# Patient Record
Sex: FEMALE | Race: WHITE | NOT HISPANIC OR LATINO | Employment: OTHER | URBAN - METROPOLITAN AREA
[De-identification: names, ages, dates, MRNs, and addresses within clinical notes are randomized per-mention and may not be internally consistent; named-entity substitution may affect disease eponyms.]

---

## 2017-05-21 PROBLEM — F98.8 ADD (ATTENTION DEFICIT DISORDER): Status: ACTIVE | Noted: 2017-05-21

## 2017-05-21 PROBLEM — F32.A ANXIETY AND DEPRESSION: Status: ACTIVE | Noted: 2017-05-21

## 2017-05-21 PROBLEM — F41.9 ANXIETY AND DEPRESSION: Status: ACTIVE | Noted: 2017-05-21

## 2017-05-21 PROBLEM — J45.30 MILD PERSISTENT ASTHMA WITHOUT COMPLICATION: Status: ACTIVE | Noted: 2017-05-21

## 2017-05-22 PROBLEM — N20.0 NEPHROLITHIASIS: Status: ACTIVE | Noted: 2017-05-22

## 2017-05-22 PROBLEM — G89.21 CHRONIC PAIN DUE TO TRAUMA: Status: ACTIVE | Noted: 2017-05-22

## 2017-05-22 PROBLEM — Z87.442 HISTORY OF NEPHROLITHIASIS: Status: ACTIVE | Noted: 2017-05-22

## 2017-05-22 PROBLEM — N31.9 NEUROGENIC BLADDER: Status: ACTIVE | Noted: 2017-05-22

## 2017-06-19 ENCOUNTER — PATIENT OUTREACH (OUTPATIENT)
Dept: ADMINISTRATIVE | Facility: HOSPITAL | Age: 57
End: 2017-06-19

## 2017-06-19 NOTE — LETTER
June 19, 2017    Vesta ROSALINDA Garrett  [address]  [place] [address]             [facility]  [address]  [facility] [address]  Phone: [phone] Dear Mrs. Tucker:    [facility] is committed to your overall health.  To help you get the most out of each of your visits, we will review your information to make sure you are up to date on all of your recommended tests and/or procedures.      As a new patient to Dr. Shaina Rasmussen, we may not have your complete medical records.  She has found that you may be due for your fasting cholesterol labs, a pap smear, hepatitis C screening, and possibly tetanus and pneumonia immunizations.     Medicare does not cover all immunizations to be given in the clinic.  Check your benefits to ensure that you do not need to receive your immunizations at the pharmacy.    If you have had any of the above done at another facility, please bring the records or information with you so that your record at [facility] will be complete.  If you would like to schedule any of these, please contact me.    If you are currently taking medication, please bring it with you to your appointment for review.    If you have any questions or concerns, please don't hesitate to call.    Thank you for letting us care for you,  Taryn Hutchinson LPN Clinical Care Coordinator  [facility] [place]  [phone]

## 2017-07-03 ENCOUNTER — OFFICE VISIT (OUTPATIENT)
Dept: FAMILY MEDICINE | Facility: CLINIC | Age: 57
End: 2017-07-03
Payer: MEDICARE

## 2017-07-03 ENCOUNTER — TELEPHONE (OUTPATIENT)
Dept: ADMINISTRATIVE | Facility: HOSPITAL | Age: 57
End: 2017-07-03

## 2017-07-03 VITALS
TEMPERATURE: 98 F | BODY MASS INDEX: 23.77 KG/M2 | WEIGHT: 147.94 LBS | HEIGHT: 66 IN | DIASTOLIC BLOOD PRESSURE: 64 MMHG | HEART RATE: 76 BPM | SYSTOLIC BLOOD PRESSURE: 102 MMHG

## 2017-07-03 DIAGNOSIS — B35.1 ONYCHOMYCOSIS: ICD-10-CM

## 2017-07-03 DIAGNOSIS — R23.8 SKIN BREAKDOWN: ICD-10-CM

## 2017-07-03 DIAGNOSIS — M21.371 FOOT DROP, BILATERAL: ICD-10-CM

## 2017-07-03 DIAGNOSIS — K51.919 ULCERATIVE COLITIS WITH COMPLICATION, UNSPECIFIED LOCATION: ICD-10-CM

## 2017-07-03 DIAGNOSIS — M21.372 FOOT DROP, BILATERAL: ICD-10-CM

## 2017-07-03 DIAGNOSIS — Z76.89 ESTABLISHING CARE WITH NEW DOCTOR, ENCOUNTER FOR: ICD-10-CM

## 2017-07-03 DIAGNOSIS — F98.8 ADD (ATTENTION DEFICIT DISORDER): ICD-10-CM

## 2017-07-03 DIAGNOSIS — Z11.59 ENCOUNTER FOR HEPATITIS C SCREENING TEST FOR LOW RISK PATIENT: Primary | ICD-10-CM

## 2017-07-03 PROCEDURE — 99999 PR PBB SHADOW E&M-EST. PATIENT-LVL V: CPT | Mod: PBBFAC,,, | Performed by: FAMILY MEDICINE

## 2017-07-03 PROCEDURE — 99204 OFFICE O/P NEW MOD 45 MIN: CPT | Mod: S$GLB,,, | Performed by: FAMILY MEDICINE

## 2017-07-03 RX ORDER — MELATONIN 5 MG
1 CAPSULE ORAL NIGHTLY PRN
COMMUNITY

## 2017-07-03 RX ORDER — ACETAMINOPHEN 325 MG/1
325 TABLET ORAL EVERY 6 HOURS PRN
COMMUNITY
End: 2022-09-27

## 2017-07-03 RX ORDER — DEXTROAMPHETAMINE SACCHARATE, AMPHETAMINE ASPARTATE MONOHYDRATE, DEXTROAMPHETAMINE SULFATE AND AMPHETAMINE SULFATE 5; 5; 5; 5 MG/1; MG/1; MG/1; MG/1
20 CAPSULE, EXTENDED RELEASE ORAL EVERY MORNING
Qty: 30 CAPSULE | Refills: 0 | Status: SHIPPED | OUTPATIENT
Start: 2017-07-03 | End: 2018-02-08 | Stop reason: SDUPTHER

## 2017-07-03 RX ORDER — CICLOPIROX 80 MG/ML
SOLUTION TOPICAL NIGHTLY
Qty: 6.6 ML | Refills: 11 | Status: SHIPPED | OUTPATIENT
Start: 2017-07-03 | End: 2023-06-21

## 2017-07-03 RX ORDER — DEXTROAMPHETAMINE SACCHARATE, AMPHETAMINE ASPARTATE MONOHYDRATE, DEXTROAMPHETAMINE SULFATE AND AMPHETAMINE SULFATE 5; 5; 5; 5 MG/1; MG/1; MG/1; MG/1
20 CAPSULE, EXTENDED RELEASE ORAL EVERY MORNING
Qty: 30 CAPSULE | Refills: 0 | Status: SHIPPED | OUTPATIENT
Start: 2017-08-03 | End: 2017-12-20 | Stop reason: SDUPTHER

## 2017-07-03 RX ORDER — MUPIROCIN 20 MG/G
OINTMENT TOPICAL 2 TIMES DAILY
Qty: 30 G | Refills: 1 | Status: SHIPPED | OUTPATIENT
Start: 2017-07-03 | End: 2017-12-20 | Stop reason: ALTCHOICE

## 2017-07-03 RX ORDER — DEXTROAMPHETAMINE SACCHARATE, AMPHETAMINE ASPARTATE MONOHYDRATE, DEXTROAMPHETAMINE SULFATE AND AMPHETAMINE SULFATE 5; 5; 5; 5 MG/1; MG/1; MG/1; MG/1
20 CAPSULE, EXTENDED RELEASE ORAL EVERY MORNING
Qty: 30 CAPSULE | Refills: 0 | Status: SHIPPED | OUTPATIENT
Start: 2017-09-03 | End: 2018-02-08 | Stop reason: SDUPTHER

## 2017-07-03 NOTE — LETTER
July 6, 2017    Dr. Yasir Hester             [facility]  [address]  [facility] [address]  Phone: [phone] July 6, 2017     Patient: Vesta Tucker    YOB: 1960   Date of Visit: 7/3/2017       To Whom It May Concern:    We are seeing Vesta Tucker in the clinic at [facility].  No primary care provider on file. is their PCP.  She/He has an outstanding lab/procedure at the time we reviewed their chart.  To help with our Health Maintenance records will you please supply the following:     [x]  Outside Immunizations (Tdap and Pneumo 23 - provide dates or update in LINKS)                         Please Fax to [facility] at .    Thank you for your help.  If my Care Coordinator can be of any assistance, you can call THONY Dumas at [phone].     This is the second attempt to acquire these records.    If you have any questions or concerns, please don't hesitate to call.    Sincerely,        Shaina Rasmussen MD

## 2017-07-03 NOTE — LETTER
July 11, 2017    Vesta Tucker  [address] [address]             [facility]  [address]  [facility] [address]  Phone: [phone] Dear Mrs. Tucker:    Dr. Rasmussen had me request your immunization records from Dr. Hester, but I have yet to receive anything from them.  Sometimes these facilities require a signed release before they will share records.  Next time you are in the office, we will have you sign a release of information and try again.    If you have any questions or concerns, please don't hesitate to call.    Thank you for letting us care for you,  Taryn Hutchinson LPN Clinical Care Coordinator  [facility] [place]  [phone]

## 2017-07-03 NOTE — TELEPHONE ENCOUNTER
----- Message from Shaina Rasmussen MD sent at 7/3/2017  9:47 AM CDT -----  Tdap and [id number] with Dk (while he was with [facility] or prior).   attempted to acquire dates.  Nothing in LINKS    Yasir Hester MD PCP - General Internal Medicine 12/28/2011 05/16/2017 5/17/17   Phone: [phone]; Fax: [phone]      Address: [address] [place] [address] [facility]  [facility] [address]

## 2017-07-03 NOTE — LETTER
July 3, 2017    Dr. Yasir Hester             [facility]  [address]  [facility] [address]  Phone: [phone] July 3, 2017     Patient: Vesta Tucker    YOB: 1960   Date of Visit: 7/3/2017       To Whom It May Concern:    We are seeing Vesta Tucker in the clinic at [facility].  No primary care provider on file. is their PCP.  She/He has an outstanding lab/procedure at the time we reviewed their chart.  To help with our Health Maintenance records will you please supply the following:     [x]  Outside Immunizations (Tdap and Pneumo 23 - provide dates or update in LINKS)                         Please Fax to [facility] at .    Thank you for your help.  If my Care Coordinator can be of any assistance, you can call THONY Dumas at [phone].     If you have any questions or concerns, please don't hesitate to call.    Sincerely,        Shaina Rasmussen MD
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
## 2017-09-18 ENCOUNTER — PATIENT OUTREACH (OUTPATIENT)
Dept: ADMINISTRATIVE | Facility: HOSPITAL | Age: 57
End: 2017-09-18

## 2017-09-18 NOTE — LETTER
September 18, 2017    Vesta TELLEZ Garrett  [address]  [place] [address]             [facility]  [address]  [facility] [address]  Phone: [phone] Dear Mrs. Tucker:    [facility] committed to your overall health.  To help you get the most out of each of your visits, we will review your information to make sure you are up to date on all of your recommended tests and/or procedures.      [facility] has found that you may be due for your fasting cholesterol labs, hepatitis C screening, and possibly some immunizations (tetanus, pneumonia, and flu).     Medicare does not cover all immunizations to be given in the clinic.  Check your benefits to ensure that you do not need to receive your immunizations at the pharmacy.    If you have had any of the above done at another facility, please bring the records or information with you so that your record at [facility] will be complete.  If you would like to schedule any of these, please contact me.    If you are currently taking medication, please bring it with you to your appointment for review.    If you have any questions or concerns, please don't hesitate to call.    Thank you for letting us care for you,  Taryn Hutchinson LPN Clinical Care Coordinator  [facility] [place]  [phone]

## 2017-12-20 ENCOUNTER — OFFICE VISIT (OUTPATIENT)
Dept: FAMILY MEDICINE | Facility: CLINIC | Age: 57
End: 2017-12-20
Payer: MEDICARE

## 2017-12-20 VITALS
BODY MASS INDEX: 24.36 KG/M2 | HEIGHT: 66 IN | DIASTOLIC BLOOD PRESSURE: 60 MMHG | WEIGHT: 151.56 LBS | SYSTOLIC BLOOD PRESSURE: 100 MMHG | TEMPERATURE: 99 F | HEART RATE: 98 BPM | OXYGEN SATURATION: 95 %

## 2017-12-20 DIAGNOSIS — J20.8 ACUTE BRONCHITIS, VIRAL: ICD-10-CM

## 2017-12-20 DIAGNOSIS — J45.31 MILD PERSISTENT ASTHMA WITH ACUTE EXACERBATION: ICD-10-CM

## 2017-12-20 DIAGNOSIS — J32.3 SPHENOID SINUSITIS, UNSPECIFIED CHRONICITY: ICD-10-CM

## 2017-12-20 DIAGNOSIS — J11.1 INFLUENZAL ACUTE UPPER RESPIRATORY INFECTION: Primary | ICD-10-CM

## 2017-12-20 DIAGNOSIS — N31.9 NEUROGENIC BLADDER: ICD-10-CM

## 2017-12-20 DIAGNOSIS — R68.89 INFLUENZA-LIKE SYMPTOMS: ICD-10-CM

## 2017-12-20 LAB
CTP QC/QA: YES
FLUAV AG NPH QL: NEGATIVE
FLUBV AG NPH QL: NEGATIVE

## 2017-12-20 PROCEDURE — 99999 PR PBB SHADOW E&M-EST. PATIENT-LVL IV: CPT | Mod: PBBFAC,,, | Performed by: INTERNAL MEDICINE

## 2017-12-20 PROCEDURE — 99214 OFFICE O/P EST MOD 30 MIN: CPT | Mod: 25,S$GLB,, | Performed by: INTERNAL MEDICINE

## 2017-12-20 PROCEDURE — 87804 INFLUENZA ASSAY W/OPTIC: CPT | Mod: QW,S$GLB,, | Performed by: INTERNAL MEDICINE

## 2017-12-20 RX ORDER — BUDESONIDE AND FORMOTEROL FUMARATE DIHYDRATE 80; 4.5 UG/1; UG/1
2 AEROSOL RESPIRATORY (INHALATION) 2 TIMES DAILY
Qty: 1 INHALER | Refills: 1 | Status: SHIPPED | OUTPATIENT
Start: 2017-12-20 | End: 2019-01-28 | Stop reason: SDUPTHER

## 2017-12-20 RX ORDER — DOXYCYCLINE HYCLATE 100 MG
100 TABLET ORAL EVERY 12 HOURS
Qty: 20 TABLET | Refills: 0 | Status: SHIPPED | OUTPATIENT
Start: 2017-12-20 | End: 2017-12-30

## 2017-12-20 RX ORDER — OSELTAMIVIR PHOSPHATE 75 MG/1
75 CAPSULE ORAL 2 TIMES DAILY
Qty: 10 CAPSULE | Refills: 0 | Status: SHIPPED | OUTPATIENT
Start: 2017-12-20 | End: 2017-12-25

## 2017-12-20 RX ORDER — METHYLPREDNISOLONE 4 MG/1
TABLET ORAL
Qty: 1 PACKAGE | Refills: 0 | Status: SHIPPED | OUTPATIENT
Start: 2017-12-20 | End: 2018-01-10

## 2017-12-20 RX ORDER — BENZONATATE 100 MG/1
100 CAPSULE ORAL 3 TIMES DAILY PRN
Qty: 30 CAPSULE | Refills: 0 | Status: SHIPPED | OUTPATIENT
Start: 2017-12-20 | End: 2017-12-30

## 2017-12-20 NOTE — PATIENT INSTRUCTIONS
Influenzal acute upper respiratory infection; tamiflu 75 mg 2x a day for 5 days. Mucinex DM otc for cough and congestion. Tussionex for coughing spasms.  -     POCT Influenza A/B    Influenza-like symptoms; rest and fluids; aleve for body aches and pains.  -     POCT Influenza A/B    Mild persistent asthma with acute exacerbation; use her albuterol Nebs/MDI q 4 hrs prn wheeze/ SOB. 6 day medrol dose pack.    Acute bronchitis, viral; mucinex DM as needed.tessalon perles as needed.    Sphenoid sinusitis, unspecified chronicity; use zyrtec 10 mg a day for congestion; flonase 1 spray 1-2x a day as needed.    Neurogenic bladder; self catherization as needed.    Other orders  -     methylPREDNISolone (MEDROL DOSEPACK) 4 mg tablet; use as directed; 6 day pack.  Dispense: 1 Package; Refill: 0  -     oseltamivir (TAMIFLU) 75 MG capsule; Take 1 capsule (75 mg total) by mouth 2 (two) times daily. Take pc.  Dispense: 10 capsule; Refill: 0

## 2017-12-20 NOTE — PROGRESS NOTES
Subjective:       Patient ID: Vesta Tucker is a 57 y.o. female.    Chief Complaint: Cough (x 1 day with clear mucus ); Generalized Body Aches (pt says that all three kids have the flu); Fever; Headache; and Sore Throat    HPI  Pt seen today for Dr Elder; Dry cough for 1 day w clear mucus; myalgias diffuse w HA's acute onset since this morning; 4 family members on tamiflu; 5th on augmentin for sinusitis.   Pt w temp 99.5 today; took 500 mg tylenol prior. Also w some nausea. A lot of fatigue. Sore throat also. Sinus congestion w nasal drip also. Taking robitussin CF, sudafed and tylenol. Hx of asthma; no audible wheezing. Using albuterol as MDI/neb today 3x; due to coughing spasms. PEF was 340 cc. Nasal swab for influ A/B was neg. Plan of care discussed at length.  Review of Systems   Constitutional: Positive for fatigue. Negative for appetite change, chills, fever and unexpected weight change.   HENT: Negative for congestion, postnasal drip, rhinorrhea and sinus pressure.         See HEENT    Eyes: Negative for discharge and itching.   Respiratory: Positive for cough and shortness of breath. Negative for chest tightness and wheezing.         As VEELZ.   Cardiovascular: Negative for chest pain, palpitations and leg swelling.   Gastrointestinal: Negative for abdominal distention, abdominal pain, blood in stool, constipation, diarrhea, nausea and vomiting.   Endocrine: Negative for polydipsia, polyphagia and polyuria.   Genitourinary: Negative for dysuria and hematuria.        Self catherization due to spinal cord injury.    Musculoskeletal: Negative for arthralgias and myalgias.   Skin: Negative for rash.   Allergic/Immunologic: Negative for environmental allergies and food allergies.   Neurological: Negative for tremors, seizures and syncope.   Hematological: Negative for adenopathy. Does not bruise/bleed easily.   Psychiatric/Behavioral:        No anxiety or depression.       Objective:      Vitals:    12/20/17 1520  "  BP: 100/60   BP Location: Left arm   Patient Position: Sitting   BP Method: Medium (Manual)   Pulse: 98   Temp: 98.8 °F (37.1 °C)   TempSrc: Oral   SpO2: 95%   Weight: 68.8 kg (151 lb 9.1 oz)   Height: 5' 6" (1.676 m)     Body mass index is 24.46 kg/m².    Physical Exam   Constitutional: She is oriented to person, place, and time. She appears well-developed and well-nourished.   HENT:   Head: Normocephalic and atraumatic.   TM's pink and throat; post pharynx mildly inflamed. NM swollen; inflamed w clear to yel-gold mucus. Sphenoidal sinus tenderness.   Eyes: EOM are normal.   Neck: Normal range of motion. Neck supple. No thyromegaly present.   Cardiovascular: Normal rate, regular rhythm and normal heart sounds.  Exam reveals no gallop.    No murmur heard.  Pulmonary/Chest: Effort normal. No respiratory distress. She has no wheezes. She has no rales.   Decreased mae BS's w scattered wheeze. Unable to forcibly  without coughing.    Abdominal: Soft. Bowel sounds are normal. She exhibits no distension. There is no tenderness. There is no rebound and no guarding.   Musculoskeletal: Normal range of motion. She exhibits no edema.   Lymphadenopathy:     She has no cervical adenopathy.   Neurological: She is alert and oriented to person, place, and time.   Moves all 4 extremities fine.   Skin: No rash noted.   Psychiatric: She has a normal mood and affect. Her behavior is normal. Thought content normal.   Vitals reviewed.      Assessment:       1. Influenzal acute upper respiratory infection    2. Influenza-like symptoms    3. Mild persistent asthma with acute exacerbation    4. Acute bronchitis, viral    5. Sphenoid sinusitis, unspecified chronicity    6. Neurogenic bladder        Plan:       Influenzal acute upper respiratory infection; tamiflu 75 mg 2x a day for 5 days. Mucinex DM otc for cough and congestion. Tussionex for coughing spasms.  -     POCT Influenza A/B    Influenza-like symptoms; rest and fluids; " aleve for body aches and pains.  -     POCT Influenza A/B    Mild persistent asthma with acute exacerbation; use her albuterol Nebs/MDI q 4 hrs prn wheeze/ SOB. 6 day medrol dose pack. Follow PEF; given flow meter.     Acute bronchitis, viral; mucinex DM as needed.tessalon perles as needed.    Sphenoid sinusitis, unspecified chronicity; use zyrtec 10 mg a day for congestion; flonase 1 spray 1-2x a day as needed.    Neurogenic bladder; self catherization as needed.    Other orders  -     methylPREDNISolone (MEDROL DOSEPACK) 4 mg tablet; use as directed; 6 day pack.  Dispense: 1 Package; Refill: 0  -     oseltamivir (TAMIFLU) 75 MG capsule; Take 1 capsule (75 mg total) by mouth 2 (two) times daily. Take pc.  Dispense: 10 capsule; Refill: 0

## 2017-12-21 ENCOUNTER — HOSPITAL ENCOUNTER (OUTPATIENT)
Dept: RADIOLOGY | Facility: HOSPITAL | Age: 57
Discharge: HOME | End: 2017-12-21
Attending: INTERNAL MEDICINE
Payer: MEDICARE

## 2017-12-21 DIAGNOSIS — J20.8 ACUTE BRONCHITIS, VIRAL: ICD-10-CM

## 2017-12-21 DIAGNOSIS — J45.31 MILD PERSISTENT ASTHMA WITH ACUTE EXACERBATION: ICD-10-CM

## 2017-12-21 DIAGNOSIS — J11.1 INFLUENZAL ACUTE UPPER RESPIRATORY INFECTION: ICD-10-CM

## 2017-12-21 PROCEDURE — 71020 XR CHEST PA AND LATERAL: CPT | Mod: TC,PN

## 2017-12-21 PROCEDURE — 71020 XR CHEST PA AND LATERAL: CPT | Mod: 26,,, | Performed by: RADIOLOGY

## 2018-01-08 RX ORDER — DEXTROAMPHETAMINE SACCHARATE, AMPHETAMINE ASPARTATE MONOHYDRATE, DEXTROAMPHETAMINE SULFATE AND AMPHETAMINE SULFATE 5; 5; 5; 5 MG/1; MG/1; MG/1; MG/1
CAPSULE, EXTENDED RELEASE ORAL
Qty: 30 CAPSULE | Refills: 0 | Status: SHIPPED | OUTPATIENT
Start: 2018-01-08 | End: 2018-02-08 | Stop reason: SDUPTHER

## 2018-02-08 ENCOUNTER — OFFICE VISIT (OUTPATIENT)
Dept: FAMILY MEDICINE | Facility: CLINIC | Age: 58
End: 2018-02-08
Payer: MEDICARE

## 2018-02-08 VITALS
OXYGEN SATURATION: 98 % | BODY MASS INDEX: 24.36 KG/M2 | DIASTOLIC BLOOD PRESSURE: 62 MMHG | SYSTOLIC BLOOD PRESSURE: 108 MMHG | HEIGHT: 66 IN | WEIGHT: 151.56 LBS | HEART RATE: 84 BPM

## 2018-02-08 DIAGNOSIS — N20.0 NEPHROLITHIASIS: ICD-10-CM

## 2018-02-08 DIAGNOSIS — M21.372 FOOT DROP, BILATERAL: ICD-10-CM

## 2018-02-08 DIAGNOSIS — M21.371 FOOT DROP, BILATERAL: ICD-10-CM

## 2018-02-08 DIAGNOSIS — R07.9 CHEST PAIN, UNSPECIFIED TYPE: Primary | ICD-10-CM

## 2018-02-08 DIAGNOSIS — G89.21 CHRONIC PAIN DUE TO TRAUMA: ICD-10-CM

## 2018-02-08 PROCEDURE — 93005 ELECTROCARDIOGRAM TRACING: CPT | Mod: S$GLB,,, | Performed by: FAMILY MEDICINE

## 2018-02-08 PROCEDURE — 99213 OFFICE O/P EST LOW 20 MIN: CPT | Mod: PN | Performed by: FAMILY MEDICINE

## 2018-02-08 PROCEDURE — 93010 ELECTROCARDIOGRAM REPORT: CPT | Mod: S$GLB,,, | Performed by: INTERNAL MEDICINE

## 2018-02-08 PROCEDURE — 99214 OFFICE O/P EST MOD 30 MIN: CPT | Mod: S$GLB,,, | Performed by: FAMILY MEDICINE

## 2018-02-08 PROCEDURE — 99999 PR PBB SHADOW E&M-EST. PATIENT-LVL III: CPT | Mod: PBBFAC,,, | Performed by: FAMILY MEDICINE

## 2018-02-08 PROCEDURE — 3008F BODY MASS INDEX DOCD: CPT | Mod: S$GLB,,, | Performed by: FAMILY MEDICINE

## 2018-02-08 RX ORDER — DEXTROAMPHETAMINE SACCHARATE, AMPHETAMINE ASPARTATE MONOHYDRATE, DEXTROAMPHETAMINE SULFATE AND AMPHETAMINE SULFATE 5; 5; 5; 5 MG/1; MG/1; MG/1; MG/1
20 CAPSULE, EXTENDED RELEASE ORAL EVERY MORNING
Qty: 30 CAPSULE | Refills: 0 | Status: SHIPPED | OUTPATIENT
Start: 2018-02-08 | End: 2018-05-17 | Stop reason: SDUPTHER

## 2018-02-08 RX ORDER — CYCLOBENZAPRINE HCL 10 MG
10 TABLET ORAL 3 TIMES DAILY PRN
Qty: 90 TABLET | Refills: 0 | Status: SHIPPED | OUTPATIENT
Start: 2018-02-08 | End: 2018-05-04 | Stop reason: SDUPTHER

## 2018-02-08 RX ORDER — HYDROCHLOROTHIAZIDE 12.5 MG/1
12.5 TABLET ORAL DAILY
Qty: 90 TABLET | Refills: 1
Start: 2018-02-08 | End: 2018-02-09 | Stop reason: SDUPTHER

## 2018-02-08 NOTE — PROGRESS NOTES
Subjective:       Patient ID: Vesta Tucker is a 57 y.o. female.    Chief Complaint: Follow-up and Chest Pain (Lside CP since yest, says asthma related)    HPI   PMH sig for UC, neurogenic bladder, chronic pain due to trauma, asthma, anxiety/depression/add, recurrent kidney stones.    Patient in the office with L chest wall pain, onset this morning. Upper and lower. She recalls that it has occurred prev in Dec. Does not limit physical activity/exercise incl an hour at the gym in full cardio, .   Currently using albuterol bid. Not using symbicort. Sx improved temporarily while on neb, but then recurred.  Pain is not severe, just nagging. Not reproducible on palp.     Review of Systems   Constitutional: Negative for appetite change, chills, fatigue, fever and unexpected weight change.   HENT: Negative for congestion, postnasal drip, rhinorrhea, sinus pain, sinus pressure, sneezing and sore throat.         Prev flu sx resolved.   Eyes: Negative for discharge and itching.   Respiratory: Negative for cough, chest tightness, shortness of breath and wheezing.    Cardiovascular: Positive for chest pain. Negative for palpitations and leg swelling.   Gastrointestinal: Negative for abdominal pain, constipation and diarrhea.   Genitourinary:        Self catherization due to spinal cord injury.    Musculoskeletal: Negative for arthralgias and myalgias.   Skin: Negative for rash.   Neurological: Negative for tremors and syncope.   Hematological: Negative for adenopathy. Does not bruise/bleed easily.       Objective:      Physical Exam   Constitutional: She is oriented to person, place, and time. She appears well-developed and well-nourished. No distress.   HENT:   Head: Normocephalic and atraumatic.   Nose: Nose normal.   Mouth/Throat: Oropharynx is clear and moist. No oropharyngeal exudate.   Eyes: Conjunctivae are normal. Right eye exhibits no discharge. Left eye exhibits no discharge. No scleral icterus.   Neck: Normal  range of motion. Neck supple.   Cardiovascular: Normal rate and regular rhythm.    Pulmonary/Chest: Effort normal and breath sounds normal. No respiratory distress.   clear   Abdominal: Soft. She exhibits no distension.   Musculoskeletal: Normal range of motion. She exhibits no edema.   Lymphadenopathy:     She has no cervical adenopathy.   Neurological: She is alert and oriented to person, place, and time.   Skin: Skin is warm and dry. No rash noted.   Psychiatric: She has a normal mood and affect. Her behavior is normal.   Nursing note and vitals reviewed.      EKG rev, nsr with arrhythmia    Chest pain, unspecified type  -     IN OFFICE EKG 12-LEAD (to Muse)  No worrisome sx noted.  ekg wnl.   Likely mild underlying asthma exacerbation. Reviewed scheduled nebs, add symbicort daily for at least a month.  Foot drop, bilateral  -     cyclobenzaprine (FLEXERIL) 10 MG tablet; Take 1 tablet (10 mg total) by mouth 3 (three) times daily as needed for Muscle spasms.  Dispense: 90 tablet; Refill: 0  Chronic pain due to trauma  -     cyclobenzaprine (FLEXERIL) 10 MG tablet; Take 1 tablet (10 mg total) by mouth 3 (three) times daily as needed for Muscle spasms.  Dispense: 90 tablet; Refill: 0  Nephrolithiasis  -     hydroCHLOROthiazide (HYDRODIURIL) 12.5 MG Tab; Take 1 tablet (12.5 mg total) by mouth once daily. Every day  Dispense: 90 tablet; Refill: 1  Cont current dose.  Other orders  -     dextroamphetamine-amphetamine (ADDERALL XR) 20 MG 24 hr capsule; Take 1 capsule (20 mg total) by mouth every morning.  Dispense: 30 capsule; Refill: 0

## 2018-02-09 DIAGNOSIS — N20.0 NEPHROLITHIASIS: ICD-10-CM

## 2018-02-09 NOTE — TELEPHONE ENCOUNTER
----- Message from Lilian Carr sent at 2/9/2018  4:38 PM CST -----  Vesta, patient [phone], calling about the Rx hydroCHLOROthiazide (HYDRODIURIL) 12.5 MG Tab. Rx did not get refilled after the office visit yesterday. Patient is out of this medication. Please advise. Thanks.   [facility] -    [address]  [facility] [address]  Phone: [phone] Fax: [phone]

## 2018-02-12 RX ORDER — HYDROCHLOROTHIAZIDE 12.5 MG/1
12.5 TABLET ORAL DAILY
Qty: 90 TABLET | Refills: 1 | Status: ON HOLD
Start: 2018-02-12 | End: 2018-10-11 | Stop reason: HOSPADM

## 2018-02-16 ENCOUNTER — TELEPHONE (OUTPATIENT)
Dept: FAMILY MEDICINE | Facility: CLINIC | Age: 58
End: 2018-02-16

## 2018-02-16 NOTE — TELEPHONE ENCOUNTER
Gave verbal for hydrochlorothiazide 12.5 daily to pharmacy per Dr Gibbs. Script did not go electronically.

## 2018-05-04 DIAGNOSIS — G89.21 CHRONIC PAIN DUE TO TRAUMA: ICD-10-CM

## 2018-05-04 DIAGNOSIS — M21.372 FOOT DROP, BILATERAL: ICD-10-CM

## 2018-05-04 DIAGNOSIS — M21.371 FOOT DROP, BILATERAL: ICD-10-CM

## 2018-05-07 RX ORDER — CYCLOBENZAPRINE HCL 10 MG
10 TABLET ORAL 3 TIMES DAILY PRN
Qty: 90 TABLET | Refills: 1 | Status: SHIPPED | OUTPATIENT
Start: 2018-05-07 | End: 2018-08-21 | Stop reason: SDUPTHER

## 2018-05-17 RX ORDER — DEXTROAMPHETAMINE SACCHARATE, AMPHETAMINE ASPARTATE MONOHYDRATE, DEXTROAMPHETAMINE SULFATE AND AMPHETAMINE SULFATE 5; 5; 5; 5 MG/1; MG/1; MG/1; MG/1
CAPSULE, EXTENDED RELEASE ORAL
Qty: 30 CAPSULE | Refills: 0 | Status: SHIPPED | OUTPATIENT
Start: 2018-05-17 | End: 2018-07-10 | Stop reason: SDUPTHER

## 2018-05-18 NOTE — TELEPHONE ENCOUNTER
Last seen on 2-8-2018    Pt will need a follow up appt.     Tired to contact pt. Not able to leave voice msg to call back.     Contacting to sched f/u appt.

## 2018-05-18 NOTE — TELEPHONE ENCOUNTER
Tried to reach pt. No answer, left msg to call back.    [facility]

## 2018-07-10 NOTE — LETTER
July 27, 2018    Vesta TELLEZ Garrett  [address]  [place] [address]             [facility]  [address] [facility]  [place] [address]  Phone: [phone]  Fax: [phone] Dear Mrs. Tucker:    Please contact the office at your earliest convenience to schedule an appointment to see your primary care provider and blood work.      If you have any questions or concerns, please don't hesitate to call.    Sincerely,        Radha López LPN

## 2018-07-11 RX ORDER — DEXTROAMPHETAMINE SACCHARATE, AMPHETAMINE ASPARTATE MONOHYDRATE, DEXTROAMPHETAMINE SULFATE AND AMPHETAMINE SULFATE 5; 5; 5; 5 MG/1; MG/1; MG/1; MG/1
CAPSULE, EXTENDED RELEASE ORAL
Qty: 30 CAPSULE | Refills: 0 | Status: SHIPPED | OUTPATIENT
Start: 2018-07-11 | End: 2018-08-21 | Stop reason: SDUPTHER

## 2018-08-07 ENCOUNTER — TELEPHONE (OUTPATIENT)
Dept: FAMILY MEDICINE | Facility: CLINIC | Age: 58
End: 2018-08-07

## 2018-08-07 DIAGNOSIS — L24.7 IRRITANT CONTACT DERMATITIS DUE TO PLANTS, EXCEPT FOOD: Primary | ICD-10-CM

## 2018-08-07 NOTE — TELEPHONE ENCOUNTER
----- Message from Agueda Mobley sent at 8/7/2018  9:18 AM CDT -----  Pt said she has poison ivy and she said can she get something called into [facility]

## 2018-08-08 RX ORDER — TRIAMCINOLONE ACETONIDE 5 MG/G
CREAM TOPICAL 2 TIMES DAILY
Qty: 15 G | Refills: 1 | Status: SHIPPED | OUTPATIENT
Start: 2018-08-08 | End: 2018-10-08

## 2018-08-08 NOTE — TELEPHONE ENCOUNTER
Patient not available at home per Gabe Prader; left message asking patient contact pharmacy for request and call for appt if no better.

## 2018-08-08 NOTE — TELEPHONE ENCOUNTER
Topical kenalog sent to pharmacy. If more extensive, or not responding, needs to be seen in clinic.

## 2018-08-21 DIAGNOSIS — M21.372 FOOT DROP, BILATERAL: ICD-10-CM

## 2018-08-21 DIAGNOSIS — G89.21 CHRONIC PAIN DUE TO TRAUMA: ICD-10-CM

## 2018-08-21 DIAGNOSIS — Z11.59 ENCOUNTER FOR HEPATITIS C SCREENING TEST FOR LOW RISK PATIENT: ICD-10-CM

## 2018-08-21 DIAGNOSIS — Z79.899 HIGH RISK MEDICATIONS (NOT ANTICOAGULANTS) LONG-TERM USE: ICD-10-CM

## 2018-08-21 DIAGNOSIS — M21.371 FOOT DROP, BILATERAL: ICD-10-CM

## 2018-08-21 DIAGNOSIS — J45.30 MILD PERSISTENT ASTHMA WITHOUT COMPLICATION: Primary | ICD-10-CM

## 2018-08-21 NOTE — LETTER
September 4, 2018    Vesta TELLEZ Garrett  [address]  [place] [address]             [facility]  [address] [facility] [address]  [place] [address]  Phone: [phone]  Fax: [phone] Dear Mrs. Tucker:    Please contact the office at your earliest convenience to schedule an appointment to see your primary care provider and blood work.      If you have any questions or concerns, please don't hesitate to call.    Sincerely,        Radha López LPN

## 2018-08-22 RX ORDER — CYCLOBENZAPRINE HCL 10 MG
TABLET ORAL
Qty: 90 TABLET | Refills: 1 | Status: SHIPPED | OUTPATIENT
Start: 2018-08-22 | End: 2018-10-25 | Stop reason: SDUPTHER

## 2018-08-22 RX ORDER — HYDROCHLOROTHIAZIDE 12.5 MG/1
CAPSULE ORAL
Qty: 90 CAPSULE | Refills: 0 | Status: SHIPPED | OUTPATIENT
Start: 2018-08-22 | End: 2018-10-08 | Stop reason: SDUPTHER

## 2018-08-22 RX ORDER — DEXTROAMPHETAMINE SACCHARATE, AMPHETAMINE ASPARTATE MONOHYDRATE, DEXTROAMPHETAMINE SULFATE AND AMPHETAMINE SULFATE 5; 5; 5; 5 MG/1; MG/1; MG/1; MG/1
CAPSULE, EXTENDED RELEASE ORAL
Qty: 30 CAPSULE | Refills: 0 | Status: SHIPPED | OUTPATIENT
Start: 2018-08-22 | End: 2019-01-28 | Stop reason: SDUPTHER

## 2018-10-07 ENCOUNTER — OFFICE VISIT (OUTPATIENT)
Dept: URGENT CARE | Facility: CLINIC | Age: 58
End: 2018-10-07
Payer: MEDICARE

## 2018-10-07 VITALS
RESPIRATION RATE: 16 BRPM | TEMPERATURE: 98 F | HEART RATE: 108 BPM | OXYGEN SATURATION: 97 % | HEIGHT: 66 IN | SYSTOLIC BLOOD PRESSURE: 111 MMHG | WEIGHT: 151 LBS | BODY MASS INDEX: 24.27 KG/M2 | DIASTOLIC BLOOD PRESSURE: 67 MMHG

## 2018-10-07 DIAGNOSIS — R19.7 DIARRHEA, UNSPECIFIED TYPE: Primary | ICD-10-CM

## 2018-10-07 PROCEDURE — 99214 OFFICE O/P EST MOD 30 MIN: CPT | Mod: S$GLB,,, | Performed by: FAMILY MEDICINE

## 2018-10-07 RX ORDER — PROMETHAZINE HYDROCHLORIDE 25 MG/1
25 TABLET ORAL NIGHTLY
Qty: 30 TABLET | Refills: 1 | Status: SHIPPED | OUTPATIENT
Start: 2018-10-07 | End: 2020-08-28 | Stop reason: SDUPTHER

## 2018-10-07 RX ORDER — DICYCLOMINE HYDROCHLORIDE 10 MG/1
10 CAPSULE ORAL 3 TIMES DAILY PRN
Qty: 90 CAPSULE | Refills: 0 | Status: SHIPPED | OUTPATIENT
Start: 2018-10-07 | End: 2018-11-06

## 2018-10-07 NOTE — PROGRESS NOTES
"Subjective:       Patient ID: Vesta Tucker is a 58 y.o. female.    Vitals:  height is 5' 6" (1.676 m) and weight is 68.5 kg (151 lb). Her temperature is 98.4 °F (36.9 °C). Her blood pressure is 111/67 and her pulse is 108. Her respiration is 16 and oxygen saturation is 97%.     Chief Complaint: Diarrhea    Pt has had diarrhea x 4 days. Pt also c/o stomach cramping. No other sick contacts. No food exposure. No travel. Feels different than her UC which is in her transverse colon      Diarrhea    This is a new problem. The current episode started in the past 7 days. The problem has been gradually worsening. Associated symptoms include abdominal pain. Pertinent negatives include no chills, fever, headaches or vomiting.     Review of Systems   Constitution: Negative for chills and fever.   HENT: Negative for sore throat.    Eyes: Negative for blurred vision.   Cardiovascular: Negative for chest pain.   Respiratory: Negative for shortness of breath.    Skin: Negative for rash.   Musculoskeletal: Negative for back pain and joint pain.   Gastrointestinal: Positive for abdominal pain and diarrhea. Negative for nausea and vomiting.   Neurological: Negative for headaches.   Psychiatric/Behavioral: The patient is not nervous/anxious.        Objective:      Physical Exam   Constitutional: She is oriented to person, place, and time. She appears well-developed and well-nourished.   HENT:   Head: Normocephalic and atraumatic.   Nose: Nose normal.   Mouth/Throat: Mucous membranes are normal.   Eyes: Conjunctivae and lids are normal.   Neck: Trachea normal and full passive range of motion without pain. Neck supple.   Cardiovascular: Normal rate.   Pulmonary/Chest: Effort normal. No respiratory distress.   Abdominal: Soft. Normal appearance and bowel sounds are normal. She exhibits no distension, no abdominal bruit, no pulsatile midline mass and no mass. There is tenderness (lower abdomen).   Musculoskeletal: Normal range of motion. " She exhibits no edema.   Neurological: She is alert and oriented to person, place, and time. She has normal strength.   Skin: Skin is warm, dry and intact. She is not diaphoretic. No pallor.   Psychiatric: She has a normal mood and affect. Her speech is normal and behavior is normal. Judgment and thought content normal. Cognition and memory are normal.   Nursing note and vitals reviewed.      Assessment:       1. Diarrhea, unspecified type        Plan:         Diarrhea, unspecified type    Other orders  -     dicyclomine (BENTYL) 10 MG capsule; Take 1 capsule (10 mg total) by mouth 3 (three) times daily as needed.  Dispense: 90 capsule; Refill: 0  -     promethazine (PHENERGAN) 25 MG tablet; Take 1 tablet (25 mg total) by mouth every evening.  Dispense: 30 tablet; Refill: 1

## 2018-10-07 NOTE — PATIENT INSTRUCTIONS
DIARRHEA    Diarrhea has several possible causes. Common 'stomach flu' is caused by a virus. Food poisoning, bacteria or parasites are other causes for diarrhea. Only diarrhea caused by bacteria or parasites requires treatment with an antibiotic. Diarrhea from a virus or food poisoning improves with simple home treatment. Antibiotic usage in recent weeks can also be a cause of your diarrhea, a condition called 'c. Diff'; so it is important to let the doctor know if you have taken any within the last few weeks    A stool sample is needed to make the diagnosis of an infection with bacteria or parasites. Up to three stool specimens may be required to diagnose This may take several days to get the result. It may be necessary to wait until the stool test is complete to make the diagnosis and select the best antibiotic to prescribe.    HOME CARE:  1. If symptoms are severe, rest at home for the next 24 hours or until you are feeling better.  2. You may use acetaminophen (Tylenol) or ibuprofen (Motrin, Advil) to control fever, unless another medicine was prescribed. [NOTE: If you have chronic liver or kidney disease or ever had a stomach ulcer or GI bleeding, talk with your doctor before using these medicines.] (Aspirin should never be used in anyone under 18 years of age who is ill with a fever. It may cause severe liver damage.)  3. Avoid tobacco, caffeine and alcohol, which may worsen your symptoms.  4. If anti-diarrhea medicine was prescribed, take this only as directed. Sometimes anti-diarrhea medicine can make your condition worse if the cause is an infectious diarrhea. Therefore, anti-diarrhea medicine should not be taken for this condition unless advised by your doctor.    DURING THE FIRST 12-24 HOURS follow the diet below:  BEVERAGES: Sport drinks like Gatorade, soft drinks without caffeine; ginger ale, mineral water (plain or flavored), decaffeinated tea and coffee.  SOUPS: Clear broth, consommé and  bouillon  DESSERTS: Plain gelatin (Jell-O), popsicles and fruit juice bars.    DURING THE NEXT 24 HOURS you may add the following to the above:  Hot cereal, plain toast, bread, rolls, crackers  Plain noodles, rice, mashed potatoes, chicken noodle or rice soup  Unsweetened canned fruit (avoid pineapple), bananas  Limit fat intake to less than 15 grams per day by avoiding margarine, butter, oils, mayonnaise, sauces, gravies, fried foods, peanut butter, meat, poultry and fish.  Limit fiber; avoid raw or cooked vegetables, fresh fruits (except bananas) and bran cereals.  Limit caffeine and chocolate. No spices or seasonings except salt.    DURING THE NEXT 24 HOURS    Gradually resume a normal diet, as you feel better and your symptoms lessen.    FOLLOW UP with your doctor or as advised if you are not improving over the next two days. If you were asked to bring a specimen from home, bring the sample on the day of collection. You may call in 2 days (or as directed) for the results.    GET PROMPT MEDICAL ATTENTION if any of the following occur:  Increasing abdominal pain or constant lower right abdominal pain  Continued vomiting (unable to keep liquids down)  Frequent diarrhea (more than 5 times a day)  Blood in vomit or stool (black or red color)  Reduced oral intake  Dark urine, reduced urine output  Weakness, dizziness, fainting  Drowsiness, confusion, stiff neck or seizure  Fever over 101.0°F (38.3°C) for more than 3 days  New rash

## 2018-10-08 ENCOUNTER — OFFICE VISIT (OUTPATIENT)
Dept: FAMILY MEDICINE | Facility: CLINIC | Age: 58
End: 2018-10-08
Payer: MEDICARE

## 2018-10-08 VITALS
TEMPERATURE: 99 F | WEIGHT: 145.94 LBS | HEIGHT: 66 IN | HEART RATE: 112 BPM | DIASTOLIC BLOOD PRESSURE: 64 MMHG | BODY MASS INDEX: 23.46 KG/M2 | SYSTOLIC BLOOD PRESSURE: 104 MMHG

## 2018-10-08 DIAGNOSIS — R10.84 GENERALIZED ABDOMINAL PAIN: ICD-10-CM

## 2018-10-08 DIAGNOSIS — K51.919 ULCERATIVE COLITIS WITH COMPLICATION, UNSPECIFIED LOCATION: ICD-10-CM

## 2018-10-08 DIAGNOSIS — R19.7 DIARRHEA, UNSPECIFIED TYPE: Primary | ICD-10-CM

## 2018-10-08 DIAGNOSIS — R34 DECREASED URINE OUTPUT: ICD-10-CM

## 2018-10-08 DIAGNOSIS — E86.0 DEHYDRATION: ICD-10-CM

## 2018-10-08 DIAGNOSIS — R11.0 NAUSEA: ICD-10-CM

## 2018-10-08 PROBLEM — D18.03 HEPATIC HEMANGIOMA: Status: ACTIVE | Noted: 2018-10-08

## 2018-10-08 PROCEDURE — 99999 PR PBB SHADOW E&M-EST. PATIENT-LVL III: CPT | Mod: PBBFAC,,, | Performed by: INTERNAL MEDICINE

## 2018-10-08 PROCEDURE — 99213 OFFICE O/P EST LOW 20 MIN: CPT | Mod: PBBFAC,PN | Performed by: INTERNAL MEDICINE

## 2018-10-08 PROCEDURE — 3008F BODY MASS INDEX DOCD: CPT | Mod: CPTII,,, | Performed by: INTERNAL MEDICINE

## 2018-10-08 PROCEDURE — 99213 OFFICE O/P EST LOW 20 MIN: CPT | Mod: S$PBB,,, | Performed by: INTERNAL MEDICINE

## 2018-10-08 PROCEDURE — 99499 UNLISTED E&M SERVICE: CPT | Mod: S$GLB,,, | Performed by: INTERNAL MEDICINE
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
## 2018-10-10 ENCOUNTER — TELEPHONE (OUTPATIENT)
Dept: URGENT CARE | Facility: CLINIC | Age: 58
End: 2018-10-10

## 2018-10-11 ENCOUNTER — TELEPHONE (OUTPATIENT)
Dept: GASTROENTEROLOGY | Facility: CLINIC | Age: 58
End: 2018-10-11

## 2018-10-11 NOTE — TELEPHONE ENCOUNTER
----- Message from Sasha Bear sent at 10/11/2018  1:37 PM CDT -----  Type:  Sooner Apoointment Request    Caller is requesting a sooner appointment.  Caller declined first available appointment listed below.  Caller will not accept being placed on the waitlist and is requesting a message be sent to doctor.    Name of Caller: self   When is the first available appointment?  11/21/2018  Symptoms: NA Best Call Back Number:  [phone]  Additional Information:  Patient needs an earlier appointment to be see for one week follow up

## 2018-10-15 ENCOUNTER — TELEPHONE (OUTPATIENT)
Dept: GASTROENTEROLOGY | Facility: CLINIC | Age: 58
End: 2018-10-15

## 2018-10-15 RX ORDER — CIPROFLOXACIN 500 MG/1
500 TABLET ORAL 2 TIMES DAILY
Qty: 14 TABLET | Refills: 0 | Status: SHIPPED | OUTPATIENT
Start: 2018-10-15 | End: 2019-01-28 | Stop reason: ALTCHOICE

## 2018-10-15 NOTE — TELEPHONE ENCOUNTER
Pt notified stool positive for salmonella, spoke to Dr. Clark. Pt instr to taper prednisone starting tomorrow take 20mg daily for 2 days, then 10mg for 2 days and then stop, Cipro sent to [facility] take all med as prescribed. Pt verbs understanding

## 2018-10-15 NOTE — TELEPHONE ENCOUNTER
Pt stool positive for salmonella as reported by [facility] lab alexander. Dr. Clark notified orders given. Pt then notified

## 2018-10-15 NOTE — TELEPHONE ENCOUNTER
----- Message from Kel West sent at 10/15/2018 10:42 AM CDT -----  Type: Needs Medical Advice    Who Called:  Alexander Gonzalez  Lab    Best Call Back Number: [phone]  Additional Information: Caller states that she has lab work results for the patient that have to be given verbally

## 2018-10-19 ENCOUNTER — TELEPHONE (OUTPATIENT)
Dept: FAMILY MEDICINE | Facility: CLINIC | Age: 58
End: 2018-10-19

## 2018-10-19 NOTE — TELEPHONE ENCOUNTER
----- Message from Josie Torres sent at 10/19/2018  9:18 AM CDT -----  Contact: patient  Type: Needs Medical Advice    Who Called:  Patient  Symptoms (please be specific):    How long has patient had these symptoms:    Pharmacy name and phone #:    Best Call Back Number: [phone]  Additional Information: patient called to advise that she is on the way. Advise patient of time of appointment

## 2018-10-24 ENCOUNTER — OFFICE VISIT (OUTPATIENT)
Dept: GASTROENTEROLOGY | Facility: CLINIC | Age: 58
End: 2018-10-24
Payer: MEDICARE

## 2018-10-24 VITALS — BODY MASS INDEX: 24.03 KG/M2 | WEIGHT: 149.5 LBS | HEIGHT: 66 IN

## 2018-10-24 DIAGNOSIS — A02.9 SALMONELLA: ICD-10-CM

## 2018-10-24 DIAGNOSIS — K51.011 ULCERATIVE PANCOLITIS WITH RECTAL BLEEDING: Primary | ICD-10-CM

## 2018-10-24 PROCEDURE — 99213 OFFICE O/P EST LOW 20 MIN: CPT | Mod: S$PBB,,, | Performed by: INTERNAL MEDICINE

## 2018-10-24 PROCEDURE — 3008F BODY MASS INDEX DOCD: CPT | Mod: CPTII,,, | Performed by: INTERNAL MEDICINE

## 2018-10-24 PROCEDURE — 99999 PR PBB SHADOW E&M-EST. PATIENT-LVL II: CPT | Mod: PBBFAC,,, | Performed by: INTERNAL MEDICINE

## 2018-10-24 PROCEDURE — 99212 OFFICE O/P EST SF 10 MIN: CPT | Mod: PBBFAC,PO | Performed by: INTERNAL MEDICINE

## 2018-10-25 DIAGNOSIS — M21.372 FOOT DROP, BILATERAL: ICD-10-CM

## 2018-10-25 DIAGNOSIS — G89.21 CHRONIC PAIN DUE TO TRAUMA: ICD-10-CM

## 2018-10-25 DIAGNOSIS — M21.371 FOOT DROP, BILATERAL: ICD-10-CM

## 2018-10-25 RX ORDER — CYCLOBENZAPRINE HCL 10 MG
TABLET ORAL
Qty: 90 TABLET | Refills: 0 | Status: SHIPPED | OUTPATIENT
Start: 2018-10-25 | End: 2018-12-28 | Stop reason: SDUPTHER

## 2018-12-05 ENCOUNTER — TELEPHONE (OUTPATIENT)
Dept: INTERNAL MEDICINE | Facility: CLINIC | Age: 58
End: 2018-12-05

## 2018-12-05 NOTE — TELEPHONE ENCOUNTER
Can you please direct Ms. Tucker to speak with the inpatient treating team. I do not have a consent to discuss with her at this time. I will also contact admitting team. Thank you.

## 2018-12-28 DIAGNOSIS — M21.371 FOOT DROP, BILATERAL: ICD-10-CM

## 2018-12-28 DIAGNOSIS — M21.372 FOOT DROP, BILATERAL: ICD-10-CM

## 2018-12-28 DIAGNOSIS — G89.21 CHRONIC PAIN DUE TO TRAUMA: ICD-10-CM

## 2018-12-28 RX ORDER — CYCLOBENZAPRINE HCL 10 MG
10 TABLET ORAL 3 TIMES DAILY PRN
Qty: 90 TABLET | Refills: 0 | Status: SHIPPED | OUTPATIENT
Start: 2018-12-28 | End: 2019-03-23 | Stop reason: SDUPTHER

## 2018-12-28 RX ORDER — HYDROCHLOROTHIAZIDE 12.5 MG/1
12.5 CAPSULE ORAL DAILY
Qty: 30 CAPSULE | Refills: 0 | Status: SHIPPED | OUTPATIENT
Start: 2018-12-28 | End: 2019-01-28 | Stop reason: SDUPTHER

## 2018-12-28 RX ORDER — HYDROCHLOROTHIAZIDE 12.5 MG/1
12.5 CAPSULE ORAL DAILY
COMMUNITY
End: 2018-12-28 | Stop reason: SDUPTHER

## 2018-12-28 RX ORDER — DEXTROAMPHETAMINE SACCHARATE, AMPHETAMINE ASPARTATE MONOHYDRATE, DEXTROAMPHETAMINE SULFATE AND AMPHETAMINE SULFATE 5; 5; 5; 5 MG/1; MG/1; MG/1; MG/1
CAPSULE, EXTENDED RELEASE ORAL
Qty: 30 CAPSULE | Refills: 0 | OUTPATIENT
Start: 2018-12-28

## 2018-12-28 NOTE — TELEPHONE ENCOUNTER
Patient has not been seen regarding ADD in much longer than 3 months, she needs to be seen before any additional refills. I have sent one month of the other 2 medications, but she needs to be seen.

## 2018-12-28 NOTE — TELEPHONE ENCOUNTER
----- Message from Dank Renteria sent at 12/28/2018  3:55 PM CST -----  Contact: Patient  Type:  RX Refill Request    Who Called:  Patient  Refill or New Rx:  Refill  RX Name and Strength:   Adderall XR 20 MG Cap  hydroCHLOROthiazide 12.5MG   cyclo benzaprine 10 MG    Is this a 30 day or 90 day RX:  30  [facility] Pharm[facility] with phone number[facility] [place] [facility] [address]  [address]  [place] [address]  Phone: [phone] Fax: [phone]  Local or Mail Order:  Local  Ordering Provider:  Grady Wright Call Back Number:  [phone]

## 2019-01-08 ENCOUNTER — TELEPHONE (OUTPATIENT)
Dept: FAMILY MEDICINE | Facility: CLINIC | Age: 59
End: 2019-01-08

## 2019-01-08 NOTE — TELEPHONE ENCOUNTER
----- Message from Celine Alvarez sent at 1/8/2019 10:24 AM CST -----  Contact: Self  Patients right leg brace broke when she fell basic AFO and requesting that you fax over a new prescription to [facility] Phone [phone] Fax [phone].  She has an appt on this Thursday 1/10 so please get this sent over before then.  Thanks

## 2019-01-08 NOTE — TELEPHONE ENCOUNTER
Patient states that she fell on knee and shattered leg brace. Prescription for AFO right leg brace. Needs to be faxed to [facility] number is below. States that she as appt on 1/10/19. Please advise

## 2019-01-28 ENCOUNTER — TELEPHONE (OUTPATIENT)
Dept: FAMILY MEDICINE | Facility: CLINIC | Age: 59
End: 2019-01-28

## 2019-01-28 ENCOUNTER — OFFICE VISIT (OUTPATIENT)
Dept: FAMILY MEDICINE | Facility: CLINIC | Age: 59
End: 2019-01-28
Payer: MEDICARE

## 2019-01-28 VITALS
DIASTOLIC BLOOD PRESSURE: 66 MMHG | SYSTOLIC BLOOD PRESSURE: 110 MMHG | WEIGHT: 152.69 LBS | TEMPERATURE: 99 F | OXYGEN SATURATION: 99 % | HEART RATE: 76 BPM | BODY MASS INDEX: 24.54 KG/M2 | HEIGHT: 66 IN

## 2019-01-28 DIAGNOSIS — D18.03 HEPATIC HEMANGIOMA: ICD-10-CM

## 2019-01-28 DIAGNOSIS — Z87.442 HISTORY OF NEPHROLITHIASIS: ICD-10-CM

## 2019-01-28 DIAGNOSIS — K51.011 ULCERATIVE PANCOLITIS WITH RECTAL BLEEDING: Primary | ICD-10-CM

## 2019-01-28 DIAGNOSIS — J45.30 MILD PERSISTENT ASTHMA WITHOUT COMPLICATION: ICD-10-CM

## 2019-01-28 DIAGNOSIS — F98.8 ATTENTION DEFICIT DISORDER, UNSPECIFIED HYPERACTIVITY PRESENCE: ICD-10-CM

## 2019-01-28 PROBLEM — N20.0 NEPHROLITHIASIS: Status: RESOLVED | Noted: 2017-05-22 | Resolved: 2019-01-28

## 2019-01-28 PROBLEM — R10.84 GENERALIZED ABDOMINAL PAIN: Status: RESOLVED | Noted: 2018-10-08 | Resolved: 2019-01-28

## 2019-01-28 PROCEDURE — 99499 RISK ADDL DX/OHS AUDIT: ICD-10-PCS | Mod: S$GLB,,, | Performed by: FAMILY MEDICINE

## 2019-01-28 PROCEDURE — 99999 PR PBB SHADOW E&M-EST. PATIENT-LVL III: CPT | Mod: PBBFAC,,, | Performed by: FAMILY MEDICINE

## 2019-01-28 PROCEDURE — 3008F BODY MASS INDEX DOCD: CPT | Mod: CPTII,S$GLB,, | Performed by: FAMILY MEDICINE

## 2019-01-28 PROCEDURE — 3008F PR BODY MASS INDEX (BMI) DOCUMENTED: ICD-10-PCS | Mod: CPTII,S$GLB,, | Performed by: FAMILY MEDICINE

## 2019-01-28 PROCEDURE — 99214 OFFICE O/P EST MOD 30 MIN: CPT | Mod: S$GLB,,, | Performed by: FAMILY MEDICINE

## 2019-01-28 PROCEDURE — 99214 PR OFFICE/OUTPT VISIT, EST, LEVL IV, 30-39 MIN: ICD-10-PCS | Mod: S$GLB,,, | Performed by: FAMILY MEDICINE

## 2019-01-28 PROCEDURE — 99999 PR PBB SHADOW E&M-EST. PATIENT-LVL III: ICD-10-PCS | Mod: PBBFAC,,, | Performed by: FAMILY MEDICINE

## 2019-01-28 PROCEDURE — 99499 UNLISTED E&M SERVICE: CPT | Mod: S$GLB,,, | Performed by: FAMILY MEDICINE

## 2019-01-28 RX ORDER — MESALAMINE 1.2 G/1
1.2 TABLET, DELAYED RELEASE ORAL
COMMUNITY
End: 2020-08-24

## 2019-01-28 RX ORDER — HYDROCHLOROTHIAZIDE 12.5 MG/1
12.5 CAPSULE ORAL DAILY
Qty: 90 CAPSULE | Refills: 1 | Status: SHIPPED | OUTPATIENT
Start: 2019-01-28 | End: 2019-05-16 | Stop reason: SDUPTHER

## 2019-01-28 RX ORDER — DEXTROAMPHETAMINE SACCHARATE, AMPHETAMINE ASPARTATE MONOHYDRATE, DEXTROAMPHETAMINE SULFATE AND AMPHETAMINE SULFATE 5; 5; 5; 5 MG/1; MG/1; MG/1; MG/1
CAPSULE, EXTENDED RELEASE ORAL
Qty: 30 CAPSULE | Refills: 0 | Status: SHIPPED | OUTPATIENT
Start: 2019-03-28 | End: 2019-07-09 | Stop reason: SDUPTHER

## 2019-01-28 RX ORDER — DEXTROAMPHETAMINE SACCHARATE, AMPHETAMINE ASPARTATE MONOHYDRATE, DEXTROAMPHETAMINE SULFATE AND AMPHETAMINE SULFATE 5; 5; 5; 5 MG/1; MG/1; MG/1; MG/1
20 CAPSULE, EXTENDED RELEASE ORAL EVERY MORNING
Qty: 30 CAPSULE | Refills: 0 | Status: SHIPPED | OUTPATIENT
Start: 2019-01-28 | End: 2019-07-09 | Stop reason: SDUPTHER

## 2019-01-28 RX ORDER — BUDESONIDE AND FORMOTEROL FUMARATE DIHYDRATE 80; 4.5 UG/1; UG/1
2 AEROSOL RESPIRATORY (INHALATION) 2 TIMES DAILY
Qty: 3 INHALER | Refills: 1 | Status: SHIPPED | OUTPATIENT
Start: 2019-01-28 | End: 2022-09-27

## 2019-01-28 RX ORDER — ALBUTEROL SULFATE 1.25 MG/3ML
1.25 SOLUTION RESPIRATORY (INHALATION) 3 TIMES DAILY PRN
Qty: 3 BOX | Refills: 3 | Status: SHIPPED | OUTPATIENT
Start: 2019-01-28 | End: 2019-01-28

## 2019-01-28 RX ORDER — ALBUTEROL SULFATE 0.83 MG/ML
2.5 SOLUTION RESPIRATORY (INHALATION) EVERY 6 HOURS PRN
Qty: 3 BOX | Refills: 3 | Status: SHIPPED | OUTPATIENT
Start: 2019-01-28 | End: 2020-01-28

## 2019-01-28 RX ORDER — ALBUTEROL SULFATE 90 UG/1
1-2 AEROSOL, METERED RESPIRATORY (INHALATION) EVERY 6 HOURS PRN
Qty: 18 G | Refills: 3 | Status: SHIPPED | OUTPATIENT
Start: 2019-01-28 | End: 2020-02-11

## 2019-01-28 RX ORDER — DEXTROAMPHETAMINE SACCHARATE, AMPHETAMINE ASPARTATE MONOHYDRATE, DEXTROAMPHETAMINE SULFATE AND AMPHETAMINE SULFATE 5; 5; 5; 5 MG/1; MG/1; MG/1; MG/1
20 CAPSULE, EXTENDED RELEASE ORAL EVERY MORNING
Qty: 30 CAPSULE | Refills: 0 | Status: SHIPPED | OUTPATIENT
Start: 2019-02-28 | End: 2019-07-09

## 2019-01-28 NOTE — TELEPHONE ENCOUNTER
Clarified which prescriptions patient needed that were called in. Advised that the ACCUNEB 1.25 mg was discontinued today.

## 2019-01-28 NOTE — PROGRESS NOTES
"Subjective:       Patient ID: Vesta Tucker is a 58 y.o. female.    Chief Complaint: Medication Refill    HPI  Patient in the office for med refill and review.  Notes that she had flare of UC in October. Followed in gi/[place]. Subsequent salmonellosis, completed abx therapy.    Secondary flare btw xmas and new years. Stomach is better, monitors diet.   Stable adderall use. A lot of stress currently dealing with the paperwork and estate. Feels like she is handling grieving ok.   Uses accuneb 2-3x/day, more when the cold weather comes in.     Review of Systems:  Review of Systems   Constitutional: Negative for appetite change, fatigue, fever and unexpected weight change (normalized after hosp).   HENT: Negative for congestion, postnasal drip, rhinorrhea, sinus pressure and sore throat.         Prev flu sx resolved.   Eyes: Negative for discharge and itching.   Respiratory: Negative for cough, chest tightness and shortness of breath.    Cardiovascular: Positive for chest pain (attributes this to prev stable, since resolved). Negative for palpitations and leg swelling.   Gastrointestinal: Positive for diarrhea. Negative for constipation.   Genitourinary:        Self catherization due to spinal cord injury.    Musculoskeletal: Negative for arthralgias and myalgias.        Returned to exercise.   Skin: Negative for rash.   Neurological: Negative for tremors and headaches.   Hematological: Negative for adenopathy. Does not bruise/bleed easily.   Psychiatric/Behavioral: Negative for dysphoric mood and sleep disturbance.       Objective:     Vitals:    01/28/19 0903   BP: 110/66   BP Location: Left arm   Patient Position: Sitting   BP Method: Large (Manual)   Pulse: 76   Temp: 98.8 °F (37.1 °C)   TempSrc: Oral   SpO2: 99%   Weight: 69.3 kg (152 lb 10.7 oz)   Height: 5' 6" (1.676 m)          Physical Exam   Constitutional: She is oriented to person, place, and time. She appears well-developed and well-nourished. No " distress.   HENT:   Head: Normocephalic and atraumatic.   Mouth/Throat: Oropharynx is clear and moist.   Eyes: Conjunctivae are normal. Right eye exhibits no discharge. Left eye exhibits no discharge. No scleral icterus.   Neck: Normal range of motion. Neck supple.   Cardiovascular: Normal rate and regular rhythm.   Pulmonary/Chest: Effort normal and breath sounds normal. No respiratory distress.   Abdominal: Soft. She exhibits no distension.   Musculoskeletal: Normal range of motion. She exhibits no edema.   Lymphadenopathy:     She has no cervical adenopathy.   Neurological: She is alert and oriented to person, place, and time. No cranial nerve deficit.   Skin: Skin is warm and dry. No rash noted.   Psychiatric: She has a normal mood and affect. Her behavior is normal.   Nursing note and vitals reviewed.        Assessment & Plan:  Ulcerative pancolitis with rectal bleeding  Maintain gi f/u. Cont lialda.  Mild persistent asthma without complication  -     albuterol (ACCUNEB) 1.25 mg/3 mL Nebu; Take 3 mLs (1.25 mg total) by nebulization 3 (three) times daily as needed.  Dispense: 3 Box; Refill: 3  Cont symbicort, accuneb 2-3x/day.  Attention deficit disorder, unspecified hyperactivity presence  3 months of rx printed. Medication compliance, sx monitoring, prescription responsibility and non-diversion reviewed. Patient aware that I do not provide reprints for lost rx. Had no questions or concerns.   Hepatic hemangioma  -     US Abdomen Complete; Future; Expected date: 07/28/2019  Repeat imaging later this year.   History of nephrolithiasis  Cont hctz.  Other orders  -     budesonide-formoterol 80-4.5 mcg (SYMBICORT) 80-4.5 mcg/actuation HFAA; Inhale 2 puffs into the lungs 2 (two) times daily. Controller  Dispense: 3 Inhaler; Refill: 1  -     hydroCHLOROthiazide (MICROZIDE) 12.5 mg capsule; Take 1 capsule (12.5 mg total) by mouth once daily.  Dispense: 90 capsule; Refill: 1  -     dextroamphetamine-amphetamine  (ADDERALL XR) 20 MG 24 hr capsule; TAKE 1 CAPSULE BY MOUTH EVERY MORNING  Dispense: 30 capsule; Refill: 0  -     dextroamphetamine-amphetamine (ADDERALL XR) 20 MG 24 hr capsule; Take 1 capsule (20 mg total) by mouth every morning.  Dispense: 30 capsule; Refill: 0  -     dextroamphetamine-amphetamine (ADDERALL XR) 20 MG 24 hr capsule; Take 1 capsule (20 mg total) by mouth every morning.  Dispense: 30 capsule; Refill: 0

## 2019-01-28 NOTE — TELEPHONE ENCOUNTER
----- Message from Princess Glass sent at 1/28/2019 10:10 AM CST -----  Type:  Pharmacy Calling to Clarify an RX    Name of Caller:  Pauly  Pharmacy Name:  [facility]  Prescription Name:  Ventolin, Albuterol Sulfate, Albuterol Sulfate different mg  What do they need to clarify?:  Which drug do you want to use  Best Call Back Number:  [phone]

## 2019-03-23 DIAGNOSIS — M21.371 FOOT DROP, BILATERAL: ICD-10-CM

## 2019-03-23 DIAGNOSIS — G89.21 CHRONIC PAIN DUE TO TRAUMA: ICD-10-CM

## 2019-03-23 DIAGNOSIS — M21.372 FOOT DROP, BILATERAL: ICD-10-CM

## 2019-03-25 RX ORDER — CYCLOBENZAPRINE HCL 10 MG
TABLET ORAL
Qty: 90 TABLET | Refills: 0 | Status: SHIPPED | OUTPATIENT
Start: 2019-03-25 | End: 2019-05-08 | Stop reason: SDUPTHER

## 2019-03-25 NOTE — PROGRESS NOTES
Refill Authorization Note     is requesting a refill authorization.    Brief assessment and rationale for refill: ROUTE: op   Name and strength of medication: cyclobenzaprine (FLEXERIL) 10 MG tablet            Medication reconciliation completed: No                         Comments:  Appointments (past 12 m or future  authorizing provider)  Last visit with PCP: Shaina Rasmussen MD:1/28/2019         Next visit with PCP:   Shaina Rasmussen MD:Visit date not found

## 2019-05-08 DIAGNOSIS — M21.371 FOOT DROP, BILATERAL: ICD-10-CM

## 2019-05-08 DIAGNOSIS — M21.372 FOOT DROP, BILATERAL: ICD-10-CM

## 2019-05-08 DIAGNOSIS — G89.21 CHRONIC PAIN DUE TO TRAUMA: ICD-10-CM

## 2019-05-08 RX ORDER — CYCLOBENZAPRINE HCL 10 MG
TABLET ORAL
Qty: 90 TABLET | Refills: 1 | Status: SHIPPED | OUTPATIENT
Start: 2019-05-08 | End: 2019-07-12 | Stop reason: SDUPTHER

## 2019-05-16 NOTE — TELEPHONE ENCOUNTER
Pt Of Shaina Rasmussen MD    Last seen on: 1/28/19    Next appt: N/A    Last refill: 1/28/19    Allergies:   Review of patient's allergies indicates:   Allergen Reactions    Adhesive      tears skin  blisters    Hydromorphone      Other reaction(s): Hypotension       Pharmacy:   [facility] [place] - [address]  [address]  [facility] [address]  Phone: [phone] Fax: [phone]        Please review! Thank you!

## 2019-05-17 RX ORDER — HYDROCHLOROTHIAZIDE 12.5 MG/1
12.5 CAPSULE ORAL DAILY
Qty: 90 CAPSULE | Refills: 1 | Status: SHIPPED | OUTPATIENT
Start: 2019-05-17 | End: 2019-10-28 | Stop reason: SDUPTHER

## 2019-05-31 ENCOUNTER — TELEPHONE (OUTPATIENT)
Dept: ADMINISTRATIVE | Facility: HOSPITAL | Age: 59
End: 2019-05-31

## 2019-05-31 DIAGNOSIS — Z12.39 BREAST CANCER SCREENING: ICD-10-CM

## 2019-06-04 ENCOUNTER — PATIENT OUTREACH (OUTPATIENT)
Dept: ADMINISTRATIVE | Facility: HOSPITAL | Age: 59
End: 2019-06-04

## 2019-06-12 RX ORDER — DEXTROAMPHETAMINE SACCHARATE, AMPHETAMINE ASPARTATE MONOHYDRATE, DEXTROAMPHETAMINE SULFATE AND AMPHETAMINE SULFATE 5; 5; 5; 5 MG/1; MG/1; MG/1; MG/1
CAPSULE, EXTENDED RELEASE ORAL
Qty: 30 CAPSULE | Refills: 0 | Status: SHIPPED | OUTPATIENT
Start: 2019-06-12 | End: 2019-07-09 | Stop reason: SDUPTHER

## 2019-06-14 ENCOUNTER — LAB VISIT (OUTPATIENT)
Dept: LAB | Facility: HOSPITAL | Age: 59
End: 2019-06-14
Attending: FAMILY MEDICINE
Payer: MEDICARE

## 2019-06-14 DIAGNOSIS — Z79.899 HIGH RISK MEDICATIONS (NOT ANTICOAGULANTS) LONG-TERM USE: ICD-10-CM

## 2019-06-14 DIAGNOSIS — Z11.59 ENCOUNTER FOR HEPATITIS C SCREENING TEST FOR LOW RISK PATIENT: ICD-10-CM

## 2019-06-14 LAB
ALBUMIN SERPL BCP-MCNC: 3.9 G/DL (ref 3.5–5.2)
ALP SERPL-CCNC: 80 U/L (ref 55–135)
ALT SERPL W/O P-5'-P-CCNC: 43 U/L (ref 10–44)
ANION GAP SERPL CALC-SCNC: 8 MMOL/L (ref 8–16)
AST SERPL-CCNC: 32 U/L (ref 10–40)
BASOPHILS # BLD AUTO: 0.05 K/UL (ref 0–0.2)
BASOPHILS NFR BLD: 0.8 % (ref 0–1.9)
BILIRUB SERPL-MCNC: 0.5 MG/DL (ref 0.1–1)
BUN SERPL-MCNC: 11 MG/DL (ref 6–20)
CALCIUM SERPL-MCNC: 9.6 MG/DL (ref 8.7–10.5)
CHLORIDE SERPL-SCNC: 101 MMOL/L (ref 95–110)
CHOLEST SERPL-MCNC: 220 MG/DL (ref 120–199)
CHOLEST/HDLC SERPL: 3.9 {RATIO} (ref 2–5)
CO2 SERPL-SCNC: 30 MMOL/L (ref 23–29)
CREAT SERPL-MCNC: 0.7 MG/DL (ref 0.5–1.4)
DIFFERENTIAL METHOD: ABNORMAL
EOSINOPHIL # BLD AUTO: 0.1 K/UL (ref 0–0.5)
EOSINOPHIL NFR BLD: 1.9 % (ref 0–8)
ERYTHROCYTE [DISTWIDTH] IN BLOOD BY AUTOMATED COUNT: 14.6 % (ref 11.5–14.5)
EST. GFR  (AFRICAN AMERICAN): >60 ML/MIN/1.73 M^2
EST. GFR  (NON AFRICAN AMERICAN): >60 ML/MIN/1.73 M^2
GLUCOSE SERPL-MCNC: 95 MG/DL (ref 70–110)
HCT VFR BLD AUTO: 46 % (ref 37–48.5)
HDLC SERPL-MCNC: 57 MG/DL (ref 40–75)
HDLC SERPL: 25.9 % (ref 20–50)
HGB BLD-MCNC: 14.3 G/DL (ref 12–16)
IMM GRANULOCYTES # BLD AUTO: 0.02 K/UL (ref 0–0.04)
IMM GRANULOCYTES NFR BLD AUTO: 0.3 % (ref 0–0.5)
LDLC SERPL CALC-MCNC: 142.8 MG/DL (ref 63–159)
LYMPHOCYTES # BLD AUTO: 2.3 K/UL (ref 1–4.8)
LYMPHOCYTES NFR BLD: 36.6 % (ref 18–48)
MCH RBC QN AUTO: 29.5 PG (ref 27–31)
MCHC RBC AUTO-ENTMCNC: 31.1 G/DL (ref 32–36)
MCV RBC AUTO: 95 FL (ref 82–98)
MONOCYTES # BLD AUTO: 0.5 K/UL (ref 0.3–1)
MONOCYTES NFR BLD: 8.6 % (ref 4–15)
NEUTROPHILS # BLD AUTO: 3.3 K/UL (ref 1.8–7.7)
NEUTROPHILS NFR BLD: 51.8 % (ref 38–73)
NONHDLC SERPL-MCNC: 163 MG/DL
NRBC BLD-RTO: 0 /100 WBC
PLATELET # BLD AUTO: 304 K/UL (ref 150–350)
PMV BLD AUTO: 10.8 FL (ref 9.2–12.9)
POTASSIUM SERPL-SCNC: 4.1 MMOL/L (ref 3.5–5.1)
PROT SERPL-MCNC: 7.1 G/DL (ref 6–8.4)
RBC # BLD AUTO: 4.85 M/UL (ref 4–5.4)
SODIUM SERPL-SCNC: 139 MMOL/L (ref 136–145)
TRIGL SERPL-MCNC: 101 MG/DL (ref 30–150)
TSH SERPL DL<=0.005 MIU/L-ACNC: 1.91 UIU/ML (ref 0.4–4)
WBC # BLD AUTO: 6.31 K/UL (ref 3.9–12.7)

## 2019-06-14 PROCEDURE — 36415 COLL VENOUS BLD VENIPUNCTURE: CPT | Mod: PN

## 2019-06-14 PROCEDURE — 86803 HEPATITIS C AB TEST: CPT

## 2019-06-14 PROCEDURE — 80061 LIPID PANEL: CPT

## 2019-06-14 PROCEDURE — 80053 COMPREHEN METABOLIC PANEL: CPT

## 2019-06-14 PROCEDURE — 85025 COMPLETE CBC W/AUTO DIFF WBC: CPT

## 2019-06-14 PROCEDURE — 84443 ASSAY THYROID STIM HORMONE: CPT

## 2019-06-17 LAB — HCV AB SERPL QL IA: NEGATIVE

## 2019-06-25 ENCOUNTER — PATIENT OUTREACH (OUTPATIENT)
Dept: ADMINISTRATIVE | Facility: HOSPITAL | Age: 59
End: 2019-06-25

## 2019-06-25 NOTE — LETTER
June 25, 2019    Vesta Tucker  [address] [address]             [facility]  [address]  [place] [address]  Phone: [phone] Dear Mrs. Tucker:    [facility] is committed to your overall health.  To help you get the most out of each of your visits, we will review your information to make sure you are up to date on all of your recommended tests and/or procedures.      Dr. Shaina Rasmussen MD has found that your chart shows you may be due for     Health Maintenance Due   Topic    TETANUS VACCINE     Pneumococcal Vaccine (Medium Risk) (1 of 1 - PPSV23)    Pap Smear with HPV Cotest     Mammogram        If you have had any of the above done at another facility, please bring the records or information with you so that your record at [facility] will be complete.  If you would like to schedule any of these, please contact me.    If you are currently taking medication, please bring it with you to your appointment for review.        MD Rodrigo Ward LPN Clinical Care Coordinator  [facility]  [address] [facility] [address]  [place] [address]  [phone] (p)   [phone] (f)

## 2019-06-25 NOTE — PROGRESS NOTES
Pre-visit Health Maintenance Review 06/25/2019    Future Appointments   Date Time Provider Department Center   7/9/2019  2:20 PM Shaina Rasmussen MD [facility]

## 2019-07-09 ENCOUNTER — OFFICE VISIT (OUTPATIENT)
Dept: FAMILY MEDICINE | Facility: CLINIC | Age: 59
End: 2019-07-09
Payer: MEDICARE

## 2019-07-09 VITALS
WEIGHT: 160.38 LBS | SYSTOLIC BLOOD PRESSURE: 122 MMHG | HEIGHT: 66 IN | BODY MASS INDEX: 25.78 KG/M2 | TEMPERATURE: 99 F | HEART RATE: 96 BPM | DIASTOLIC BLOOD PRESSURE: 68 MMHG | RESPIRATION RATE: 16 BRPM

## 2019-07-09 DIAGNOSIS — J45.30 MILD PERSISTENT ASTHMA WITHOUT COMPLICATION: ICD-10-CM

## 2019-07-09 DIAGNOSIS — F98.8 ATTENTION DEFICIT DISORDER, UNSPECIFIED HYPERACTIVITY PRESENCE: ICD-10-CM

## 2019-07-09 DIAGNOSIS — K51.011 ULCERATIVE PANCOLITIS WITH RECTAL BLEEDING: Primary | ICD-10-CM

## 2019-07-09 DIAGNOSIS — K21.9 GASTROESOPHAGEAL REFLUX DISEASE, ESOPHAGITIS PRESENCE NOT SPECIFIED: ICD-10-CM

## 2019-07-09 DIAGNOSIS — F41.9 ANXIETY AND DEPRESSION: ICD-10-CM

## 2019-07-09 DIAGNOSIS — F32.A ANXIETY AND DEPRESSION: ICD-10-CM

## 2019-07-09 PROBLEM — R23.8 SKIN BREAKDOWN: Status: RESOLVED | Noted: 2017-07-03 | Resolved: 2019-07-09

## 2019-07-09 PROCEDURE — 99999 PR PBB SHADOW E&M-EST. PATIENT-LVL IV: CPT | Mod: PBBFAC,,, | Performed by: FAMILY MEDICINE

## 2019-07-09 PROCEDURE — 3008F PR BODY MASS INDEX (BMI) DOCUMENTED: ICD-10-PCS | Mod: CPTII,S$GLB,, | Performed by: FAMILY MEDICINE

## 2019-07-09 PROCEDURE — 99214 OFFICE O/P EST MOD 30 MIN: CPT | Mod: S$GLB,,, | Performed by: FAMILY MEDICINE

## 2019-07-09 PROCEDURE — 99499 UNLISTED E&M SERVICE: CPT | Mod: S$GLB,,, | Performed by: FAMILY MEDICINE

## 2019-07-09 PROCEDURE — 99214 PR OFFICE/OUTPT VISIT, EST, LEVL IV, 30-39 MIN: ICD-10-PCS | Mod: S$GLB,,, | Performed by: FAMILY MEDICINE

## 2019-07-09 PROCEDURE — 3008F BODY MASS INDEX DOCD: CPT | Mod: CPTII,S$GLB,, | Performed by: FAMILY MEDICINE

## 2019-07-09 PROCEDURE — 99999 PR PBB SHADOW E&M-EST. PATIENT-LVL IV: ICD-10-PCS | Mod: PBBFAC,,, | Performed by: FAMILY MEDICINE

## 2019-07-09 PROCEDURE — 99499 RISK ADDL DX/OHS AUDIT: ICD-10-PCS | Mod: S$GLB,,, | Performed by: FAMILY MEDICINE

## 2019-07-09 RX ORDER — DEXTROAMPHETAMINE SACCHARATE, AMPHETAMINE ASPARTATE MONOHYDRATE, DEXTROAMPHETAMINE SULFATE AND AMPHETAMINE SULFATE 5; 5; 5; 5 MG/1; MG/1; MG/1; MG/1
CAPSULE, EXTENDED RELEASE ORAL
Qty: 30 CAPSULE | Refills: 0 | Status: SHIPPED | OUTPATIENT
Start: 2019-08-09 | End: 2020-01-22 | Stop reason: SDUPTHER

## 2019-07-09 RX ORDER — DEXTROAMPHETAMINE SACCHARATE, AMPHETAMINE ASPARTATE MONOHYDRATE, DEXTROAMPHETAMINE SULFATE AND AMPHETAMINE SULFATE 5; 5; 5; 5 MG/1; MG/1; MG/1; MG/1
20 CAPSULE, EXTENDED RELEASE ORAL EVERY MORNING
Qty: 30 CAPSULE | Refills: 0 | Status: SHIPPED | OUTPATIENT
Start: 2019-07-09 | End: 2020-01-22 | Stop reason: SDUPTHER

## 2019-07-09 RX ORDER — DEXTROAMPHETAMINE SACCHARATE, AMPHETAMINE ASPARTATE MONOHYDRATE, DEXTROAMPHETAMINE SULFATE AND AMPHETAMINE SULFATE 5; 5; 5; 5 MG/1; MG/1; MG/1; MG/1
CAPSULE, EXTENDED RELEASE ORAL
Qty: 30 CAPSULE | Refills: 0 | Status: SHIPPED | OUTPATIENT
Start: 2019-09-09 | End: 2020-02-11

## 2019-07-09 NOTE — PROGRESS NOTES
Subjective:       Patient ID: Vesta Tucker is a 59 y.o. female.    Chief Complaint: Follow-up (patient reports she still does not have control over her bowels )    HPI  Patient in the office for f/u and review.  She is concerned that lialda still not effective in management of UC. A lot of bowel frequency and irritability. Prev flares managed with liquid diet.  She's noticed an increase in gerd sx in conjunction as well. Estimates 4-5 days/week, ~1 week total.    Rare nsaid use.   Frustrated bc she's exercising, diet limited by GI. 1hr cardio, 20mins strength training.   Feels like her mood is ok given the circumstances.   Asthma is doing ok this season. She enjoys outdoor work.     Review of Systems:  Review of Systems   Constitutional: Negative for appetite change, fatigue, fever (low grade) and unexpected weight change (stable).   HENT: Negative for congestion, sinus pressure and sore throat.    Eyes: Negative for discharge and itching.   Respiratory: Negative for cough and shortness of breath.    Cardiovascular: Negative for palpitations and leg swelling.   Gastrointestinal: Positive for abdominal pain (cramping) and diarrhea. Negative for blood in stool and constipation.   Genitourinary: Negative for difficulty urinating and dysuria.        Self catherization due to spinal cord injury.    Musculoskeletal: Negative for arthralgias and myalgias.        Exercising regularly, no significant limitations.   Skin: Negative for rash and wound.   Neurological: Negative for tremors and headaches.   Hematological: Negative for adenopathy. Does not bruise/bleed easily.   Psychiatric/Behavioral: Negative for dysphoric mood (struggles with her UC sx, particularly of late) and sleep disturbance.       Objective:     Vitals:    07/09/19 1438   BP: 122/68   BP Location: Left arm   Patient Position: Sitting   BP Method: Large (Manual)   Pulse: 96   Resp: 16   Temp: 99.1 °F (37.3 °C)   TempSrc: Oral   Weight: 72.7 kg (160 lb  "6.2 oz)   Height: 5' 6" (1.676 m)          Physical Exam   Constitutional: She is oriented to person, place, and time. She appears well-developed and well-nourished. No distress.   HENT:   Head: Normocephalic and atraumatic.   Eyes: Conjunctivae are normal. Right eye exhibits no discharge. Left eye exhibits no discharge. No scleral icterus.   Neck: Normal range of motion. Neck supple.   Cardiovascular: Normal rate and regular rhythm.   Pulmonary/Chest: Effort normal and breath sounds normal. No respiratory distress.   Abdominal: Soft. She exhibits no distension.   Musculoskeletal: Normal range of motion. She exhibits no edema.   Neurological: She is alert and oriented to person, place, and time.   Skin: Skin is warm and dry. No rash noted.   Psychiatric: She has a normal mood and affect. Her behavior is normal.   Intermittently tearful   Nursing note and vitals reviewed.        Assessment & Plan:  Ulcerative pancolitis with rectal bleeding  -     Ambulatory referral to Psychology  Recommend counseling. Referred to Rachael Moss for review. Consider ssri tx as well.  Discuss sx and current flare with GI/guarisco. She is concerned about having to use biologic agents for sx management, but acknowledges that the current sx are not sustainable long term.   Anxiety and depression  -     Ambulatory referral to Psychology  As noted above.  Mild persistent asthma without complication  Controlled, cont regimen.   Attention deficit disorder, unspecified hyperactivity presence  -     dextroamphetamine-amphetamine (ADDERALL XR) 20 MG 24 hr capsule; TAKE 1 CAPSULE BY MOUTH EVERY MORNING  Dispense: 30 capsule; Refill: 0  -     dextroamphetamine-amphetamine (ADDERALL XR) 20 MG 24 hr capsule; TAKE 1 CAPSULE BY MOUTH EVERY MORNING  Dispense: 30 capsule; Refill: 0  -     dextroamphetamine-amphetamine (ADDERALL XR) 20 MG 24 hr capsule; Take 1 capsule (20 mg total) by mouth every morning.  Dispense: 30 capsule; Refill: 0  3 months " of rx printed. Medication compliance, sx monitoring, prescription responsibility and non-diversion reviewed. Patient aware that I do not provide reprints for lost rx. Had no questions or concerns.   Gastroesophageal reflux disease, esophagitis presence not specified  -     ranitidine (ZANTAC) 150 MG tablet; Take 1 tablet (150 mg total) by mouth 2 (two) times daily.  Dispense: 180 tablet; Refill: 1  h2 twice daily in lieu of ppi for now.

## 2019-07-12 DIAGNOSIS — G89.21 CHRONIC PAIN DUE TO TRAUMA: ICD-10-CM

## 2019-07-12 DIAGNOSIS — M21.372 FOOT DROP, BILATERAL: ICD-10-CM

## 2019-07-12 DIAGNOSIS — M21.371 FOOT DROP, BILATERAL: ICD-10-CM

## 2019-07-15 RX ORDER — CYCLOBENZAPRINE HCL 10 MG
TABLET ORAL
Qty: 90 TABLET | Refills: 1 | Status: SHIPPED | OUTPATIENT
Start: 2019-07-15 | End: 2019-10-08 | Stop reason: SDUPTHER

## 2019-08-20 ENCOUNTER — OFFICE VISIT (OUTPATIENT)
Dept: GASTROENTEROLOGY | Facility: CLINIC | Age: 59
End: 2019-08-20
Payer: MEDICARE

## 2019-08-20 VITALS — BODY MASS INDEX: 25.51 KG/M2 | RESPIRATION RATE: 18 BRPM | WEIGHT: 158.75 LBS | HEIGHT: 66 IN

## 2019-08-20 DIAGNOSIS — R19.7 DIARRHEA, UNSPECIFIED TYPE: ICD-10-CM

## 2019-08-20 DIAGNOSIS — K51.011 ULCERATIVE PANCOLITIS WITH RECTAL BLEEDING: Primary | ICD-10-CM

## 2019-08-20 PROCEDURE — 99499 UNLISTED E&M SERVICE: CPT | Mod: S$GLB,,, | Performed by: INTERNAL MEDICINE

## 2019-08-20 PROCEDURE — 99999 PR PBB SHADOW E&M-EST. PATIENT-LVL II: ICD-10-PCS | Mod: PBBFAC,,, | Performed by: INTERNAL MEDICINE

## 2019-08-20 PROCEDURE — 3008F BODY MASS INDEX DOCD: CPT | Mod: CPTII,S$GLB,, | Performed by: INTERNAL MEDICINE

## 2019-08-20 PROCEDURE — 99214 OFFICE O/P EST MOD 30 MIN: CPT | Mod: S$GLB,,, | Performed by: INTERNAL MEDICINE

## 2019-08-20 PROCEDURE — 99499 RISK ADDL DX/OHS AUDIT: ICD-10-PCS | Mod: S$GLB,,, | Performed by: INTERNAL MEDICINE

## 2019-08-20 PROCEDURE — 99214 PR OFFICE/OUTPT VISIT, EST, LEVL IV, 30-39 MIN: ICD-10-PCS | Mod: S$GLB,,, | Performed by: INTERNAL MEDICINE

## 2019-08-20 PROCEDURE — 99999 PR PBB SHADOW E&M-EST. PATIENT-LVL II: CPT | Mod: PBBFAC,,, | Performed by: INTERNAL MEDICINE

## 2019-08-20 PROCEDURE — 3008F PR BODY MASS INDEX (BMI) DOCUMENTED: ICD-10-PCS | Mod: CPTII,S$GLB,, | Performed by: INTERNAL MEDICINE

## 2019-08-20 RX ORDER — MESALAMINE 0.38 G/1
1.5 CAPSULE, EXTENDED RELEASE ORAL DAILY
Qty: 120 CAPSULE | Refills: 2 | Status: SHIPPED | OUTPATIENT
Start: 2019-08-20 | End: 2020-01-22 | Stop reason: DRUGHIGH

## 2019-08-20 NOTE — PROGRESS NOTES
Pt presents for evaluation of colitis. Recent C-scope 10/2018 diffuse pan-colitis (normal terminal ileum). Treated tapering course of prednisone and Lialda (prior salmonella treated with cipro). Did well for a while. Now with bilat lower abd cramps. Increased stool frequency, up to 6 bowel movements daily. Rare nocturnal diarrhea. Stool describes as semi-formed, soft, not watery. Positive small amounts of blood. No constipation. No N/V. Positive reflux. No dysphagia. No fever or jaundice. No rashes. Weight stable. Remains on Lialda 2.4 gm daily.     REVIEW OF SYSTEMS:   Constitutional: Negative for fever, appetite change and unexpected weight change.  HENT: Negative for sore throat and trouble swallowing.  Eyes: Negative for visual disturbance.  Respiratory: Negative for chest tightness, shortness of breath and wheezing.  Cardiovascular: Negative for chest pain.  Gastrointestinal:  as per HPI  Genitourinary: Negative for dysuria, frequency and hematuria.    PHYSICAL EXAMINATION:                                                        GENERAL:  Comfortable, in no acute distress.      SKIN: Non-jaundiced.                             HEENT EXAM:  Nonicteric.  No adenopathy.  Oropharynx is clear.               NECK:  Supple.                                                               LUNGS:  Clear.                                                               CARDIAC:  Regular rate and rhythm.  S1, S2.  No murmur.                      ABDOMEN:  Soft, positive bowel sounds, nontender.  No hepatosplenomegaly or masses.  No rebound or guarding.                                             EXTREMITIES:  No edema.       IMP: 1. U.C. - suboptimal control on current medication.    PLAN: 1. Stool evaluation, including c diff and calprotectin.             2. Increase Lilada to 4.8 gm daily             3. If stool negative for infection, and no response to higher dose Lialda, will repeat C-scope to evaluate extent and severity of  disease, before pursuing steroid taper and biologics.

## 2019-08-21 ENCOUNTER — LAB VISIT (OUTPATIENT)
Dept: LAB | Facility: HOSPITAL | Age: 59
End: 2019-08-21
Attending: INTERNAL MEDICINE
Payer: MEDICARE

## 2019-08-21 DIAGNOSIS — K51.011 ULCERATIVE PANCOLITIS WITH RECTAL BLEEDING: ICD-10-CM

## 2019-08-21 PROCEDURE — 87427 SHIGA-LIKE TOXIN AG IA: CPT | Mod: 59

## 2019-08-21 PROCEDURE — 87329 GIARDIA AG IA: CPT

## 2019-08-21 PROCEDURE — 87046 STOOL CULTR AEROBIC BACT EA: CPT

## 2019-08-21 PROCEDURE — 87045 FECES CULTURE AEROBIC BACT: CPT

## 2019-08-21 PROCEDURE — 87209 SMEAR COMPLEX STAIN: CPT

## 2019-08-22 LAB
CRYPTOSP AG STL QL IA: NEGATIVE
E COLI SXT1 STL QL IA: NEGATIVE
E COLI SXT2 STL QL IA: NEGATIVE
G LAMBLIA AG STL QL IA: NEGATIVE
O+P STL TRI STN: NORMAL

## 2019-08-24 LAB — BACTERIA STL CULT: NORMAL

## 2019-10-08 DIAGNOSIS — K21.9 GASTROESOPHAGEAL REFLUX DISEASE, ESOPHAGITIS PRESENCE NOT SPECIFIED: ICD-10-CM

## 2019-10-08 DIAGNOSIS — M21.372 FOOT DROP, BILATERAL: ICD-10-CM

## 2019-10-08 DIAGNOSIS — G89.21 CHRONIC PAIN DUE TO TRAUMA: ICD-10-CM

## 2019-10-08 DIAGNOSIS — M21.371 FOOT DROP, BILATERAL: ICD-10-CM

## 2019-10-09 RX ORDER — CYCLOBENZAPRINE HCL 10 MG
TABLET ORAL
Qty: 90 TABLET | Refills: 1 | Status: SHIPPED | OUTPATIENT
Start: 2019-10-09 | End: 2020-01-08

## 2019-10-28 RX ORDER — HYDROCHLOROTHIAZIDE 12.5 MG/1
CAPSULE ORAL
Qty: 90 CAPSULE | Refills: 1 | Status: SHIPPED | OUTPATIENT
Start: 2019-10-28 | End: 2020-05-14

## 2019-11-14 ENCOUNTER — PATIENT OUTREACH (OUTPATIENT)
Dept: ADMINISTRATIVE | Facility: HOSPITAL | Age: 59
End: 2019-11-14

## 2019-12-24 DIAGNOSIS — F98.8 ATTENTION DEFICIT DISORDER, UNSPECIFIED HYPERACTIVITY PRESENCE: ICD-10-CM

## 2019-12-24 RX ORDER — DEXTROAMPHETAMINE SACCHARATE, AMPHETAMINE ASPARTATE MONOHYDRATE, DEXTROAMPHETAMINE SULFATE AND AMPHETAMINE SULFATE 5; 5; 5; 5 MG/1; MG/1; MG/1; MG/1
CAPSULE, EXTENDED RELEASE ORAL
Qty: 30 CAPSULE | Refills: 0 | OUTPATIENT
Start: 2019-12-24

## 2020-01-07 DIAGNOSIS — G89.21 CHRONIC PAIN DUE TO TRAUMA: ICD-10-CM

## 2020-01-07 DIAGNOSIS — M21.371 FOOT DROP, BILATERAL: ICD-10-CM

## 2020-01-07 DIAGNOSIS — M21.372 FOOT DROP, BILATERAL: ICD-10-CM

## 2020-01-08 RX ORDER — CYCLOBENZAPRINE HCL 10 MG
TABLET ORAL
Qty: 90 TABLET | Refills: 1 | Status: SHIPPED | OUTPATIENT
Start: 2020-01-08 | End: 2020-05-21

## 2020-01-21 ENCOUNTER — TELEPHONE (OUTPATIENT)
Dept: FAMILY MEDICINE | Facility: CLINIC | Age: 60
End: 2020-01-21

## 2020-01-21 NOTE — TELEPHONE ENCOUNTER
----- Message from Erum Feliciano sent at 1/21/2020  9:10 AM CST -----  Contact: Pt  Type: Needs Medical Advice    Who Called:   Pt  Symptoms (please be specific):  Left knee pain  How long has patient had these symptoms:  yesterday  Best Call Back Number: [phone]  Additional Information: pt stated she fell yesterday the patient has an appointment but wants to know what can she do til then

## 2020-01-21 NOTE — TELEPHONE ENCOUNTER
Pt states she twisted her left knee yesterday afternoon.. Pt denies fall . Pt reports minimal swelling . Pt is able to walk and put pressure on it . Pt advised to keep her knee elevated , ice , she can alternate Tylenol and Motrin. Pt has appt minda am .--lp

## 2020-01-22 ENCOUNTER — OFFICE VISIT (OUTPATIENT)
Dept: FAMILY MEDICINE | Facility: CLINIC | Age: 60
End: 2020-01-22
Payer: MEDICARE

## 2020-01-22 VITALS
WEIGHT: 159.19 LBS | OXYGEN SATURATION: 96 % | HEIGHT: 66 IN | SYSTOLIC BLOOD PRESSURE: 102 MMHG | BODY MASS INDEX: 25.58 KG/M2 | HEART RATE: 78 BPM | DIASTOLIC BLOOD PRESSURE: 60 MMHG

## 2020-01-22 DIAGNOSIS — M25.562 ACUTE PAIN OF LEFT KNEE: Primary | ICD-10-CM

## 2020-01-22 PROCEDURE — 99214 OFFICE O/P EST MOD 30 MIN: CPT | Mod: S$GLB,,, | Performed by: FAMILY MEDICINE

## 2020-01-22 PROCEDURE — 99214 PR OFFICE/OUTPT VISIT, EST, LEVL IV, 30-39 MIN: ICD-10-PCS | Mod: S$GLB,,, | Performed by: FAMILY MEDICINE

## 2020-01-22 PROCEDURE — 99999 PR PBB SHADOW E&M-EST. PATIENT-LVL IV: CPT | Mod: PBBFAC,,, | Performed by: FAMILY MEDICINE

## 2020-01-22 PROCEDURE — 3008F BODY MASS INDEX DOCD: CPT | Mod: CPTII,S$GLB,, | Performed by: FAMILY MEDICINE

## 2020-01-22 PROCEDURE — 99999 PR PBB SHADOW E&M-EST. PATIENT-LVL IV: ICD-10-PCS | Mod: PBBFAC,,, | Performed by: FAMILY MEDICINE

## 2020-01-22 PROCEDURE — 3008F PR BODY MASS INDEX (BMI) DOCUMENTED: ICD-10-PCS | Mod: CPTII,S$GLB,, | Performed by: FAMILY MEDICINE

## 2020-01-22 RX ORDER — IBUPROFEN 800 MG/1
800 TABLET ORAL 3 TIMES DAILY PRN
Qty: 30 TABLET | Refills: 2 | Status: SHIPPED | OUTPATIENT
Start: 2020-01-22 | End: 2020-07-16 | Stop reason: SDUPTHER
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
## 2020-01-27 ENCOUNTER — HOSPITAL ENCOUNTER (OUTPATIENT)
Dept: RADIOLOGY | Facility: HOSPITAL | Age: 60
Discharge: HOME | End: 2020-01-27
Attending: FAMILY MEDICINE
Payer: MEDICARE

## 2020-01-27 ENCOUNTER — TELEPHONE (OUTPATIENT)
Dept: FAMILY MEDICINE | Facility: CLINIC | Age: 60
End: 2020-01-27

## 2020-01-27 DIAGNOSIS — M25.562 ACUTE PAIN OF LEFT KNEE: ICD-10-CM

## 2020-01-27 DIAGNOSIS — S82.102A CLOSED FRACTURE OF PROXIMAL END OF LEFT TIBIA, UNSPECIFIED FRACTURE MORPHOLOGY, INITIAL ENCOUNTER: Primary | ICD-10-CM

## 2020-01-27 PROCEDURE — 73721 MRI JNT OF LWR EXTRE W/O DYE: CPT | Mod: 26,LT,, | Performed by: RADIOLOGY

## 2020-01-27 PROCEDURE — 73721 MRI KNEE WITHOUT CONTRAST LEFT: ICD-10-PCS | Mod: 26,LT,, | Performed by: RADIOLOGY

## 2020-01-27 PROCEDURE — 73721 MRI JNT OF LWR EXTRE W/O DYE: CPT | Mod: TC,PO,LT

## 2020-01-27 NOTE — TELEPHONE ENCOUNTER
----- Message from Maddy Garcia sent at 1/27/2020 11:39 AM CST -----  Contact: patient  Type: Needs Medical Advice    Who Called:  patient  Best Call Back Number: [phone] (home)   Additional Information: pt said please call about her labs today

## 2020-01-30 ENCOUNTER — PATIENT OUTREACH (OUTPATIENT)
Dept: ADMINISTRATIVE | Facility: OTHER | Age: 60
End: 2020-01-30

## 2020-02-03 ENCOUNTER — OFFICE VISIT (OUTPATIENT)
Dept: ORTHOPEDICS | Facility: CLINIC | Age: 60
End: 2020-02-03
Payer: MEDICARE

## 2020-02-03 VITALS — BODY MASS INDEX: 25.58 KG/M2 | HEIGHT: 66 IN | WEIGHT: 159.19 LBS

## 2020-02-03 DIAGNOSIS — F41.9 ANXIETY AND DEPRESSION: ICD-10-CM

## 2020-02-03 DIAGNOSIS — S82.142A CLOSED FRACTURE OF LEFT TIBIAL PLATEAU, INITIAL ENCOUNTER: Primary | ICD-10-CM

## 2020-02-03 DIAGNOSIS — F32.A ANXIETY AND DEPRESSION: ICD-10-CM

## 2020-02-03 DIAGNOSIS — F98.8 ATTENTION DEFICIT DISORDER, UNSPECIFIED HYPERACTIVITY PRESENCE: ICD-10-CM

## 2020-02-03 PROCEDURE — 99999 PR PBB SHADOW E&M-EST. PATIENT-LVL II: CPT | Mod: PBBFAC,,, | Performed by: ORTHOPAEDIC SURGERY

## 2020-02-03 PROCEDURE — 99204 PR OFFICE/OUTPT VISIT, NEW, LEVL IV, 45-59 MIN: ICD-10-PCS | Mod: 57,S$GLB,, | Performed by: ORTHOPAEDIC SURGERY

## 2020-02-03 PROCEDURE — 99999 PR PBB SHADOW E&M-EST. PATIENT-LVL II: ICD-10-PCS | Mod: PBBFAC,,, | Performed by: ORTHOPAEDIC SURGERY

## 2020-02-03 PROCEDURE — 3008F BODY MASS INDEX DOCD: CPT | Mod: CPTII,S$GLB,, | Performed by: ORTHOPAEDIC SURGERY

## 2020-02-03 PROCEDURE — 99204 OFFICE O/P NEW MOD 45 MIN: CPT | Mod: 57,S$GLB,, | Performed by: ORTHOPAEDIC SURGERY

## 2020-02-03 PROCEDURE — 27530 PR CLOSED RX TIBIAL PLATEAU FX: ICD-10-PCS | Mod: LT,S$GLB,, | Performed by: ORTHOPAEDIC SURGERY

## 2020-02-03 PROCEDURE — 3008F PR BODY MASS INDEX (BMI) DOCUMENTED: ICD-10-PCS | Mod: CPTII,S$GLB,, | Performed by: ORTHOPAEDIC SURGERY

## 2020-02-03 PROCEDURE — 27530 TREAT KNEE FRACTURE: CPT | Mod: LT,S$GLB,, | Performed by: ORTHOPAEDIC SURGERY

## 2020-02-03 NOTE — LETTER
February 3, 2020      Merrill Perez MD  [address]  [facility] [address]           [facility] [address] [place] [address]  Phone: [phone]          Patient: Vesta Tucker   MR Number: [id number]   YOB: 1960   Date of Visit: 2/3/2020       Dear Dr. Merrill Perez:    Thank you for referring Vesta Tucker to me for evaluation. Attached you will find relevant portions of my assessment and plan of care.    If you have questions, please do not hesitate to call me. I look forward to following Vesta Tucker along with you.    Sincerely,    Omar Del Valle MD    Enclosure  CC:  No Recipients    If you would like to receive this communication electronically, please contact [email or web address] or [phone] to request more information on [employer] access.    For providers and/or their staff who would like to refer a patient to [facility], please contact us through our one-stop-shop provider referral line, [facility], at [phone].    If you feel you have received this communication in error or would no longer like to receive these types of communications, please e-mail [email or web address]
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
## 2020-02-10 ENCOUNTER — TELEPHONE (OUTPATIENT)
Dept: FAMILY MEDICINE | Facility: CLINIC | Age: 60
End: 2020-02-10

## 2020-02-10 DIAGNOSIS — F98.8 ATTENTION DEFICIT DISORDER, UNSPECIFIED HYPERACTIVITY PRESENCE: ICD-10-CM

## 2020-02-10 NOTE — TELEPHONE ENCOUNTER
----- Message from Rachael  sent at 2/10/2020 12:27 PM CST -----  Contact: pt  Type:  Patient Returning Call    Who Called:  pt  Who Left Message for Patient:  Rachael but not sure  Does the patient know what this is regarding?:  yes  Best Call Back Number:    Additional Information:  Pt called back

## 2020-02-11 RX ORDER — ALBUTEROL SULFATE 90 UG/1
AEROSOL, METERED RESPIRATORY (INHALATION)
Qty: 18 G | Refills: 3 | Status: SHIPPED | OUTPATIENT
Start: 2020-02-11

## 2020-02-11 RX ORDER — DEXTROAMPHETAMINE SACCHARATE, AMPHETAMINE ASPARTATE MONOHYDRATE, DEXTROAMPHETAMINE SULFATE AND AMPHETAMINE SULFATE 5; 5; 5; 5 MG/1; MG/1; MG/1; MG/1
CAPSULE, EXTENDED RELEASE ORAL
Qty: 30 CAPSULE | Refills: 0 | Status: SHIPPED | OUTPATIENT
Start: 2020-02-11 | End: 2020-04-13

## 2020-02-28 DIAGNOSIS — S82.142A CLOSED FRACTURE OF LEFT TIBIAL PLATEAU, INITIAL ENCOUNTER: Primary | ICD-10-CM

## 2020-04-10 DIAGNOSIS — F98.8 ATTENTION DEFICIT DISORDER, UNSPECIFIED HYPERACTIVITY PRESENCE: ICD-10-CM

## 2020-04-13 RX ORDER — DEXTROAMPHETAMINE SACCHARATE, AMPHETAMINE ASPARTATE MONOHYDRATE, DEXTROAMPHETAMINE SULFATE AND AMPHETAMINE SULFATE 5; 5; 5; 5 MG/1; MG/1; MG/1; MG/1
CAPSULE, EXTENDED RELEASE ORAL
Qty: 30 CAPSULE | Refills: 0 | Status: SHIPPED | OUTPATIENT
Start: 2020-04-13 | End: 2020-08-24

## 2020-05-14 RX ORDER — HYDROCHLOROTHIAZIDE 12.5 MG/1
CAPSULE ORAL
Qty: 90 CAPSULE | Refills: 0 | Status: SHIPPED | OUTPATIENT
Start: 2020-05-14 | End: 2020-12-07 | Stop reason: SDUPTHER

## 2020-05-21 DIAGNOSIS — G89.21 CHRONIC PAIN DUE TO TRAUMA: ICD-10-CM

## 2020-05-21 DIAGNOSIS — M21.372 FOOT DROP, BILATERAL: ICD-10-CM

## 2020-05-21 DIAGNOSIS — M21.371 FOOT DROP, BILATERAL: ICD-10-CM

## 2020-05-21 RX ORDER — CYCLOBENZAPRINE HCL 10 MG
TABLET ORAL
Qty: 90 TABLET | Refills: 1 | Status: SHIPPED | OUTPATIENT
Start: 2020-05-21 | End: 2020-08-24 | Stop reason: SDUPTHER

## 2020-07-16 ENCOUNTER — TELEPHONE (OUTPATIENT)
Dept: FAMILY MEDICINE | Facility: CLINIC | Age: 60
End: 2020-07-16

## 2020-07-16 DIAGNOSIS — M25.562 ACUTE PAIN OF LEFT KNEE: ICD-10-CM

## 2020-07-16 RX ORDER — MESALAMINE 1.2 G/1
1.2 TABLET, DELAYED RELEASE ORAL
Qty: 90 TABLET | Refills: 1 | Status: CANCELLED | OUTPATIENT
Start: 2020-07-16

## 2020-07-16 NOTE — TELEPHONE ENCOUNTER
----- Message from Maria Teresa Reyes sent at 7/16/2020  4:27 PM CDT -----  Regarding: Refill (3) Meds  Type:  RX Refill Request    Who Called:  Patient  Refill or New Rx: Refill   RX Name and Strength:  ranitidine (ZANTAC) 150 MG tablet, mesalamine (APRISO) 0.375 gram Cp24 and ibuprofen (ADVIL,MOTRIN) 800 MG tablet  How is the patient currently taking it? (ex. 1XDay):  ....  Is this a 30 day or 90 day RX: 90 day   Preferred Pharmacy with phone number:    [facility] [place] - [address]  [address]  [facility] [address]  Phone: [phone] Fax: [phone]      Local or Mail Order:  local   Ordering Provider:  dg Wright Call Back Number:  [phone]

## 2020-07-17 RX ORDER — IBUPROFEN 800 MG/1
800 TABLET ORAL 3 TIMES DAILY PRN
Qty: 90 TABLET | Refills: 1 | Status: SHIPPED | OUTPATIENT
Start: 2020-07-17 | End: 2020-10-29 | Stop reason: SDUPTHER

## 2020-07-17 RX ORDER — FAMOTIDINE 40 MG/1
40 TABLET, FILM COATED ORAL 2 TIMES DAILY PRN
Qty: 180 TABLET | Refills: 3 | Status: SHIPPED | OUTPATIENT
Start: 2020-07-17 | End: 2022-02-18 | Stop reason: SDUPTHER

## 2020-08-10 ENCOUNTER — PATIENT OUTREACH (OUTPATIENT)
Dept: ADMINISTRATIVE | Facility: HOSPITAL | Age: 60
End: 2020-08-10

## 2020-08-10 DIAGNOSIS — Z12.31 ENCOUNTER FOR SCREENING MAMMOGRAM FOR BREAST CANCER: Primary | ICD-10-CM

## 2020-08-10 NOTE — PROGRESS NOTES
Health Maintenance Due   Topic Date Due    HIV Screening  1976    TETANUS VACCINE  1979    Pneumococcal Vaccine (Medium Risk) (1 of 1 - PPSV23) 1980    Shingles Vaccine (1 of 2) 2011    Cervical Cancer Screening  2018    Mammogram  2019       Chart review completed 08/10/2020.  Care Everywhere updates requested and reviewed.  Immunizations reconciled. Media reports reviewed.  Duplicate HM overrides and  orders removed.  Overdue HM topic chart audit and/or requested.  Overdue lab testing linked to upcoming lab appointments if applies.      Not in [facility] or [facility]  Not in DIS  [facility] orders placed mammogram

## 2020-08-24 ENCOUNTER — LAB VISIT (OUTPATIENT)
Dept: LAB | Facility: HOSPITAL | Age: 60
End: 2020-08-24
Attending: FAMILY MEDICINE
Payer: MEDICARE

## 2020-08-24 ENCOUNTER — OFFICE VISIT (OUTPATIENT)
Dept: FAMILY MEDICINE | Facility: CLINIC | Age: 60
End: 2020-08-24
Payer: MEDICARE

## 2020-08-24 DIAGNOSIS — Z79.899 HIGH RISK MEDICATIONS (NOT ANTICOAGULANTS) LONG-TERM USE: ICD-10-CM

## 2020-08-24 DIAGNOSIS — K21.9 GASTROESOPHAGEAL REFLUX DISEASE, ESOPHAGITIS PRESENCE NOT SPECIFIED: ICD-10-CM

## 2020-08-24 DIAGNOSIS — K51.011 ULCERATIVE PANCOLITIS WITH RECTAL BLEEDING: Primary | ICD-10-CM

## 2020-08-24 DIAGNOSIS — F98.8 ATTENTION DEFICIT DISORDER, UNSPECIFIED HYPERACTIVITY PRESENCE: ICD-10-CM

## 2020-08-24 DIAGNOSIS — K51.011 ULCERATIVE PANCOLITIS WITH RECTAL BLEEDING: ICD-10-CM

## 2020-08-24 DIAGNOSIS — Z00.00 ROUTINE GENERAL MEDICAL EXAMINATION AT A HEALTH CARE FACILITY: ICD-10-CM

## 2020-08-24 LAB
ALBUMIN SERPL BCP-MCNC: 3.4 G/DL (ref 3.5–5.2)
ALP SERPL-CCNC: 68 U/L (ref 55–135)
ALT SERPL W/O P-5'-P-CCNC: 29 U/L (ref 10–44)
ANION GAP SERPL CALC-SCNC: 6 MMOL/L (ref 8–16)
AST SERPL-CCNC: 27 U/L (ref 10–40)
BILIRUB SERPL-MCNC: 0.2 MG/DL (ref 0.1–1)
BUN SERPL-MCNC: 16 MG/DL (ref 6–20)
CALCIUM SERPL-MCNC: 8.7 MG/DL (ref 8.7–10.5)
CHLORIDE SERPL-SCNC: 107 MMOL/L (ref 95–110)
CHOLEST SERPL-MCNC: 177 MG/DL (ref 120–199)
CHOLEST/HDLC SERPL: 3.8 {RATIO} (ref 2–5)
CO2 SERPL-SCNC: 28 MMOL/L (ref 23–29)
CREAT SERPL-MCNC: 0.7 MG/DL (ref 0.5–1.4)
ERYTHROCYTE [DISTWIDTH] IN BLOOD BY AUTOMATED COUNT: 14.5 % (ref 11.5–14.5)
EST. GFR  (AFRICAN AMERICAN): >60 ML/MIN/1.73 M^2
EST. GFR  (NON AFRICAN AMERICAN): >60 ML/MIN/1.73 M^2
FERRITIN SERPL-MCNC: 29 NG/ML (ref 20–300)
GLUCOSE SERPL-MCNC: 98 MG/DL (ref 70–110)
HCT VFR BLD AUTO: 42.4 % (ref 37–48.5)
HDLC SERPL-MCNC: 46 MG/DL (ref 40–75)
HDLC SERPL: 26 % (ref 20–50)
HGB BLD-MCNC: 13 G/DL (ref 12–16)
IRON SERPL-MCNC: 50 UG/DL (ref 30–160)
LDLC SERPL CALC-MCNC: 93.2 MG/DL (ref 63–159)
MCH RBC QN AUTO: 29.1 PG (ref 27–31)
MCHC RBC AUTO-ENTMCNC: 30.7 G/DL (ref 32–36)
MCV RBC AUTO: 95 FL (ref 82–98)
NONHDLC SERPL-MCNC: 131 MG/DL
PLATELET # BLD AUTO: 368 K/UL (ref 150–350)
PMV BLD AUTO: 10.9 FL (ref 9.2–12.9)
POTASSIUM SERPL-SCNC: 4 MMOL/L (ref 3.5–5.1)
PROT SERPL-MCNC: 6.4 G/DL (ref 6–8.4)
RBC # BLD AUTO: 4.47 M/UL (ref 4–5.4)
SATURATED IRON: 12 % (ref 20–50)
SODIUM SERPL-SCNC: 141 MMOL/L (ref 136–145)
TOTAL IRON BINDING CAPACITY: 432 UG/DL (ref 250–450)
TRANSFERRIN SERPL-MCNC: 292 MG/DL (ref 200–375)
TRIGL SERPL-MCNC: 189 MG/DL (ref 30–150)
TSH SERPL DL<=0.005 MIU/L-ACNC: 1.5 UIU/ML (ref 0.4–4)
WBC # BLD AUTO: 7.48 K/UL (ref 3.9–12.7)

## 2020-08-24 PROCEDURE — 36415 COLL VENOUS BLD VENIPUNCTURE: CPT | Mod: PN

## 2020-08-24 PROCEDURE — 84443 ASSAY THYROID STIM HORMONE: CPT

## 2020-08-24 PROCEDURE — 99214 PR OFFICE/OUTPT VISIT, EST, LEVL IV, 30-39 MIN: ICD-10-PCS | Mod: 95,,, | Performed by: FAMILY MEDICINE

## 2020-08-24 PROCEDURE — 99499 UNLISTED E&M SERVICE: CPT | Mod: 95,,, | Performed by: FAMILY MEDICINE

## 2020-08-24 PROCEDURE — 99214 OFFICE O/P EST MOD 30 MIN: CPT | Mod: 95,,, | Performed by: FAMILY MEDICINE

## 2020-08-24 PROCEDURE — 82306 VITAMIN D 25 HYDROXY: CPT

## 2020-08-24 PROCEDURE — 85027 COMPLETE CBC AUTOMATED: CPT

## 2020-08-24 PROCEDURE — 99499 RISK ADDL DX/OHS AUDIT: ICD-10-PCS | Mod: 95,,, | Performed by: FAMILY MEDICINE

## 2020-08-24 PROCEDURE — 82728 ASSAY OF FERRITIN: CPT

## 2020-08-24 PROCEDURE — 83540 ASSAY OF IRON: CPT

## 2020-08-24 PROCEDURE — 82607 VITAMIN B-12: CPT

## 2020-08-24 PROCEDURE — 80061 LIPID PANEL: CPT

## 2020-08-24 PROCEDURE — 80053 COMPREHEN METABOLIC PANEL: CPT

## 2020-08-24 RX ORDER — DEXTROAMPHETAMINE SACCHARATE, AMPHETAMINE ASPARTATE, DEXTROAMPHETAMINE SULFATE AND AMPHETAMINE SULFATE 2.5; 2.5; 2.5; 2.5 MG/1; MG/1; MG/1; MG/1
10 TABLET ORAL 2 TIMES DAILY
Qty: 60 TABLET | Refills: 0 | Status: SHIPPED | OUTPATIENT
Start: 2020-10-24 | End: 2021-06-10

## 2020-08-24 RX ORDER — CYCLOBENZAPRINE HCL 10 MG
10 TABLET ORAL 3 TIMES DAILY PRN
Qty: 90 TABLET | Refills: 1 | Status: SHIPPED | OUTPATIENT
Start: 2020-08-24 | End: 2020-10-07

## 2020-08-24 RX ORDER — DEXTROAMPHETAMINE SACCHARATE, AMPHETAMINE ASPARTATE, DEXTROAMPHETAMINE SULFATE AND AMPHETAMINE SULFATE 2.5; 2.5; 2.5; 2.5 MG/1; MG/1; MG/1; MG/1
10 TABLET ORAL 2 TIMES DAILY
Qty: 60 TABLET | Refills: 0 | Status: SHIPPED | OUTPATIENT
Start: 2020-09-24 | End: 2021-06-16 | Stop reason: SDUPTHER

## 2020-08-24 RX ORDER — DEXTROAMPHETAMINE SACCHARATE, AMPHETAMINE ASPARTATE, DEXTROAMPHETAMINE SULFATE AND AMPHETAMINE SULFATE 2.5; 2.5; 2.5; 2.5 MG/1; MG/1; MG/1; MG/1
10 TABLET ORAL 2 TIMES DAILY
Qty: 60 TABLET | Refills: 0 | Status: SHIPPED | OUTPATIENT
Start: 2020-08-24 | End: 2021-06-16 | Stop reason: SDUPTHER

## 2020-08-24 NOTE — PROGRESS NOTES
Subjective:       Patient ID: Vesta Tucker is a 60 y.o. female.    Chief Complaint: No chief complaint on file.      The patient location is: home, [place]  The chief complaint leading to consultation is: f/u  Visit type: Virtual visit with synchronous audio and video  Total time spent with patient: 20mins  Each patient to whom he or she provides medical services by telemedicine is:  (1) informed of the relationship between the physician and patient and the respective role of any other health care provider with respect to management of the patient; and (2) notified that he or she may decline to receive medical services by telemedicine and may withdraw from such care at any time.    Notes:   Patient seen for f/u.  Since her LOV with GI last ebenezer, she had her mesalamine dose increased, but still has blood with each BM. Both darker and brighter blood visible.   +gerd   Hoping for an alternative to mesalamine.   She's been taking her ibu 800 daily for stomach discomfort. Discussed issues with consistent use of nsaids given her UC.     Review of Systems   Constitutional: Negative for fever.   Gastrointestinal: Positive for abdominal pain, blood in stool and diarrhea. Negative for constipation, nausea and vomiting.   Genitourinary: Negative for dysuria, frequency and hematuria.   Musculoskeletal: Positive for arthralgias and myalgias (leg cramps).   Neurological: Negative for headaches.       Objective:        Physical Exam  Vitals signs and nursing note reviewed.   Constitutional:       General: She is not in acute distress.     Appearance: She is well-developed.   HENT:      Head: Normocephalic and atraumatic.   Eyes:      General: No scleral icterus.        Right eye: No discharge.         Left eye: No discharge.      Conjunctiva/sclera: Conjunctivae normal.   Pulmonary:      Effort: Pulmonary effort is normal. No respiratory distress.   Skin:     General: Skin is warm and dry.   Neurological:      Mental Status: She is  alert and oriented to person, place, and time.   Psychiatric:         Behavior: Behavior normal.             Assessment:   Ulcerative pancolitis with rectal bleeding  Comments:  persistent bleeding despite regimen adj per gi several months ago, instructed pt to notify gi for follow-up appt  Orders:  -     CBC Without Differential; Future; Expected date: 08/24/2020  -     Comprehensive metabolic panel; Future; Expected date: 08/24/2020  -     Iron and TIBC; Future; Expected date: 08/24/2020  -     Ferritin; Future; Expected date: 08/24/2020  -     Lipid Panel; Future; Expected date: 08/24/2020  -     TSH; Future; Expected date: 08/24/2020  -     Urinalysis; Future  -     Vitamin B12; Future; Expected date: 08/24/2020  -     Vitamin D; Future; Expected date: 08/24/2020    Routine general medical examination at a health care facility  -     cyclobenzaprine (FLEXERIL) 10 MG tablet; Take 1 tablet (10 mg total) by mouth 3 (three) times daily as needed.  Dispense: 90 tablet; Refill: 1    Gastroesophageal reflux disease, esophagitis presence not specified  -     CBC Without Differential; Future; Expected date: 08/24/2020  -     Comprehensive metabolic panel; Future; Expected date: 08/24/2020  -     Iron and TIBC; Future; Expected date: 08/24/2020  -     Ferritin; Future; Expected date: 08/24/2020  -     Lipid Panel; Future; Expected date: 08/24/2020  -     TSH; Future; Expected date: 08/24/2020  -     Urinalysis; Future  -     Vitamin B12; Future; Expected date: 08/24/2020  -     Vitamin D; Future; Expected date: 08/24/2020    High risk medications (not anticoagulants) long-term use  -     CBC Without Differential; Future; Expected date: 08/24/2020  -     Comprehensive metabolic panel; Future; Expected date: 08/24/2020  -     Iron and TIBC; Future; Expected date: 08/24/2020  -     Ferritin; Future; Expected date: 08/24/2020  -     Lipid Panel; Future; Expected date: 08/24/2020  -     TSH; Future; Expected date:  08/24/2020  -     Urinalysis; Future  -     Vitamin B12; Future; Expected date: 08/24/2020  -     Vitamin D; Future; Expected date: 08/24/2020    Attention deficit disorder, unspecified hyperactivity presence  -     dextroamphetamine-amphetamine 10 mg Tab; Take 1 tablet (10 mg total) by mouth 2 (two) times a day.  Dispense: 60 tablet; Refill: 0  -     dextroamphetamine-amphetamine 10 mg Tab; Take 1 tablet (10 mg total) by mouth 2 (two) times a day.  Dispense: 60 tablet; Refill: 0  -     dextroamphetamine-amphetamine 10 mg Tab; Take 1 tablet (10 mg total) by mouth 2 (two) times a day.  Dispense: 60 tablet; Refill: 0    3 months of rx done. Medication compliance, sx monitoring, prescription responsibility and non-diversion reviewed. Patient aware that I do not provide reprints for lost rx. Had no questions or concerns.      Patient aware of limitations to assessment and exam of telemedicine. Deemed appropriate at this time given current covid-19 concerns.     Answers for HPI/ROS submitted by the patient on 8/24/2020   Abdominal pain  Chronicity: chronic  Onset: more than 1 month ago  Onset quality: gradual  Frequency: 2 to 4 times per day  Episode duration: 1 hours  Progression since onset: gradually worsening  Pain location: generalized abdominal region  Pain - numeric: 7/10  Pain quality: cramping  anorexia: No  belching: Yes  flatus: Yes  hematochezia: Yes  melena: No  weight loss: No  Aggravated by: bowel movement, eating  Relieved by: nothing  Pain severity: moderate  Treatments tried: antacids, oral narcotic analgesics  Improvement on treatment: mild  kidney stones: Yes  PUD: Yes  ulcerative colitis: Yes  UTI: Yes

## 2020-08-25 ENCOUNTER — LAB VISIT (OUTPATIENT)
Dept: LAB | Facility: HOSPITAL | Age: 60
End: 2020-08-25
Attending: FAMILY MEDICINE
Payer: MEDICARE

## 2020-08-25 ENCOUNTER — TELEPHONE (OUTPATIENT)
Dept: FAMILY MEDICINE | Facility: CLINIC | Age: 60
End: 2020-08-25

## 2020-08-25 DIAGNOSIS — K51.011 ULCERATIVE PANCOLITIS WITH RECTAL BLEEDING: Primary | ICD-10-CM

## 2020-08-25 DIAGNOSIS — K51.011 ULCERATIVE PANCOLITIS WITH RECTAL BLEEDING: ICD-10-CM

## 2020-08-25 DIAGNOSIS — Z79.899 HIGH RISK MEDICATIONS (NOT ANTICOAGULANTS) LONG-TERM USE: ICD-10-CM

## 2020-08-25 DIAGNOSIS — K21.9 GASTROESOPHAGEAL REFLUX DISEASE, ESOPHAGITIS PRESENCE NOT SPECIFIED: ICD-10-CM

## 2020-08-25 LAB
25(OH)D3+25(OH)D2 SERPL-MCNC: 14 NG/ML (ref 30–96)
BILIRUB UR QL STRIP: NEGATIVE
CLARITY UR REFRACT.AUTO: CLEAR
COLOR UR AUTO: YELLOW
GLUCOSE UR QL STRIP: NEGATIVE
HGB UR QL STRIP: NEGATIVE
KETONES UR QL STRIP: NEGATIVE
LEUKOCYTE ESTERASE UR QL STRIP: NEGATIVE
NITRITE UR QL STRIP: NEGATIVE
PH UR STRIP: 5 [PH] (ref 5–8)
PROT UR QL STRIP: NEGATIVE
SP GR UR STRIP: 1.02 (ref 1–1.03)
URN SPEC COLLECT METH UR: NORMAL
VIT B12 SERPL-MCNC: 342 PG/ML (ref 210–950)

## 2020-08-25 PROCEDURE — 81003 URINALYSIS AUTO W/O SCOPE: CPT

## 2020-08-25 NOTE — TELEPHONE ENCOUNTER
Pt's urinalysis was set up for [facility] and she dropped it off here today. Order needs to be resubmitted and booked. Done.

## 2020-08-25 NOTE — TELEPHONE ENCOUNTER
----- Message from Shaina Rasmussen MD sent at 8/25/2020  5:12 PM CDT -----  Regarding: FW: advice  Contact: pt    ----- Message -----  From: Rachael Juvencio  Sent: 8/25/2020   2:12 PM CDT  To: Shaina Rasmussen MD  Subject: advice                                           Type: Needs Medical Advice    Who Called:      Best Call Back Number:     Additional Information: Requesting a call back from Nurse, regarding pt needs to discuss some meds that she was given and can not take and needs something else called in  ,please call back with advice

## 2020-08-28 RX ORDER — PROMETHAZINE HYDROCHLORIDE 25 MG/1
25 TABLET ORAL NIGHTLY
Qty: 30 TABLET | Refills: 1 | Status: SHIPPED | OUTPATIENT
Start: 2020-08-28 | End: 2022-09-27

## 2020-08-28 NOTE — TELEPHONE ENCOUNTER
Rec'd fax from [facility] pt requesting refill for promethazine 25mg. Please review and advise.     LOV 8/24/20  Last refill 2018

## 2020-10-03 DIAGNOSIS — Z00.00 ROUTINE GENERAL MEDICAL EXAMINATION AT A HEALTH CARE FACILITY: ICD-10-CM

## 2020-10-05 ENCOUNTER — PATIENT MESSAGE (OUTPATIENT)
Dept: ADMINISTRATIVE | Facility: HOSPITAL | Age: 60
End: 2020-10-05

## 2020-10-05 ENCOUNTER — TELEPHONE (OUTPATIENT)
Dept: GASTROENTEROLOGY | Facility: CLINIC | Age: 60
End: 2020-10-05

## 2020-10-05 ENCOUNTER — PATIENT OUTREACH (OUTPATIENT)
Dept: ADMINISTRATIVE | Facility: OTHER | Age: 60
End: 2020-10-05

## 2020-10-05 DIAGNOSIS — Z12.31 ENCOUNTER FOR SCREENING MAMMOGRAM FOR MALIGNANT NEOPLASM OF BREAST: Primary | ICD-10-CM

## 2020-10-05 NOTE — PROGRESS NOTES
Health Maintenance Due   Topic Date Due    HIV Screening  02/26/1976    TETANUS VACCINE  02/26/1979    Pneumococcal Vaccine (Medium Risk) (1 of 1 - PPSV23) 02/26/1980    Shingles Vaccine (1 of 2) 02/26/2011    Cervical Cancer Screening  05/07/2018    Mammogram  05/22/2019    Influenza Vaccine (1) 08/01/2020     Updates were requested from care everywhere.  Chart was reviewed for overdue Proactive [facility] Encounters (STEFANO) topics (CRS, Breast Cancer Screening, Eye exam)  Health Maintenance has been updated.  LINKS immunization registry triggered.  Immunizations were not able to be reconciled. Patient not found in LINKS.  Previously ordered mammogram tasked to patient.

## 2020-10-05 NOTE — TELEPHONE ENCOUNTER
Attempted to contact pt regarding appt on 10/6. Left message for pt to contact clinic. Call back number provided.

## 2020-10-07 RX ORDER — CYCLOBENZAPRINE HCL 10 MG
10 TABLET ORAL 3 TIMES DAILY PRN
Qty: 90 TABLET | Refills: 3 | Status: SHIPPED | OUTPATIENT
Start: 2020-10-07 | End: 2021-05-24

## 2020-10-13 ENCOUNTER — LAB VISIT (OUTPATIENT)
Dept: LAB | Facility: HOSPITAL | Age: 60
End: 2020-10-13
Attending: INTERNAL MEDICINE
Payer: MEDICARE

## 2020-10-13 ENCOUNTER — OFFICE VISIT (OUTPATIENT)
Dept: GASTROENTEROLOGY | Facility: CLINIC | Age: 60
End: 2020-10-13
Payer: MEDICARE

## 2020-10-13 VITALS — HEIGHT: 66 IN | RESPIRATION RATE: 18 BRPM | WEIGHT: 159.19 LBS | BODY MASS INDEX: 25.58 KG/M2

## 2020-10-13 DIAGNOSIS — R19.7 DIARRHEA, UNSPECIFIED TYPE: ICD-10-CM

## 2020-10-13 DIAGNOSIS — Z01.812 PRE-PROCEDURE LAB EXAM: ICD-10-CM

## 2020-10-13 DIAGNOSIS — R19.7 DIARRHEA, UNSPECIFIED TYPE: Primary | ICD-10-CM

## 2020-10-13 DIAGNOSIS — K51.011 ULCERATIVE PANCOLITIS WITH RECTAL BLEEDING: ICD-10-CM

## 2020-10-13 LAB — ERYTHROCYTE [SEDIMENTATION RATE] IN BLOOD BY WESTERGREN METHOD: 7 MM/HR (ref 0–20)

## 2020-10-13 PROCEDURE — 99999 PR PBB SHADOW E&M-EST. PATIENT-LVL III: ICD-10-PCS | Mod: PBBFAC,,, | Performed by: INTERNAL MEDICINE

## 2020-10-13 PROCEDURE — 99214 OFFICE O/P EST MOD 30 MIN: CPT | Mod: S$GLB,,, | Performed by: INTERNAL MEDICINE

## 2020-10-13 PROCEDURE — 3008F PR BODY MASS INDEX (BMI) DOCUMENTED: ICD-10-PCS | Mod: CPTII,S$GLB,, | Performed by: INTERNAL MEDICINE

## 2020-10-13 PROCEDURE — 3008F BODY MASS INDEX DOCD: CPT | Mod: CPTII,S$GLB,, | Performed by: INTERNAL MEDICINE

## 2020-10-13 PROCEDURE — 36415 COLL VENOUS BLD VENIPUNCTURE: CPT | Mod: PO

## 2020-10-13 PROCEDURE — 85651 RBC SED RATE NONAUTOMATED: CPT | Mod: PO

## 2020-10-13 PROCEDURE — 99999 PR PBB SHADOW E&M-EST. PATIENT-LVL III: CPT | Mod: PBBFAC,,, | Performed by: INTERNAL MEDICINE

## 2020-10-13 PROCEDURE — 99214 PR OFFICE/OUTPT VISIT, EST, LEVL IV, 30-39 MIN: ICD-10-PCS | Mod: S$GLB,,, | Performed by: INTERNAL MEDICINE

## 2020-10-13 RX ORDER — BUDESONIDE 3 MG/1
9 CAPSULE, COATED PELLETS ORAL DAILY
Qty: 90 CAPSULE | Refills: 1 | Status: SHIPPED | OUTPATIENT
Start: 2020-10-13 | End: 2020-10-23

## 2020-10-13 NOTE — PROGRESS NOTES
"Pt presents for evaluation of colitis. Pt notes onset of "flare" symptoms starting in Feb of this year. Intermittent abd cramps, diarrhea with bloody stool. No vomiting. No constipation. No fever or jaundice. Tolerating diet. Weight stable. Taking Apriso daily. C-scope 2018 pan-colitis (normal terminal ileum). Pt states symptoms improved over past month, however not resolved. Recent H/H normal. Pt did not seek medical care sooner secondary to COVID fears.     REVIEW OF SYSTEMS:   Constitutional: Negative for fever, appetite change and unexpected weight change.  HENT: Negative for sore throat and trouble swallowing.  Eyes: Negative for visual disturbance.  Respiratory: Negative for chest tightness, shortness of breath and wheezing.  Cardiovascular: Negative for chest pain.  Gastrointestinal:  as per HPI  Genitourinary: Negative for dysuria, frequency and hematuria.    PHYSICAL EXAMINATION:                                                        GENERAL:  Comfortable, in no acute distress.      SKIN: Non-jaundiced.                             HEENT EXAM:  Nonicteric.  No adenopathy.  Oropharynx is clear.               NECK:  Supple.                                                               LUNGS:  Clear.                                                               CARDIAC:  Regular rate and rhythm.  S1, S2.  No murmur.                      ABDOMEN:  Soft, positive bowel sounds, positive tenderness to palpation diffusely.  No hepatosplenomegaly or masses.  No rebound or guarding.                                             EXTREMITIES:  No edema.     NEURO: CN II-XII intact; motor/sensory non-focal.    IMP: U.C. flare     PLAN: 1. Stool evaluation, including C diff and calprotectin.             2. Sed rate             3. C-scope             4. Decision re: biologics following above.    "

## 2020-10-15 ENCOUNTER — LAB VISIT (OUTPATIENT)
Dept: LAB | Facility: HOSPITAL | Age: 60
End: 2020-10-15
Attending: INTERNAL MEDICINE
Payer: MEDICARE

## 2020-10-15 DIAGNOSIS — R19.7 DIARRHEA, UNSPECIFIED TYPE: ICD-10-CM

## 2020-10-15 LAB
C DIFF GDH STL QL: NEGATIVE
C DIFF TOX A+B STL QL IA: NEGATIVE

## 2020-10-15 PROCEDURE — 87427 SHIGA-LIKE TOXIN AG IA: CPT | Mod: 59

## 2020-10-15 PROCEDURE — 87045 FECES CULTURE AEROBIC BACT: CPT

## 2020-10-15 PROCEDURE — 87046 STOOL CULTR AEROBIC BACT EA: CPT | Mod: 59

## 2020-10-15 PROCEDURE — 87324 CLOSTRIDIUM AG IA: CPT

## 2020-10-15 PROCEDURE — 87449 NOS EACH ORGANISM AG IA: CPT

## 2020-10-15 PROCEDURE — 87329 GIARDIA AG IA: CPT

## 2020-10-15 PROCEDURE — 87209 SMEAR COMPLEX STAIN: CPT

## 2020-10-15 PROCEDURE — 83993 ASSAY FOR CALPROTECTIN FECAL: CPT

## 2020-10-16 LAB
CRYPTOSP AG STL QL IA: NEGATIVE
G LAMBLIA AG STL QL IA: NEGATIVE

## 2020-10-19 LAB
E COLI SXT1 STL QL IA: NEGATIVE
E COLI SXT2 STL QL IA: NEGATIVE
O+P STL MICRO: NORMAL

## 2020-10-20 LAB — BACTERIA STL CULT: NORMAL

## 2020-10-22 LAB — CALPROTECTIN STL-MCNT: ABNORMAL MCG/G

## 2020-10-23 ENCOUNTER — LAB VISIT (OUTPATIENT)
Dept: FAMILY MEDICINE | Facility: CLINIC | Age: 60
End: 2020-10-23
Payer: MEDICARE

## 2020-10-23 ENCOUNTER — TELEPHONE (OUTPATIENT)
Dept: GASTROENTEROLOGY | Facility: CLINIC | Age: 60
End: 2020-10-23

## 2020-10-23 DIAGNOSIS — Z01.812 PRE-PROCEDURE LAB EXAM: ICD-10-CM

## 2020-10-23 PROCEDURE — U0003 INFECTIOUS AGENT DETECTION BY NUCLEIC ACID (DNA OR RNA); SEVERE ACUTE RESPIRATORY SYNDROME CORONAVIRUS 2 (SARS-COV-2) (CORONAVIRUS DISEASE [COVID-19]), AMPLIFIED PROBE TECHNIQUE, MAKING USE OF HIGH THROUGHPUT TECHNOLOGIES AS DESCRIBED BY CMS-2020-01-R: HCPCS

## 2020-10-23 NOTE — TELEPHONE ENCOUNTER
----- Message from Fab Clark MD sent at 10/23/2020  8:26 AM CDT -----  Tell pt stool negative for infection, calprotectin elevated consistent with active inflammation/colitis.  F/U C-scope as scheduled.

## 2020-10-25 LAB — SARS-COV-2 RNA RESP QL NAA+PROBE: NOT DETECTED

## 2020-10-26 ENCOUNTER — HOSPITAL ENCOUNTER (OUTPATIENT)
Facility: HOSPITAL | Age: 60
Discharge: HOME | End: 2020-10-26
Attending: INTERNAL MEDICINE | Admitting: INTERNAL MEDICINE
Payer: MEDICARE

## 2020-10-26 ENCOUNTER — ANESTHESIA EVENT (OUTPATIENT)
Dept: ENDOSCOPY | Facility: HOSPITAL | Age: 60
End: 2020-10-26
Payer: MEDICARE

## 2020-10-26 ENCOUNTER — ANESTHESIA (OUTPATIENT)
Dept: ENDOSCOPY | Facility: HOSPITAL | Age: 60
End: 2020-10-26
Payer: MEDICARE

## 2020-10-26 VITALS
RESPIRATION RATE: 16 BRPM | BODY MASS INDEX: 24.91 KG/M2 | WEIGHT: 155 LBS | TEMPERATURE: 98 F | DIASTOLIC BLOOD PRESSURE: 65 MMHG | SYSTOLIC BLOOD PRESSURE: 116 MMHG | HEART RATE: 78 BPM | OXYGEN SATURATION: 95 % | HEIGHT: 66 IN

## 2020-10-26 DIAGNOSIS — K62.5 RECTAL BLEED: ICD-10-CM

## 2020-10-26 PROCEDURE — 27201012 HC FORCEPS, HOT/COLD, DISP: Mod: PO | Performed by: INTERNAL MEDICINE

## 2020-10-26 PROCEDURE — 25000003 PHARM REV CODE 250: Mod: PO | Performed by: NURSE ANESTHETIST, CERTIFIED REGISTERED

## 2020-10-26 PROCEDURE — 25000003 PHARM REV CODE 250: Mod: PO | Performed by: INTERNAL MEDICINE

## 2020-10-26 PROCEDURE — 45380 COLONOSCOPY AND BIOPSY: CPT | Mod: PO | Performed by: INTERNAL MEDICINE

## 2020-10-26 PROCEDURE — 63600175 PHARM REV CODE 636 W HCPCS: Mod: PO | Performed by: INTERNAL MEDICINE

## 2020-10-26 PROCEDURE — D9220A PRA ANESTHESIA: Mod: ANES,,, | Performed by: ANESTHESIOLOGY

## 2020-10-26 PROCEDURE — D9220A PRA ANESTHESIA: ICD-10-PCS | Mod: CRNA,,, | Performed by: NURSE ANESTHETIST, CERTIFIED REGISTERED

## 2020-10-26 PROCEDURE — 45380 PR COLONOSCOPY,BIOPSY: ICD-10-PCS | Mod: ,,, | Performed by: INTERNAL MEDICINE

## 2020-10-26 PROCEDURE — 45380 COLONOSCOPY AND BIOPSY: CPT | Mod: ,,, | Performed by: INTERNAL MEDICINE

## 2020-10-26 PROCEDURE — D9220A PRA ANESTHESIA: Mod: CRNA,,, | Performed by: NURSE ANESTHETIST, CERTIFIED REGISTERED

## 2020-10-26 PROCEDURE — 88305 TISSUE EXAM BY PATHOLOGIST: CPT | Mod: 26,,, | Performed by: PATHOLOGY

## 2020-10-26 PROCEDURE — 88305 TISSUE EXAM BY PATHOLOGIST: ICD-10-PCS | Mod: 26,,, | Performed by: PATHOLOGY

## 2020-10-26 PROCEDURE — 37000009 HC ANESTHESIA EA ADD 15 MINS: Mod: PO | Performed by: INTERNAL MEDICINE

## 2020-10-26 PROCEDURE — D9220A PRA ANESTHESIA: ICD-10-PCS | Mod: ANES,,, | Performed by: ANESTHESIOLOGY

## 2020-10-26 PROCEDURE — 63600175 PHARM REV CODE 636 W HCPCS: Mod: PO | Performed by: NURSE ANESTHETIST, CERTIFIED REGISTERED

## 2020-10-26 PROCEDURE — 88305 TISSUE EXAM BY PATHOLOGIST: CPT | Mod: 59 | Performed by: PATHOLOGY

## 2020-10-26 PROCEDURE — 37000008 HC ANESTHESIA 1ST 15 MINUTES: Mod: PO | Performed by: INTERNAL MEDICINE

## 2020-10-26 RX ORDER — LIDOCAINE HCL/PF 100 MG/5ML
SYRINGE (ML) INTRAVENOUS
Status: DISCONTINUED | OUTPATIENT
Start: 2020-10-26 | End: 2020-10-26

## 2020-10-26 RX ORDER — SODIUM CHLORIDE 0.9 % (FLUSH) 0.9 %
10 SYRINGE (ML) INJECTION
Status: DISCONTINUED | OUTPATIENT
Start: 2020-10-26 | End: 2020-10-26 | Stop reason: HOSPADM

## 2020-10-26 RX ORDER — SODIUM CHLORIDE, SODIUM LACTATE, POTASSIUM CHLORIDE, CALCIUM CHLORIDE 600; 310; 30; 20 MG/100ML; MG/100ML; MG/100ML; MG/100ML
INJECTION, SOLUTION INTRAVENOUS CONTINUOUS
Status: DISCONTINUED | OUTPATIENT
Start: 2020-10-26 | End: 2020-10-26 | Stop reason: HOSPADM

## 2020-10-26 RX ORDER — PROPOFOL 10 MG/ML
VIAL (ML) INTRAVENOUS
Status: DISCONTINUED | OUTPATIENT
Start: 2020-10-26 | End: 2020-10-26

## 2020-10-26 RX ORDER — LIDOCAINE HYDROCHLORIDE 10 MG/ML
1 INJECTION INFILTRATION; PERINEURAL ONCE
Status: COMPLETED | OUTPATIENT
Start: 2020-10-26 | End: 2020-10-26

## 2020-10-26 RX ADMIN — PROPOFOL 50 MG: 10 INJECTION, EMULSION INTRAVENOUS at 08:10

## 2020-10-26 RX ADMIN — LIDOCAINE HYDROCHLORIDE 1 ML: 10 INJECTION, SOLUTION EPIDURAL; INFILTRATION; INTRACAUDAL; PERINEURAL at 07:10

## 2020-10-26 RX ADMIN — PROPOFOL 25 MG: 10 INJECTION, EMULSION INTRAVENOUS at 08:10

## 2020-10-26 RX ADMIN — LIDOCAINE HYDROCHLORIDE 100 MG: 20 INJECTION, SOLUTION INTRAVENOUS at 07:10

## 2020-10-26 RX ADMIN — SODIUM CHLORIDE, SODIUM LACTATE, POTASSIUM CHLORIDE, AND CALCIUM CHLORIDE: .6; .31; .03; .02 INJECTION, SOLUTION INTRAVENOUS at 07:10

## 2020-10-26 RX ADMIN — PROPOFOL 100 MG: 10 INJECTION, EMULSION INTRAVENOUS at 07:10

## 2020-10-26 NOTE — TRANSFER OF CARE
"Anesthesia Transfer of Care Note    Patient: Vesta Tucker    Procedure(s) Performed: Procedure(s) (LRB):  COLONOSCOPY (N/A)    Patient location: PACU    Anesthesia Type: general    Transport from OR: Transported from OR on room air with adequate spontaneous ventilation    Post pain: adequate analgesia    Post assessment: no apparent anesthetic complications and tolerated procedure well    Post vital signs: stable    Level of consciousness: sedated and responds to stimulation    Nausea/Vomiting: no nausea/vomiting    Complications: none    Transfer of care protocol was followed      Last vitals:   Visit Vitals  BP (!) 106/56   Pulse (!) 57   Temp 36.8 °C (98.2 °F) (Skin)   Resp 18   Ht 5' 6" (1.676 m)   Wt 70.3 kg (155 lb)   LMP 08/06/2011   SpO2 100%   Breastfeeding No   BMI 25.02 kg/m²     "

## 2020-10-26 NOTE — [employer] PATIENT INSTRUCTIONS
Discharge Summary/Instructions after an Endoscopic Procedure  Patient Name: Vesta Tucker  Patient MRN: [id number]  Patient YOB: 1960 Monday, October 26, 2020  Fab Clark MD  RESTRICTIONS:  During your procedure today, you received medications for sedation.  These   medications may affect your judgment, balance and coordination.  Therefore,   for 24 hours, you have the following restrictions:   - DO NOT drive a car, operate machinery, make legal/financial decisions,   sign important papers or drink alcohol.    ACTIVITY:  Today: no heavy lifting, straining or running due to procedural   sedation/anesthesia.  The following day: return to full activity including work.  DIET:  Eat and drink normally unless instructed otherwise.     TREATMENT FOR COMMON SIDE EFFECTS:  - Mild abdominal pain, nausea, belching, bloating or excessive gas:  rest,   eat lightly and use a heating pad.  - Sore Throat: treat with throat lozenges and/or gargle with warm salt   water.  - Because air was used during the procedure, expelling large amounts of air   from your rectum or belching is normal.  - If a bowel prep was taken, you may not have a bowel movement for 1-3 days.    This is normal.  SYMPTOMS TO WATCH FOR AND REPORT TO YOUR PHYSICIAN:  1. Abdominal pain or bloating, other than gas cramps.  2. Chest pain.  3. Back pain.  4. Signs of infection such as: chills or fever occurring within 24 hours   after the procedure.  5. Rectal bleeding, which would show as bright red, maroon, or black stools.   (A tablespoon of blood from the rectum is not serious, especially if   hemorrhoids are present.)  6. Vomiting.  7. Weakness or dizziness.  GO DIRECTLY TO THE NEAREST EMERGENCY ROOM IF YOU HAVE ANY OF THE FOLLOWING:      Difficulty breathing              Chills and/or fever over 101 F   Persistent vomiting and/or vomiting blood   Severe abdominal pain   Severe chest pain   Black, tarry stools   Bleeding- more than one  tablespoon   Any other symptom or condition that you feel may need urgent attention  Your doctor recommends these additional instructions:  If any biopsies were taken, your doctors clinic will contact you in 1 to 2   weeks with any results.  We are waiting for your pathology results.   Your physician has recommended a repeat colonoscopy in three years for   surveillance based on pathology results.   You are being discharged to home.  For questions, problems or results please call your physician - Fab Clark MD at Work:  [phone].  EMERGENCY PHONE NUMBER: [phone], LAB RESULTS: [phone]  IF A COMPLICATION OR EMERGENCY SITUATION ARISES AND YOU ARE UNABLE TO REACH   YOUR PHYSICIAN - GO DIRECTLY TO THE EMERGENCY ROOM.  ___________________________________________  Nurse Signature  ___________________________________________  Patient/Designated Responsible Party Signature  Fab Clark MD  10/26/2020 8:28:54 AM  This report has been verified and signed electronically.  [employer]

## 2020-10-26 NOTE — DISCHARGE SUMMARY
Discharge Note  Short Stay      SUMMARY     Admit Date: 10/26/2020    Attending Physician: Fab Clark MD     Discharge Physician: Fab Clark MD    Discharge Date: 10/26/2020 8:29 AM    Final Diagnosis: Rectal bleeding [K62.5]  Diarrhea, unspecified type [R19.7]  History of ulcerative colitis [Z87.19]    Disposition: HOME OR SELF CARE    Patient Instructions:   Current Discharge Medication List      CONTINUE these medications which have NOT CHANGED    Details   acetaminophen (TYLENOL) 325 MG tablet Take 325 mg by mouth every 6 (six) hours as needed for Pain.      budesonide-formoterol 80-4.5 mcg (SYMBICORT) 80-4.5 mcg/actuation HFAA Inhale 2 puffs into the lungs 2 (two) times daily. Controller  Qty: 3 Inhaler, Refills: 1      ciclopirox (PENLAC) 8 % Soln Apply topically nightly. Apply to adjacent skin and affected nails daily. Remove with alcohol every 7 days.  Qty: 6.6 mL, Refills: 11    Associated Diagnoses: Onychomycosis      cyclobenzaprine (FLEXERIL) 10 MG tablet TAKE 1 TABLET (10 MG TOTAL) BY MOUTH 3 (THREE) TIMES DAILY AS NEEDED.  Qty: 90 tablet, Refills: 3    Associated Diagnoses: Routine general medical examination at a health care facility      !! dextroamphetamine-amphetamine 10 mg Tab Take 1 tablet (10 mg total) by mouth 2 (two) times a day.  Qty: 60 tablet, Refills: 0    Associated Diagnoses: Attention deficit disorder, unspecified hyperactivity presence      !! dextroamphetamine-amphetamine 10 mg Tab Take 1 tablet (10 mg total) by mouth 2 (two) times a day.  Qty: 60 tablet, Refills: 0    Associated Diagnoses: Attention deficit disorder, unspecified hyperactivity presence      !! dextroamphetamine-amphetamine 10 mg Tab Take 1 tablet (10 mg total) by mouth 2 (two) times a day.  Qty: 60 tablet, Refills: 0    Associated Diagnoses: Attention deficit disorder, unspecified hyperactivity presence      famotidine (PEPCID) 40 MG tablet Take 1 tablet (40 mg total) by mouth 2 (two) times daily as  needed for Heartburn.  Qty: 180 tablet, Refills: 3      hydroCHLOROthiazide (MICROZIDE) 12.5 mg capsule TAKE 1 CAPSULE BY MOUTH ONCE EVERY DAY  Qty: 90 capsule, Refills: 0      ibuprofen (ADVIL,MOTRIN) 800 MG tablet Take 1 tablet (800 mg total) by mouth 3 (three) times daily as needed for Pain.  Qty: 90 tablet, Refills: 1    Associated Diagnoses: Acute pain of left knee      melatonin 5 mg Cap Take 1 tablet by mouth nightly as needed.       mesalamine (APRISO) 0.375 gram Cp24 TAKE 4 CAPSULES BY MOUTH ONCE EVERY DAY  Qty: 120 capsule, Refills: 2      VENTOLIN HFA 90 mcg/actuation inhaler INHALE 1-2 PUFFS INTO THE LUNGS EVERY 6 (SIX) HOURS AS NEEDED FOR WHEEZING (RESCUE)  Qty: 18 g, Refills: 3      promethazine (PHENERGAN) 25 MG tablet Take 1 tablet (25 mg total) by mouth every evening.  Qty: 30 tablet, Refills: 1       !! - Potential duplicate medications found. Please discuss with provider.          Discharge Procedure Orders (must include Diet, Follow-up, Activity)    Follow Up:  Follow up with PCP as previously scheduled  Resume routine diet.  Activity as tolerated.    No driving day of procedure.

## 2020-10-26 NOTE — ANESTHESIA POSTPROCEDURE EVALUATION
Anesthesia Post Evaluation    Patient: Vesta Tucker    Procedure(s) Performed: Procedure(s) (LRB):  COLONOSCOPY (N/A)    Final Anesthesia Type: general    Patient location during evaluation: PACU  Patient participation: Yes- Able to Participate  Level of consciousness: awake and alert, oriented and awake  Post-procedure vital signs: reviewed and stable  Pain management: adequate  Airway patency: patent    PONV status at discharge: No PONV  Anesthetic complications: no      Cardiovascular status: blood pressure returned to baseline and hemodynamically stable  Respiratory status: unassisted, spontaneous ventilation and room air  Hydration status: euvolemic  Follow-up not needed.          Vitals Value Taken Time   /65 10/26/20 0846   Temp 36.5 °C (97.7 °F) 10/26/20 0826   Pulse 78 10/26/20 0846   Resp 16 10/26/20 0846   SpO2 95 % 10/26/20 0846         Event Time   Out of Recovery 09:12:00         Pain/Damaris Score: Damaris Score: 10 (10/26/2020  8:46 AM)
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
## 2020-10-30 LAB
FINAL PATHOLOGIC DIAGNOSIS: NORMAL
GROSS: NORMAL
Lab: NORMAL

## 2020-11-16 ENCOUNTER — PATIENT MESSAGE (OUTPATIENT)
Dept: FAMILY MEDICINE | Facility: CLINIC | Age: 60
End: 2020-11-16

## 2020-11-16 ENCOUNTER — PATIENT MESSAGE (OUTPATIENT)
Dept: GASTROENTEROLOGY | Facility: CLINIC | Age: 60
End: 2020-11-16

## 2020-11-19 ENCOUNTER — TELEPHONE (OUTPATIENT)
Dept: GASTROENTEROLOGY | Facility: CLINIC | Age: 60
End: 2020-11-19

## 2020-11-19 NOTE — TELEPHONE ENCOUNTER
----- Message from Fab Clark MD sent at 11/18/2020  3:18 PM CST -----  Please schedule pt office visit with Dr Zapata for U.C.

## 2020-11-19 NOTE — TELEPHONE ENCOUNTER
With the flu and pneumonia vaccine, they are possible before or during treatment.  Not enough information on the covid vaccine as yet.   Preferably prior to biologic start ensure good immune response, but during is ok if necessary.   Start biologic 2 weeks after injection.

## 2020-11-29 ENCOUNTER — PATIENT MESSAGE (OUTPATIENT)
Dept: GASTROENTEROLOGY | Facility: CLINIC | Age: 60
End: 2020-11-29

## 2020-12-07 RX ORDER — HYDROCHLOROTHIAZIDE 12.5 MG/1
12.5 CAPSULE ORAL DAILY
Qty: 90 CAPSULE | Refills: 1 | Status: SHIPPED | OUTPATIENT
Start: 2020-12-07 | End: 2021-03-18

## 2020-12-07 NOTE — TELEPHONE ENCOUNTER
Rec'd fax request from [facility] for HCTZ refill. Please review and advise.    LOV 8/24/20  Last CMP 8/24/20  Last refill 5/14/20 #[id number]

## 2020-12-21 ENCOUNTER — PATIENT MESSAGE (OUTPATIENT)
Dept: GASTROENTEROLOGY | Facility: CLINIC | Age: 60
End: 2020-12-21

## 2021-01-04 ENCOUNTER — PATIENT MESSAGE (OUTPATIENT)
Dept: ADMINISTRATIVE | Facility: HOSPITAL | Age: 61
End: 2021-01-04

## 2021-01-11 ENCOUNTER — OFFICE VISIT (OUTPATIENT)
Dept: URGENT CARE | Facility: CLINIC | Age: 61
End: 2021-01-11
Payer: MEDICARE

## 2021-01-11 VITALS
RESPIRATION RATE: 16 BRPM | WEIGHT: 155 LBS | HEIGHT: 66 IN | HEART RATE: 96 BPM | DIASTOLIC BLOOD PRESSURE: 67 MMHG | BODY MASS INDEX: 24.91 KG/M2 | SYSTOLIC BLOOD PRESSURE: 124 MMHG | OXYGEN SATURATION: 97 % | TEMPERATURE: 98 F

## 2021-01-11 DIAGNOSIS — R52 PAIN: ICD-10-CM

## 2021-01-11 DIAGNOSIS — M79.672 FOOT PAIN, LEFT: Primary | ICD-10-CM

## 2021-01-11 PROCEDURE — 3008F BODY MASS INDEX DOCD: CPT | Mod: CPTII,S$GLB,, | Performed by: PHYSICIAN ASSISTANT

## 2021-01-11 PROCEDURE — 99213 PR OFFICE/OUTPT VISIT, EST, LEVL III, 20-29 MIN: ICD-10-PCS | Mod: S$GLB,,, | Performed by: PHYSICIAN ASSISTANT

## 2021-01-11 PROCEDURE — 3008F PR BODY MASS INDEX (BMI) DOCUMENTED: ICD-10-PCS | Mod: CPTII,S$GLB,, | Performed by: PHYSICIAN ASSISTANT

## 2021-01-11 PROCEDURE — 73630 X-RAY EXAM OF FOOT: CPT | Mod: LT,S$GLB,, | Performed by: RADIOLOGY

## 2021-01-11 PROCEDURE — 99213 OFFICE O/P EST LOW 20 MIN: CPT | Mod: S$GLB,,, | Performed by: PHYSICIAN ASSISTANT

## 2021-01-11 PROCEDURE — 73630 XR FOOT COMPLETE 3 VIEW LEFT: ICD-10-PCS | Mod: LT,S$GLB,, | Performed by: RADIOLOGY

## 2021-01-13 ENCOUNTER — PATIENT OUTREACH (OUTPATIENT)
Dept: ADMINISTRATIVE | Facility: OTHER | Age: 61
End: 2021-01-13

## 2021-01-15 ENCOUNTER — LAB VISIT (OUTPATIENT)
Dept: LAB | Facility: HOSPITAL | Age: 61
End: 2021-01-15
Attending: INTERNAL MEDICINE
Payer: MEDICARE

## 2021-01-15 ENCOUNTER — OFFICE VISIT (OUTPATIENT)
Dept: GASTROENTEROLOGY | Facility: CLINIC | Age: 61
End: 2021-01-15
Payer: MEDICARE

## 2021-01-15 VITALS — WEIGHT: 156.5 LBS | HEIGHT: 66 IN | BODY MASS INDEX: 25.15 KG/M2

## 2021-01-15 DIAGNOSIS — E53.8 DEFICIENCY OF OTHER SPECIFIED B GROUP VITAMINS: ICD-10-CM

## 2021-01-15 DIAGNOSIS — D53.9 NUTRITIONAL ANEMIA, UNSPECIFIED: ICD-10-CM

## 2021-01-15 DIAGNOSIS — K63.9 DISEASE OF INTESTINE, UNSPECIFIED: ICD-10-CM

## 2021-01-15 DIAGNOSIS — R19.7 BLOODY DIARRHEA: ICD-10-CM

## 2021-01-15 DIAGNOSIS — K51.011 ULCERATIVE PANCOLITIS WITH RECTAL BLEEDING: Primary | ICD-10-CM

## 2021-01-15 DIAGNOSIS — K51.011 ULCERATIVE PANCOLITIS WITH RECTAL BLEEDING: ICD-10-CM

## 2021-01-15 LAB
BASOPHILS # BLD AUTO: 0.05 K/UL (ref 0–0.2)
BASOPHILS NFR BLD: 0.6 % (ref 0–1.9)
DIFFERENTIAL METHOD: ABNORMAL
EOSINOPHIL # BLD AUTO: 0.4 K/UL (ref 0–0.5)
EOSINOPHIL NFR BLD: 4.8 % (ref 0–8)
ERYTHROCYTE [DISTWIDTH] IN BLOOD BY AUTOMATED COUNT: 14.7 % (ref 11.5–14.5)
HCT VFR BLD AUTO: 41.2 % (ref 37–48.5)
HGB BLD-MCNC: 12.8 G/DL (ref 12–16)
IMM GRANULOCYTES # BLD AUTO: 0.02 K/UL (ref 0–0.04)
IMM GRANULOCYTES NFR BLD AUTO: 0.3 % (ref 0–0.5)
LYMPHOCYTES # BLD AUTO: 2 K/UL (ref 1–4.8)
LYMPHOCYTES NFR BLD: 25.8 % (ref 18–48)
MCH RBC QN AUTO: 28.5 PG (ref 27–31)
MCHC RBC AUTO-ENTMCNC: 31.1 G/DL (ref 32–36)
MCV RBC AUTO: 92 FL (ref 82–98)
MONOCYTES # BLD AUTO: 0.6 K/UL (ref 0.3–1)
MONOCYTES NFR BLD: 7.8 % (ref 4–15)
NEUTROPHILS # BLD AUTO: 4.8 K/UL (ref 1.8–7.7)
NEUTROPHILS NFR BLD: 60.7 % (ref 38–73)
NRBC BLD-RTO: 0 /100 WBC
PLATELET # BLD AUTO: 306 K/UL (ref 150–350)
PMV BLD AUTO: 10.9 FL (ref 9.2–12.9)
RBC # BLD AUTO: 4.49 M/UL (ref 4–5.4)
WBC # BLD AUTO: 7.92 K/UL (ref 3.9–12.7)

## 2021-01-15 PROCEDURE — 99214 PR OFFICE/OUTPT VISIT, EST, LEVL IV, 30-39 MIN: ICD-10-PCS | Mod: S$GLB,,, | Performed by: INTERNAL MEDICINE

## 2021-01-15 PROCEDURE — 80053 COMPREHEN METABOLIC PANEL: CPT

## 2021-01-15 PROCEDURE — 82607 VITAMIN B-12: CPT

## 2021-01-15 PROCEDURE — 99499 UNLISTED E&M SERVICE: CPT | Mod: S$GLB,,, | Performed by: INTERNAL MEDICINE

## 2021-01-15 PROCEDURE — 83516 IMMUNOASSAY NONANTIBODY: CPT

## 2021-01-15 PROCEDURE — 1125F PR PAIN SEVERITY QUANTIFIED, PAIN PRESENT: ICD-10-PCS | Mod: S$GLB,,, | Performed by: INTERNAL MEDICINE

## 2021-01-15 PROCEDURE — 99214 OFFICE O/P EST MOD 30 MIN: CPT | Mod: S$GLB,,, | Performed by: INTERNAL MEDICINE

## 2021-01-15 PROCEDURE — 1125F AMNT PAIN NOTED PAIN PRSNT: CPT | Mod: S$GLB,,, | Performed by: INTERNAL MEDICINE

## 2021-01-15 PROCEDURE — 99999 PR PBB SHADOW E&M-EST. PATIENT-LVL III: CPT | Mod: PBBFAC,,, | Performed by: INTERNAL MEDICINE

## 2021-01-15 PROCEDURE — 99499 RISK ADDL DX/OHS AUDIT: ICD-10-PCS | Mod: S$GLB,,, | Performed by: INTERNAL MEDICINE

## 2021-01-15 PROCEDURE — 99999 PR PBB SHADOW E&M-EST. PATIENT-LVL III: ICD-10-PCS | Mod: PBBFAC,,, | Performed by: INTERNAL MEDICINE

## 2021-01-15 PROCEDURE — 3008F PR BODY MASS INDEX (BMI) DOCUMENTED: ICD-10-PCS | Mod: CPTII,S$GLB,, | Performed by: INTERNAL MEDICINE

## 2021-01-15 PROCEDURE — 82746 ASSAY OF FOLIC ACID SERUM: CPT

## 2021-01-15 PROCEDURE — 3008F BODY MASS INDEX DOCD: CPT | Mod: CPTII,S$GLB,, | Performed by: INTERNAL MEDICINE

## 2021-01-15 PROCEDURE — 36415 COLL VENOUS BLD VENIPUNCTURE: CPT | Mod: PO

## 2021-01-15 PROCEDURE — 86140 C-REACTIVE PROTEIN: CPT

## 2021-01-15 PROCEDURE — 85025 COMPLETE CBC W/AUTO DIFF WBC: CPT

## 2021-01-15 PROCEDURE — 82784 ASSAY IGA/IGD/IGG/IGM EACH: CPT

## 2021-01-15 RX ORDER — METHOTREXATE 2.5 MG/1
15 TABLET ORAL
Qty: 24 TABLET | Refills: 11 | Status: SHIPPED | OUTPATIENT
Start: 2021-01-15 | End: 2022-09-29

## 2021-01-15 RX ORDER — ERGOCALCIFEROL 1.25 MG/1
50000 CAPSULE ORAL
Qty: 12 CAPSULE | Refills: 3 | Status: SHIPPED | OUTPATIENT
Start: 2021-01-15 | End: 2021-04-15

## 2021-01-15 RX ORDER — FOLIC ACID 1 MG/1
5 TABLET ORAL
Qty: 20 TABLET | Refills: 11 | Status: SHIPPED | OUTPATIENT
Start: 2021-01-15 | End: 2022-09-29

## 2021-01-16 LAB
ALBUMIN SERPL BCP-MCNC: 4 G/DL (ref 3.5–5.2)
ALP SERPL-CCNC: 83 U/L (ref 55–135)
ALT SERPL W/O P-5'-P-CCNC: 13 U/L (ref 10–44)
ANION GAP SERPL CALC-SCNC: 9 MMOL/L (ref 8–16)
AST SERPL-CCNC: 14 U/L (ref 10–40)
BILIRUB SERPL-MCNC: 0.2 MG/DL (ref 0.1–1)
BUN SERPL-MCNC: 13 MG/DL (ref 6–20)
CALCIUM SERPL-MCNC: 9.3 MG/DL (ref 8.7–10.5)
CHLORIDE SERPL-SCNC: 103 MMOL/L (ref 95–110)
CO2 SERPL-SCNC: 29 MMOL/L (ref 23–29)
CREAT SERPL-MCNC: 0.7 MG/DL (ref 0.5–1.4)
CRP SERPL-MCNC: 2.5 MG/L (ref 0–8.2)
EST. GFR  (AFRICAN AMERICAN): >60 ML/MIN/1.73 M^2
EST. GFR  (NON AFRICAN AMERICAN): >60 ML/MIN/1.73 M^2
FOLATE SERPL-MCNC: 8.9 NG/ML (ref 4–24)
GLUCOSE SERPL-MCNC: 94 MG/DL (ref 70–110)
IGA SERPL-MCNC: 119 MG/DL (ref 40–350)
POTASSIUM SERPL-SCNC: 4 MMOL/L (ref 3.5–5.1)
PROT SERPL-MCNC: 7.2 G/DL (ref 6–8.4)
SODIUM SERPL-SCNC: 141 MMOL/L (ref 136–145)
VIT B12 SERPL-MCNC: 426 PG/ML (ref 210–950)

## 2021-01-23 LAB — TTG IGA SER-ACNC: 3 UNITS

## 2021-01-24 RX ORDER — METHYLPREDNISOLONE SOD SUCC 125 MG
40 VIAL (EA) INJECTION
Status: CANCELLED | OUTPATIENT
Start: 2021-01-24

## 2021-01-24 RX ORDER — DIPHENHYDRAMINE HYDROCHLORIDE 50 MG/ML
25 INJECTION INTRAMUSCULAR; INTRAVENOUS
Status: CANCELLED | OUTPATIENT
Start: 2021-01-24

## 2021-01-24 RX ORDER — EPINEPHRINE 1 MG/ML
0.3 INJECTION, SOLUTION, CONCENTRATE INTRAVENOUS
Status: CANCELLED | OUTPATIENT
Start: 2021-01-24

## 2021-01-24 RX ORDER — SODIUM CHLORIDE 0.9 % (FLUSH) 0.9 %
10 SYRINGE (ML) INJECTION
Status: CANCELLED | OUTPATIENT
Start: 2021-01-24

## 2021-01-24 RX ORDER — ACETAMINOPHEN 325 MG/1
650 TABLET ORAL
Status: CANCELLED | OUTPATIENT
Start: 2021-01-24

## 2021-01-24 RX ORDER — IPRATROPIUM BROMIDE AND ALBUTEROL SULFATE 2.5; .5 MG/3ML; MG/3ML
3 SOLUTION RESPIRATORY (INHALATION)
Status: CANCELLED | OUTPATIENT
Start: 2021-01-24

## 2021-01-24 RX ORDER — HEPARIN 100 UNIT/ML
500 SYRINGE INTRAVENOUS
Status: CANCELLED | OUTPATIENT
Start: 2021-01-24

## 2021-01-24 RX ORDER — ACETAMINOPHEN 325 MG/1
650 TABLET ORAL ONCE
Status: CANCELLED | OUTPATIENT
Start: 2021-01-24 | End: 2021-01-24

## 2021-01-26 ENCOUNTER — PATIENT MESSAGE (OUTPATIENT)
Dept: GASTROENTEROLOGY | Facility: CLINIC | Age: 61
End: 2021-01-26

## 2021-02-02 ENCOUNTER — PATIENT MESSAGE (OUTPATIENT)
Dept: GASTROENTEROLOGY | Facility: CLINIC | Age: 61
End: 2021-02-02

## 2021-02-10 ENCOUNTER — TELEPHONE (OUTPATIENT)
Dept: INFUSION THERAPY | Facility: HOSPITAL | Age: 61
End: 2021-02-10

## 2021-02-11 ENCOUNTER — TELEPHONE (OUTPATIENT)
Dept: GASTROENTEROLOGY | Facility: CLINIC | Age: 61
End: 2021-02-11

## 2021-02-15 ENCOUNTER — LAB VISIT (OUTPATIENT)
Dept: LAB | Facility: HOSPITAL | Age: 61
End: 2021-02-15
Attending: INTERNAL MEDICINE
Payer: MEDICARE

## 2021-02-15 DIAGNOSIS — K51.011 ULCERATIVE PANCOLITIS WITH RECTAL BLEEDING: ICD-10-CM

## 2021-02-15 PROCEDURE — 87340 HEPATITIS B SURFACE AG IA: CPT

## 2021-02-15 PROCEDURE — 86706 HEP B SURFACE ANTIBODY: CPT

## 2021-02-15 PROCEDURE — 86481 TB AG RESPONSE T-CELL SUSP: CPT

## 2021-02-15 PROCEDURE — 36415 COLL VENOUS BLD VENIPUNCTURE: CPT | Mod: PN

## 2021-02-15 PROCEDURE — 86803 HEPATITIS C AB TEST: CPT

## 2021-02-15 PROCEDURE — 86790 VIRUS ANTIBODY NOS: CPT

## 2021-02-17 LAB
HBV SURFACE AB SER-ACNC: NEGATIVE M[IU]/ML
HBV SURFACE AG SERPL QL IA: NEGATIVE
HCV AB SERPL QL IA: NEGATIVE
HEPATITIS A ANTIBODY, IGG: NEGATIVE

## 2021-02-19 ENCOUNTER — TELEPHONE (OUTPATIENT)
Dept: GASTROENTEROLOGY | Facility: CLINIC | Age: 61
End: 2021-02-19

## 2021-02-23 DIAGNOSIS — K51.011 ULCERATIVE PANCOLITIS WITH RECTAL BLEEDING: Primary | ICD-10-CM

## 2021-03-09 ENCOUNTER — TELEPHONE (OUTPATIENT)
Dept: GASTROENTEROLOGY | Facility: CLINIC | Age: 61
End: 2021-03-09

## 2021-03-17 ENCOUNTER — PATIENT MESSAGE (OUTPATIENT)
Dept: GASTROENTEROLOGY | Facility: CLINIC | Age: 61
End: 2021-03-17

## 2021-04-06 ENCOUNTER — PATIENT MESSAGE (OUTPATIENT)
Dept: ADMINISTRATIVE | Facility: HOSPITAL | Age: 61
End: 2021-04-06

## 2021-05-21 ENCOUNTER — TELEPHONE (OUTPATIENT)
Dept: FAMILY MEDICINE | Facility: CLINIC | Age: 61
End: 2021-05-21

## 2021-05-21 ENCOUNTER — NURSE TRIAGE (OUTPATIENT)
Dept: ADMINISTRATIVE | Facility: CLINIC | Age: 61
End: 2021-05-21

## 2021-05-26 ENCOUNTER — PATIENT MESSAGE (OUTPATIENT)
Dept: RADIOLOGY | Facility: HOSPITAL | Age: 61
End: 2021-05-26

## 2021-05-27 ENCOUNTER — PATIENT MESSAGE (OUTPATIENT)
Dept: FAMILY MEDICINE | Facility: CLINIC | Age: 61
End: 2021-05-27

## 2021-06-16 ENCOUNTER — OFFICE VISIT (OUTPATIENT)
Dept: FAMILY MEDICINE | Facility: CLINIC | Age: 61
End: 2021-06-16
Payer: MEDICARE

## 2021-06-16 VITALS
DIASTOLIC BLOOD PRESSURE: 68 MMHG | BODY MASS INDEX: 25.66 KG/M2 | SYSTOLIC BLOOD PRESSURE: 94 MMHG | TEMPERATURE: 98 F | HEART RATE: 80 BPM | HEIGHT: 66 IN | OXYGEN SATURATION: 96 % | WEIGHT: 159.63 LBS

## 2021-06-16 DIAGNOSIS — N20.0 RECURRENT NEPHROLITHIASIS: Primary | ICD-10-CM

## 2021-06-16 DIAGNOSIS — Z23 NEED FOR VACCINATION: ICD-10-CM

## 2021-06-16 DIAGNOSIS — F98.8 ATTENTION DEFICIT DISORDER, UNSPECIFIED HYPERACTIVITY PRESENCE: ICD-10-CM

## 2021-06-16 PROCEDURE — 99499 UNLISTED E&M SERVICE: CPT | Mod: S$GLB,,, | Performed by: INTERNAL MEDICINE

## 2021-06-16 PROCEDURE — G0009 PNEUMOCOCCAL POLYSACCHARIDE VACCINE 23-VALENT =>2YO SQ IM: ICD-10-PCS | Mod: S$GLB,,, | Performed by: INTERNAL MEDICINE

## 2021-06-16 PROCEDURE — 1125F PR PAIN SEVERITY QUANTIFIED, PAIN PRESENT: ICD-10-PCS | Mod: S$GLB,,, | Performed by: INTERNAL MEDICINE

## 2021-06-16 PROCEDURE — 1125F AMNT PAIN NOTED PAIN PRSNT: CPT | Mod: S$GLB,,, | Performed by: INTERNAL MEDICINE

## 2021-06-16 PROCEDURE — 99499 RISK ADDL DX/OHS AUDIT: ICD-10-PCS | Mod: S$GLB,,, | Performed by: INTERNAL MEDICINE

## 2021-06-16 PROCEDURE — 99999 PR PBB SHADOW E&M-EST. PATIENT-LVL IV: ICD-10-PCS | Mod: PBBFAC,,, | Performed by: INTERNAL MEDICINE

## 2021-06-16 PROCEDURE — G0009 ADMIN PNEUMOCOCCAL VACCINE: HCPCS | Mod: S$GLB,,, | Performed by: INTERNAL MEDICINE

## 2021-06-16 PROCEDURE — 3008F BODY MASS INDEX DOCD: CPT | Mod: CPTII,S$GLB,, | Performed by: INTERNAL MEDICINE

## 2021-06-16 PROCEDURE — 3008F PR BODY MASS INDEX (BMI) DOCUMENTED: ICD-10-PCS | Mod: CPTII,S$GLB,, | Performed by: INTERNAL MEDICINE

## 2021-06-16 PROCEDURE — 90732 PPSV23 VACC 2 YRS+ SUBQ/IM: CPT | Mod: S$GLB,,, | Performed by: INTERNAL MEDICINE

## 2021-06-16 PROCEDURE — 99214 OFFICE O/P EST MOD 30 MIN: CPT | Mod: 25,S$GLB,, | Performed by: INTERNAL MEDICINE

## 2021-06-16 PROCEDURE — 99999 PR PBB SHADOW E&M-EST. PATIENT-LVL IV: CPT | Mod: PBBFAC,,, | Performed by: INTERNAL MEDICINE

## 2021-06-16 PROCEDURE — 90732 PNEUMOCOCCAL POLYSACCHARIDE VACCINE 23-VALENT =>2YO SQ IM: ICD-10-PCS | Mod: S$GLB,,, | Performed by: INTERNAL MEDICINE

## 2021-06-16 PROCEDURE — 99214 PR OFFICE/OUTPT VISIT, EST, LEVL IV, 30-39 MIN: ICD-10-PCS | Mod: 25,S$GLB,, | Performed by: INTERNAL MEDICINE

## 2021-06-16 RX ORDER — ERGOCALCIFEROL 1.25 MG/1
CAPSULE ORAL
COMMUNITY
Start: 2021-05-25 | End: 2021-10-02

## 2021-06-16 RX ORDER — DEXTROAMPHETAMINE SACCHARATE, AMPHETAMINE ASPARTATE, DEXTROAMPHETAMINE SULFATE AND AMPHETAMINE SULFATE 2.5; 2.5; 2.5; 2.5 MG/1; MG/1; MG/1; MG/1
10 TABLET ORAL 2 TIMES DAILY
Qty: 60 TABLET | Refills: 0 | Status: SHIPPED | OUTPATIENT
Start: 2021-07-10 | End: 2022-02-18 | Stop reason: SDUPTHER

## 2021-06-16 RX ORDER — HYDROCHLOROTHIAZIDE 12.5 MG/1
12.5 CAPSULE ORAL DAILY
Qty: 90 CAPSULE | Refills: 1 | Status: SHIPPED | OUTPATIENT
Start: 2021-06-16 | End: 2022-02-18 | Stop reason: SDUPTHER

## 2021-06-25 ENCOUNTER — OFFICE VISIT (OUTPATIENT)
Dept: URGENT CARE | Facility: CLINIC | Age: 61
End: 2021-06-25
Payer: MEDICARE

## 2021-06-25 VITALS
HEART RATE: 66 BPM | RESPIRATION RATE: 16 BRPM | OXYGEN SATURATION: 96 % | BODY MASS INDEX: 25.55 KG/M2 | SYSTOLIC BLOOD PRESSURE: 112 MMHG | DIASTOLIC BLOOD PRESSURE: 61 MMHG | WEIGHT: 159 LBS | TEMPERATURE: 99 F | HEIGHT: 66 IN

## 2021-06-25 DIAGNOSIS — L25.5 CONTACT DERMATITIS DUE TO PLANT: Primary | ICD-10-CM

## 2021-06-25 PROCEDURE — 3008F PR BODY MASS INDEX (BMI) DOCUMENTED: ICD-10-PCS | Mod: CPTII,S$GLB,, | Performed by: PHYSICIAN ASSISTANT

## 2021-06-25 PROCEDURE — 3008F BODY MASS INDEX DOCD: CPT | Mod: CPTII,S$GLB,, | Performed by: PHYSICIAN ASSISTANT

## 2021-06-25 PROCEDURE — 99214 OFFICE O/P EST MOD 30 MIN: CPT | Mod: S$GLB,,, | Performed by: PHYSICIAN ASSISTANT

## 2021-06-25 PROCEDURE — 99214 PR OFFICE/OUTPT VISIT, EST, LEVL IV, 30-39 MIN: ICD-10-PCS | Mod: S$GLB,,, | Performed by: PHYSICIAN ASSISTANT

## 2021-06-25 RX ORDER — TRIAMCINOLONE ACETONIDE 5 MG/G
CREAM TOPICAL 2 TIMES DAILY
Qty: 60 G | Refills: 0 | Status: SHIPPED | OUTPATIENT
Start: 2021-06-25 | End: 2023-06-21

## 2021-07-07 ENCOUNTER — PATIENT MESSAGE (OUTPATIENT)
Dept: ADMINISTRATIVE | Facility: HOSPITAL | Age: 61
End: 2021-07-07

## 2021-09-02 ENCOUNTER — TELEPHONE (OUTPATIENT)
Dept: GASTROENTEROLOGY | Facility: CLINIC | Age: 61
End: 2021-09-02

## 2021-09-20 ENCOUNTER — PATIENT MESSAGE (OUTPATIENT)
Dept: GASTROENTEROLOGY | Facility: CLINIC | Age: 61
End: 2021-09-20

## 2021-12-01 DIAGNOSIS — Z00.00 ROUTINE GENERAL MEDICAL EXAMINATION AT A HEALTH CARE FACILITY: ICD-10-CM

## 2021-12-01 RX ORDER — CYCLOBENZAPRINE HCL 10 MG
TABLET ORAL
Qty: 90 TABLET | Refills: 0 | Status: SHIPPED | OUTPATIENT
Start: 2021-12-01 | End: 2022-02-18 | Stop reason: SDUPTHER

## 2022-01-12 ENCOUNTER — PATIENT MESSAGE (OUTPATIENT)
Dept: GASTROENTEROLOGY | Facility: CLINIC | Age: 62
End: 2022-01-12
Payer: MEDICARE

## 2022-01-24 ENCOUNTER — PATIENT MESSAGE (OUTPATIENT)
Dept: ADMINISTRATIVE | Facility: HOSPITAL | Age: 62
End: 2022-01-24
Payer: MEDICARE

## 2022-01-24 ENCOUNTER — PATIENT OUTREACH (OUTPATIENT)
Dept: ADMINISTRATIVE | Facility: HOSPITAL | Age: 62
End: 2022-01-24
Payer: MEDICARE

## 2022-01-24 NOTE — PROGRESS NOTES
Non-compliant report chart audits for CERVICAL CANCER SCREENING. Chart review completed for HM test overdue (mammograms, Colonoscopies, pap smears, DM labs, and/or EYE EXAMs)      Care Everywhere and media, updates requested and reviewed.      [facility] and [facility] reviewed.      Portal message sent to patient      Outreach:  Cervical cancer screening

## 2022-01-24 NOTE — LETTER
February 1, 2022    Vesta TELLEZ Garrett  [address] [place] [address]             [facility]  [address]  [place] [address]  Phone: [phone] Dear Mrs. Tucker:    We have tried to reach you by mychart unsuccessfully.    Your [facility] care is dedicated to helping you stay healthy with regular scheduled recommended screenings.  Scheduling routine screenings is important to maintaining good health. Our records indicate that you may be overdue for your screening pap smear.  Pap smear screening can help identify patients at risk for developing cervical cancer at an early stage, when it is most likely to be successfully treated.     The current recommendation for Pap smear screening is every 3-5 years.  We encourage you to schedule your appointment with your [facility] provider.  Many women see a Gynecologist for this screening but some primary care providers also provide Pap screening.     If you recently had your pap smear screening performed outside of [facility], please let your health care team know so that they can update your health record.      If you have any questions please do not hesitate to call.     Sincerely,     Shaina Rasmussen MD and your [facility] Team

## 2022-02-04 ENCOUNTER — PATIENT OUTREACH (OUTPATIENT)
Dept: ADMINISTRATIVE | Facility: HOSPITAL | Age: 62
End: 2022-02-04
Payer: MEDICARE

## 2022-02-04 ENCOUNTER — PATIENT MESSAGE (OUTPATIENT)
Dept: ADMINISTRATIVE | Facility: HOSPITAL | Age: 62
End: 2022-02-04
Payer: MEDICARE

## 2022-02-04 NOTE — PROGRESS NOTES
2022 Care Everywhere updates requested and reviewed.  Immunizations reconciled. Media reports reviewed.  Duplicate HM overrides and  orders removed.  Overdue HM topic chart audit and/or requested.  Overdue lab testing linked to upcoming lab appointments if applies.  Lab genoveva, and [facility] reviewed   Pap Smear and Lab testing     DIS reviewed      Mammogram    Health Maintenance Due   Topic Date Due    HIV Screening  Never done    TETANUS VACCINE  Never done    Shingles Vaccine (1 of 2) Never done    Cervical Cancer Screening  2018    Mammogram  2018    Influenza Vaccine (1) Never done

## 2022-02-18 ENCOUNTER — OFFICE VISIT (OUTPATIENT)
Dept: FAMILY MEDICINE | Facility: CLINIC | Age: 62
End: 2022-02-18
Payer: MEDICARE

## 2022-02-18 ENCOUNTER — PATIENT MESSAGE (OUTPATIENT)
Dept: GASTROENTEROLOGY | Facility: CLINIC | Age: 62
End: 2022-02-18

## 2022-02-18 DIAGNOSIS — N20.0 RECURRENT NEPHROLITHIASIS: ICD-10-CM

## 2022-02-18 DIAGNOSIS — Z00.00 ROUTINE GENERAL MEDICAL EXAMINATION AT A HEALTH CARE FACILITY: ICD-10-CM

## 2022-02-18 DIAGNOSIS — F98.8 ATTENTION DEFICIT DISORDER, UNSPECIFIED HYPERACTIVITY PRESENCE: ICD-10-CM

## 2022-02-18 PROCEDURE — 99214 OFFICE O/P EST MOD 30 MIN: CPT | Mod: 95,,, | Performed by: FAMILY MEDICINE

## 2022-02-18 PROCEDURE — 99214 PR OFFICE/OUTPT VISIT, EST, LEVL IV, 30-39 MIN: ICD-10-PCS | Mod: 95,,, | Performed by: FAMILY MEDICINE

## 2022-02-18 RX ORDER — HYDROCHLOROTHIAZIDE 12.5 MG/1
12.5 CAPSULE ORAL DAILY
Qty: 90 CAPSULE | Refills: 1 | Status: SHIPPED | OUTPATIENT
Start: 2022-02-18 | End: 2022-08-23 | Stop reason: SDUPTHER

## 2022-02-18 RX ORDER — CYCLOBENZAPRINE HCL 10 MG
10 TABLET ORAL 3 TIMES DAILY PRN
Qty: 90 TABLET | Refills: 1 | Status: SHIPPED | OUTPATIENT
Start: 2022-02-18 | End: 2022-04-04

## 2022-02-18 RX ORDER — FAMOTIDINE 40 MG/1
40 TABLET, FILM COATED ORAL 2 TIMES DAILY PRN
Qty: 180 TABLET | Refills: 3 | Status: SHIPPED | OUTPATIENT
Start: 2022-02-18 | End: 2023-06-22

## 2022-02-18 RX ORDER — DEXTROAMPHETAMINE SACCHARATE, AMPHETAMINE ASPARTATE, DEXTROAMPHETAMINE SULFATE AND AMPHETAMINE SULFATE 2.5; 2.5; 2.5; 2.5 MG/1; MG/1; MG/1; MG/1
10 TABLET ORAL 2 TIMES DAILY
Qty: 60 TABLET | Refills: 0 | Status: SHIPPED | OUTPATIENT
Start: 2022-02-18 | End: 2023-06-21 | Stop reason: SDUPTHER

## 2022-02-18 RX ORDER — ERGOCALCIFEROL 1.25 MG/1
CAPSULE ORAL
Qty: 12 CAPSULE | Refills: 3 | Status: SHIPPED | OUTPATIENT
Start: 2022-02-18 | End: 2023-06-21

## 2022-02-18 NOTE — PROGRESS NOTES
Subjective:       Patient ID: Vesta Tucker is a 62 y.o. female.    Chief Complaint: Medication Refill      The patient location is: home, [place]  The chief complaint leading to consultation is: med refills  Visit type: Virtual visit with synchronous audio and video  Total time spent with patient: 15mins incl doc  Each patient to whom he or she provides medical services by telemedicine is:  (1) informed of the relationship between the physician and patient and the respective role of any other health care provider with respect to management of the patient; and (2) notified that he or she may decline to receive medical services by telemedicine and may withdraw from such care at any time.    Notes: Patient seen for f/u.   She was unable to continue to afford the infusions. Her oop expense was more than she could afford. We discussed researching costs     Review of Systems   Constitutional: Negative for activity change and unexpected weight change.   HENT: Negative for hearing loss, rhinorrhea and trouble swallowing.    Eyes: Negative for discharge and visual disturbance.   Respiratory: Negative for chest tightness and wheezing.    Cardiovascular: Negative for chest pain and palpitations.   Gastrointestinal: Positive for blood in stool and diarrhea. Negative for constipation and vomiting.   Endocrine: Negative for polydipsia and polyuria.   Genitourinary: Negative for difficulty urinating, dysuria, hematuria and menstrual problem.   Musculoskeletal: Negative for arthralgias, joint swelling and neck pain.   Neurological: Negative for weakness and headaches.   Psychiatric/Behavioral: Negative for confusion and dysphoric mood.       Objective:        Physical Exam  Vitals and nursing note reviewed.   Constitutional:       General: She is not in acute distress.     Appearance: Normal appearance. She is well-developed.   HENT:      Head: Normocephalic and atraumatic.   Eyes:      General: No scleral icterus.        Right eye:  No discharge.         Left eye: No discharge.      Conjunctiva/sclera: Conjunctivae normal.   Pulmonary:      Effort: Pulmonary effort is normal. No respiratory distress.   Skin:     General: Skin is warm and dry.   Neurological:      General: No focal deficit present.      Mental Status: She is alert and oriented to person, place, and time.   Psychiatric:         Mood and Affect: Mood normal.         Behavior: Behavior normal.             Assessment:   Attention deficit disorder, unspecified hyperactivity presence  -     dextroamphetamine-amphetamine 10 mg Tab; Take 1 tablet (10 mg total) by mouth 2 (two) times a day.  Dispense: 60 tablet; Refill: 0    Recurrent nephrolithiasis  -     hydroCHLOROthiazide (MICROZIDE) 12.5 mg capsule; Take 1 capsule (12.5 mg total) by mouth once daily.  Dispense: 90 capsule; Refill: 1    Routine general medical examination at a health care facility  -     cyclobenzaprine (FLEXERIL) 10 MG tablet; Take 1 tablet (10 mg total) by mouth 3 (three) times daily as needed.  Dispense: 90 tablet; Refill: 1    Other orders  -     famotidine (PEPCID) 40 MG tablet; Take 1 tablet (40 mg total) by mouth 2 (two) times daily as needed for Heartburn.  Dispense: 180 tablet; Refill: 3  -     ergocalciferol (VITAMIN D2) 50,000 unit Cap; TAKE 1 CAPSULE BY MOUTH EVERY 7 DAYS AS DIRECTED  Dispense: 12 capsule; Refill: 3      Patient aware of limitations to assessment and exam of telemedicine. Deemed appropriate at this time given current covid-19 concerns.

## 2022-02-27 ENCOUNTER — PATIENT MESSAGE (OUTPATIENT)
Dept: GASTROENTEROLOGY | Facility: CLINIC | Age: 62
End: 2022-02-27

## 2022-03-29 DIAGNOSIS — Z00.00 ROUTINE GENERAL MEDICAL EXAMINATION AT A HEALTH CARE FACILITY: ICD-10-CM

## 2022-03-29 NOTE — TELEPHONE ENCOUNTER
Care Due:                  Date            Visit Type   Department     Provider  --------------------------------------------------------------------------------                                ESTABLISHED                              PATIENT -    [facility]  Last Visit: 02-      [facility]      MEDICINE       Shaina Rasmussen  Next Visit: None Scheduled  None         None Found                                                            Last  Test          Frequency    Reason                     Performed    Due Date  --------------------------------------------------------------------------------    CMP.........  12 months..  famotidine,                01-   01-                             hydroCHLOROthiazide......    Powered by [employer]. Reference number: [id number].   3/29/2022 3:45:22 PM CDT

## 2022-04-04 ENCOUNTER — PATIENT MESSAGE (OUTPATIENT)
Dept: GASTROENTEROLOGY | Facility: CLINIC | Age: 62
End: 2022-04-04

## 2022-04-04 RX ORDER — CYCLOBENZAPRINE HCL 10 MG
TABLET ORAL
Qty: 90 TABLET | Refills: 1 | Status: SHIPPED | OUTPATIENT
Start: 2022-04-04 | End: 2022-08-31

## 2022-04-05 ENCOUNTER — PES CALL (OUTPATIENT)
Dept: ADMINISTRATIVE | Facility: CLINIC | Age: 62
End: 2022-04-05

## 2022-04-08 RX ORDER — METHOTREXATE 2.5 MG/1
15 TABLET ORAL
Qty: 24 TABLET | Refills: 11 | OUTPATIENT
Start: 2022-04-08 | End: 2023-04-08

## 2022-04-08 RX ORDER — FOLIC ACID 1 MG/1
5 TABLET ORAL
Qty: 20 TABLET | Refills: 11 | OUTPATIENT
Start: 2022-04-08 | End: 2023-04-08

## 2022-04-08 NOTE — TELEPHONE ENCOUNTER
----- Message from Wm Zapata MD sent at 4/8/2022  8:04 AM CDT -----  I will not refill this patient's medications unless she is seen for an appointment.

## 2022-05-31 ENCOUNTER — PATIENT MESSAGE (OUTPATIENT)
Dept: ADMINISTRATIVE | Facility: HOSPITAL | Age: 62
End: 2022-05-31

## 2022-06-02 ENCOUNTER — PES CALL (OUTPATIENT)
Dept: ADMINISTRATIVE | Facility: CLINIC | Age: 62
End: 2022-06-02

## 2022-08-23 DIAGNOSIS — N20.0 RECURRENT NEPHROLITHIASIS: ICD-10-CM

## 2022-08-23 NOTE — TELEPHONE ENCOUNTER
----- Message from Vadim Fisher sent at 8/23/2022  3:55 PM CDT -----  Contact: self  Type: Sooner Appointment Request        Caller is requesting a sooner appointment. Caller declined first available appointment listed below. Caller will not accept being placed on the waitlist and is requesting a message be sent to doctor.        Name of Caller: patient   When is the first available appointment? N/a  Best Call Back Number: [phone]  Additional Information: Pt just wants to know does she need to do blood work for Dr. Rasmussen Before her appt with her on 9/27. Plz call and confirm appt with her if those labs are needed before appt. Thanks

## 2022-08-23 NOTE — TELEPHONE ENCOUNTER
Care Due:                  Date            Visit Type   Department     Provider  --------------------------------------------------------------------------------                                ESTABLISHED                              PATIENT -    [facility]  Last Visit: 02-      VIRTUAL      MEDICINE       Shaina Rasmussen                              EP -                              PRIMARY      [facility]  Next Visit: 09-      CARE ([facility])   MEDICINE       Shaina Rasmussen                                                            Last  Test          Frequency    Reason                     Performed    Due Date  --------------------------------------------------------------------------------    CMP.........  12 months..  famotidine,                01-   01-                             hydroCHLOROthiazide......    [employer] Embedded Care Gaps. Reference number: [id number]. 8/23/2022   4:29:32 PM CDT

## 2022-08-24 ENCOUNTER — PATIENT MESSAGE (OUTPATIENT)
Dept: ADMINISTRATIVE | Facility: HOSPITAL | Age: 62
End: 2022-08-24
Payer: COMMERCIAL

## 2022-08-24 RX ORDER — HYDROCHLOROTHIAZIDE 12.5 MG/1
12.5 CAPSULE ORAL DAILY
Qty: 90 CAPSULE | Refills: 0 | Status: SHIPPED | OUTPATIENT
Start: 2022-08-24 | End: 2022-12-14

## 2022-08-30 RX ORDER — METHOTREXATE 2.5 MG/1
15 TABLET ORAL
Qty: 24 TABLET | Refills: 11 | OUTPATIENT
Start: 2022-08-30 | End: 2023-08-30

## 2022-09-08 ENCOUNTER — TELEPHONE (OUTPATIENT)
Dept: FAMILY MEDICINE | Facility: CLINIC | Age: 62
End: 2022-09-08
Payer: COMMERCIAL

## 2022-09-08 ENCOUNTER — PATIENT MESSAGE (OUTPATIENT)
Dept: FAMILY MEDICINE | Facility: CLINIC | Age: 62
End: 2022-09-08
Payer: COMMERCIAL

## 2022-09-13 ENCOUNTER — PATIENT MESSAGE (OUTPATIENT)
Dept: ADMINISTRATIVE | Facility: HOSPITAL | Age: 62
End: 2022-09-13
Payer: COMMERCIAL

## 2022-09-13 ENCOUNTER — PATIENT OUTREACH (OUTPATIENT)
Dept: ADMINISTRATIVE | Facility: HOSPITAL | Age: 62
End: 2022-09-13
Payer: COMMERCIAL

## 2022-09-13 NOTE — LETTER
September 21, 2022    Vesta Tucker  [address] [place] [address]             [facility]  [address]  [place] [address]  Phone: [phone] Dear Vesta,    We have tried to reach you by [facility] email unsuccessfully.     [facility] committed to your overall health.  To help you get the most out of each of your visits, we will review your information to make sure you are up to date on all of your recommended tests and/or procedures.       Shaina Rasmussen MD  has found that your chart shows you may be due for :         Health Maintenance Due   Topic    HIV Screening     TETANUS VACCINE     Shingles Vaccine    Cervical Cancer Screening     Mammogram     COVID-19 Vaccine (4 - Booster for Moderna series)    Pneumococcal Vaccines    Influenza Vaccine         If you have had any of the above done at another facility, please bring the records or information with you so that your record at [facility] will be complete.  If you would like to schedule any of these test, please call [phone] or send a message via [email or web address].        If you are currently taking medication, please bring it with you to your appointment for review.           Thank you for letting us care for you,        Shaina Rasmussen MD and your [facility] Team

## 2022-09-13 NOTE — PROGRESS NOTES
2022 Care Everywhere updates requested and reviewed.  Immunizations reconciled. Media reports reviewed.  Duplicate HM overrides and  orders removed.  Overdue HM topic chart audit and/or requested.  Overdue lab testing linked to upcoming lab appointments if applies.  Lab genoveva, and [facility] reviewed   Pap Smear and Lab testing     DIS reviewed      Mammogram     Health Maintenance Due   Topic Date Due    HIV Screening  Never done    TETANUS VACCINE  Never done    Shingles Vaccine (1 of 2) Never done    Cervical Cancer Screening  2018    Mammogram  2018    COVID-19 Vaccine (4 - Booster for Moderna series) 2022    Pneumococcal Vaccines (Age 0-64) (2 - PCV) 2022    Influenza Vaccine (1) Never done

## 2022-09-27 ENCOUNTER — OFFICE VISIT (OUTPATIENT)
Dept: FAMILY MEDICINE | Facility: CLINIC | Age: 62
End: 2022-09-27
Payer: COMMERCIAL

## 2022-09-27 VITALS
SYSTOLIC BLOOD PRESSURE: 110 MMHG | RESPIRATION RATE: 15 BRPM | DIASTOLIC BLOOD PRESSURE: 54 MMHG | HEIGHT: 66 IN | WEIGHT: 154.13 LBS | OXYGEN SATURATION: 97 % | HEART RATE: 87 BPM | BODY MASS INDEX: 24.77 KG/M2

## 2022-09-27 DIAGNOSIS — Z23 IMMUNIZATION DUE: ICD-10-CM

## 2022-09-27 DIAGNOSIS — Z12.31 SCREENING MAMMOGRAM FOR HIGH-RISK PATIENT: ICD-10-CM

## 2022-09-27 DIAGNOSIS — Z00.00 ROUTINE GENERAL MEDICAL EXAMINATION AT A HEALTH CARE FACILITY: ICD-10-CM

## 2022-09-27 DIAGNOSIS — E78.2 MIXED HYPERLIPIDEMIA: ICD-10-CM

## 2022-09-27 DIAGNOSIS — K51.011 ULCERATIVE PANCOLITIS WITH RECTAL BLEEDING: Primary | ICD-10-CM

## 2022-09-27 PROCEDURE — 99214 PR OFFICE/OUTPT VISIT, EST, LEVL IV, 30-39 MIN: ICD-10-PCS | Mod: 25,S$GLB,, | Performed by: FAMILY MEDICINE

## 2022-09-27 PROCEDURE — 90677 PCV20 VACCINE IM: CPT | Mod: S$GLB,,, | Performed by: FAMILY MEDICINE

## 2022-09-27 PROCEDURE — 99999 PR PBB SHADOW E&M-EST. PATIENT-LVL V: ICD-10-PCS | Mod: PBBFAC,,, | Performed by: FAMILY MEDICINE

## 2022-09-27 PROCEDURE — 99999 PR PBB SHADOW E&M-EST. PATIENT-LVL V: CPT | Mod: PBBFAC,,, | Performed by: FAMILY MEDICINE

## 2022-09-27 PROCEDURE — 99215 OFFICE O/P EST HI 40 MIN: CPT | Mod: PBBFAC,PN | Performed by: FAMILY MEDICINE

## 2022-09-27 PROCEDURE — 90471 PR IMMUNIZ ADMIN,1 SINGLE/COMB VAC/TOXOID: ICD-10-PCS | Mod: S$GLB,,, | Performed by: FAMILY MEDICINE

## 2022-09-27 PROCEDURE — 90677 PR PNEUMOCOCCAL CONJ VACCINE, 20 VALENT, IM: ICD-10-PCS | Mod: S$GLB,,, | Performed by: FAMILY MEDICINE

## 2022-09-27 PROCEDURE — 90677 PCV20 VACCINE IM: CPT | Mod: PBBFAC,PN

## 2022-09-27 PROCEDURE — 99214 OFFICE O/P EST MOD 30 MIN: CPT | Mod: 25,S$GLB,, | Performed by: FAMILY MEDICINE

## 2022-09-27 PROCEDURE — 90471 IMMUNIZATION ADMIN: CPT | Mod: S$GLB,,, | Performed by: FAMILY MEDICINE

## 2022-09-27 RX ORDER — ACETAMINOPHEN 325 MG/1
325 TABLET ORAL EVERY 6 HOURS PRN
COMMUNITY
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
## 2022-09-28 ENCOUNTER — TELEPHONE (OUTPATIENT)
Dept: FAMILY MEDICINE | Facility: CLINIC | Age: 62
End: 2022-09-28
Payer: COMMERCIAL

## 2022-09-29 RX ORDER — FOLIC ACID 1 MG/1
5 TABLET ORAL
Qty: 20 TABLET | Refills: 2 | Status: SHIPPED | OUTPATIENT
Start: 2022-09-29 | End: 2023-09-29

## 2022-09-29 RX ORDER — METHOTREXATE 2.5 MG/1
15 TABLET ORAL
Qty: 72 TABLET | Refills: 0 | Status: SHIPPED | OUTPATIENT
Start: 2022-09-29 | End: 2023-09-29

## 2022-10-10 ENCOUNTER — PATIENT MESSAGE (OUTPATIENT)
Dept: ADMINISTRATIVE | Facility: HOSPITAL | Age: 62
End: 2022-10-10
Payer: COMMERCIAL

## 2022-12-07 ENCOUNTER — PATIENT MESSAGE (OUTPATIENT)
Dept: FAMILY MEDICINE | Facility: CLINIC | Age: 62
End: 2022-12-07
Payer: COMMERCIAL

## 2022-12-09 ENCOUNTER — PATIENT OUTREACH (OUTPATIENT)
Dept: ADMINISTRATIVE | Facility: HOSPITAL | Age: 62
End: 2022-12-09
Payer: COMMERCIAL

## 2022-12-09 ENCOUNTER — PATIENT MESSAGE (OUTPATIENT)
Dept: ADMINISTRATIVE | Facility: HOSPITAL | Age: 62
End: 2022-12-09
Payer: COMMERCIAL

## 2022-12-12 ENCOUNTER — PATIENT OUTREACH (OUTPATIENT)
Dept: ADMINISTRATIVE | Facility: HOSPITAL | Age: 62
End: 2022-12-12
Payer: COMMERCIAL

## 2022-12-12 NOTE — PROGRESS NOTES
Non-compliant report chart audits for CERVICAL CANCER SCREENING. Chart review completed for HM test overdue (mammograms, Colonoscopies, pap smears, DM labs, and/or EYE EXAMs)      Care Everywhere and media, updates requested and reviewed.      [facility] and [facility] reviewed.      Outreach to patient in reference to cervical cancer screening.    Left message to return call to clinic    Outreach:  Cervical cancer screening

## 2023-01-17 ENCOUNTER — PATIENT MESSAGE (OUTPATIENT)
Dept: ADMINISTRATIVE | Facility: HOSPITAL | Age: 63
End: 2023-01-17
Payer: COMMERCIAL

## 2023-01-21 ENCOUNTER — OFFICE VISIT (OUTPATIENT)
Dept: URGENT CARE | Facility: CLINIC | Age: 63
End: 2023-01-21
Payer: COMMERCIAL

## 2023-01-21 VITALS
SYSTOLIC BLOOD PRESSURE: 113 MMHG | TEMPERATURE: 97 F | WEIGHT: 154 LBS | OXYGEN SATURATION: 99 % | RESPIRATION RATE: 20 BRPM | DIASTOLIC BLOOD PRESSURE: 70 MMHG | HEIGHT: 66 IN | BODY MASS INDEX: 24.75 KG/M2 | HEART RATE: 72 BPM

## 2023-01-21 DIAGNOSIS — Z87.442 HISTORY OF KIDNEY STONES: ICD-10-CM

## 2023-01-21 DIAGNOSIS — N20.0 KIDNEY STONE: Primary | ICD-10-CM

## 2023-01-21 DIAGNOSIS — R10.9 FLANK PAIN: ICD-10-CM

## 2023-01-21 DIAGNOSIS — R11.0 NAUSEA: ICD-10-CM

## 2023-01-21 DIAGNOSIS — R30.0 DYSURIA: ICD-10-CM

## 2023-01-21 LAB
BILIRUB UR QL STRIP: NEGATIVE
GLUCOSE UR QL STRIP: NEGATIVE
KETONES UR QL STRIP: NEGATIVE
LEUKOCYTE ESTERASE UR QL STRIP: NEGATIVE
PH, POC UA: 8 (ref 5–8)
POC BLOOD, URINE: NEGATIVE
POC NITRATES, URINE: NEGATIVE
PROT UR QL STRIP: NEGATIVE
SP GR UR STRIP: 1 (ref 1–1.03)
UROBILINOGEN UR STRIP-ACNC: NORMAL (ref 0.1–1.1)

## 2023-01-21 PROCEDURE — 96372 THER/PROPH/DIAG INJ SC/IM: CPT | Mod: S$GLB,,, | Performed by: EMERGENCY MEDICINE

## 2023-01-21 PROCEDURE — 1159F PR MEDICATION LIST DOCUMENTED IN MEDICAL RECORD: ICD-10-PCS | Mod: CPTII,S$GLB,, | Performed by: NURSE PRACTITIONER

## 2023-01-21 PROCEDURE — 3074F PR MOST RECENT SYSTOLIC BLOOD PRESSURE < 130 MM HG: ICD-10-PCS | Mod: CPTII,S$GLB,, | Performed by: NURSE PRACTITIONER

## 2023-01-21 PROCEDURE — 1160F RVW MEDS BY RX/DR IN RCRD: CPT | Mod: CPTII,S$GLB,, | Performed by: NURSE PRACTITIONER

## 2023-01-21 PROCEDURE — 1159F MED LIST DOCD IN RCRD: CPT | Mod: CPTII,S$GLB,, | Performed by: NURSE PRACTITIONER

## 2023-01-21 PROCEDURE — 3078F PR MOST RECENT DIASTOLIC BLOOD PRESSURE < 80 MM HG: ICD-10-PCS | Mod: CPTII,S$GLB,, | Performed by: NURSE PRACTITIONER

## 2023-01-21 PROCEDURE — 81003 URINALYSIS AUTO W/O SCOPE: CPT | Mod: QW,S$GLB,, | Performed by: NURSE PRACTITIONER

## 2023-01-21 PROCEDURE — 3074F SYST BP LT 130 MM HG: CPT | Mod: CPTII,S$GLB,, | Performed by: NURSE PRACTITIONER

## 2023-01-21 PROCEDURE — 99214 OFFICE O/P EST MOD 30 MIN: CPT | Mod: 25,S$GLB,, | Performed by: NURSE PRACTITIONER

## 2023-01-21 PROCEDURE — 3008F PR BODY MASS INDEX (BMI) DOCUMENTED: ICD-10-PCS | Mod: CPTII,S$GLB,, | Performed by: NURSE PRACTITIONER

## 2023-01-21 PROCEDURE — 99214 PR OFFICE/OUTPT VISIT, EST, LEVL IV, 30-39 MIN: ICD-10-PCS | Mod: 25,S$GLB,, | Performed by: NURSE PRACTITIONER

## 2023-01-21 PROCEDURE — 1160F PR REVIEW ALL MEDS BY PRESCRIBER/CLIN PHARMACIST DOCUMENTED: ICD-10-PCS | Mod: CPTII,S$GLB,, | Performed by: NURSE PRACTITIONER

## 2023-01-21 PROCEDURE — 3008F BODY MASS INDEX DOCD: CPT | Mod: CPTII,S$GLB,, | Performed by: NURSE PRACTITIONER

## 2023-01-21 PROCEDURE — 74019 RADEX ABDOMEN 2 VIEWS: CPT | Mod: S$GLB,,, | Performed by: RADIOLOGY

## 2023-01-21 PROCEDURE — 74019 XR ABDOMEN FLAT AND ERECT: ICD-10-PCS | Mod: S$GLB,,, | Performed by: RADIOLOGY

## 2023-01-21 PROCEDURE — 81003 POCT URINALYSIS, DIPSTICK, AUTOMATED, W/O SCOPE: ICD-10-PCS | Mod: QW,S$GLB,, | Performed by: NURSE PRACTITIONER

## 2023-01-21 PROCEDURE — 96372 PR INJECTION,THERAP/PROPH/DIAG2ST, IM OR SUBCUT: ICD-10-PCS | Mod: S$GLB,,, | Performed by: EMERGENCY MEDICINE

## 2023-01-21 PROCEDURE — 3078F DIAST BP <80 MM HG: CPT | Mod: CPTII,S$GLB,, | Performed by: NURSE PRACTITIONER

## 2023-01-21 RX ORDER — ONDANSETRON 4 MG/1
4 TABLET, FILM COATED ORAL EVERY 12 HOURS PRN
Qty: 20 TABLET | Refills: 0 | Status: SHIPPED | OUTPATIENT
Start: 2023-01-21 | End: 2023-06-21

## 2023-01-21 RX ORDER — NAPROXEN 500 MG/1
500 TABLET ORAL 2 TIMES DAILY WITH MEALS
Qty: 20 TABLET | Refills: 0 | Status: SHIPPED | OUTPATIENT
Start: 2023-01-21 | End: 2023-01-31

## 2023-01-21 RX ORDER — TAMSULOSIN HYDROCHLORIDE 0.4 MG/1
0.4 CAPSULE ORAL DAILY
Qty: 14 CAPSULE | Refills: 0 | Status: SHIPPED | OUTPATIENT
Start: 2023-01-21 | End: 2023-06-21

## 2023-01-21 RX ORDER — KETOROLAC TROMETHAMINE 30 MG/ML
30 INJECTION, SOLUTION INTRAMUSCULAR; INTRAVENOUS
Status: COMPLETED | OUTPATIENT
Start: 2023-01-21 | End: 2023-01-21

## 2023-01-21 RX ADMIN — KETOROLAC TROMETHAMINE 30 MG: 30 INJECTION, SOLUTION INTRAMUSCULAR; INTRAVENOUS at 10:01

## 2023-01-21 NOTE — PATIENT INSTRUCTIONS
Please strain your urine with the supplied Sieve and collect any sediment or stone material in the supplied sterile cup.    You received an injection of a powerful NSAID today (Toradol).  It's effects will last up to 24 hours. Please do not take another NSAID (ie aspirin, ibuprofen, Aleve, Advil or Motrin) until this time tomorrow.  If you continue to have pain, you may take Tylenol (acetaminophen) if you are not allergic to this medication.

## 2023-01-21 NOTE — PROGRESS NOTES
"Subjective:       Patient ID: Vesta Tucker is a 62 y.o. female.    Vitals:  height is 5' 6" (1.676 m) and weight is 69.9 kg (154 lb). Her temperature is 97.3 °F (36.3 °C). Her blood pressure is 113/70 and her pulse is 72. Her respiration is 20 and oxygen saturation is 99%.     Chief Complaint: Dysuria    62 year old female presents today with a possible UTI. Symptoms started 01/20/2023. Pelvic pain, nausea, dysuria. Treatments at home include Emetrol with no relief. History of UTI's, last episode was a few years ago.    No fever chills.  No suad hematuria.  Patient with significant history of kidney stones and states this feels very similar.  Pain is all left-sided.  Little or no CVA tenderness but there is pain just inferior to left kidney.    Dysuria   This is a recurrent problem. The current episode started yesterday. The problem has been unchanged. The quality of the pain is described as burning. The pain is at a severity of 4/10. There has been no fever. She is Not sexually active. There is No history of pyelonephritis. Associated symptoms include frequency, nausea and urgency. Her past medical history is significant for recurrent UTIs.     Gastrointestinal:  Positive for nausea.   Genitourinary:  Positive for dysuria, frequency and urgency.     Objective:      Physical Exam   Constitutional: She is oriented to person, place, and time. She appears well-developed. She is cooperative. She does not appear ill. She appears distressed.      Comments:Patient appears quite uncomfortable     HENT:   Head: Normocephalic and atraumatic.   Ears:   Right Ear: Hearing and external ear normal.   Left Ear: Hearing and external ear normal.   Nose: Nose normal. No nasal deformity. No epistaxis.   Mouth/Throat: Mucous membranes are normal.   Eyes: Lids are normal.   Neck: Trachea normal and phonation normal. Neck supple.   Cardiovascular: Normal pulses.   Pulmonary/Chest: Effort normal.   Abdominal: Normal appearance and " bowel sounds are normal. She exhibits no distension. Soft. There is no abdominal tenderness. There is no left CVA tenderness and no right CVA tenderness.   Neurological: She is alert and oriented to person, place, and time.   Skin: Skin is warm, dry and intact.   Psychiatric: Her speech is normal and behavior is normal.   Nursing note and vitals reviewed.      Results for orders placed or performed in visit on 01/21/23   POCT Urinalysis, Dipstick, Automated, W/O Scope   Result Value Ref Range    POC Blood, Urine Negative Negative    POC Bilirubin, Urine Negative Negative    POC Urobilinogen, Urine norm 0.1 - 1.1    POC Ketones, Urine Negative Negative    POC Protein, Urine Negative Negative    POC Nitrates, Urine Negative Negative    POC Glucose, Urine Negative Negative    pH, UA 8.0 5 - 8    POC Specific Gravity, Urine 1.005 1.003 - 1.029    POC Leukocytes, Urine Negative Negative     X-Ray Abdomen Flat And Erect    Result Date: 1/21/2023  EXAMINATION: XR ABDOMEN FLAT AND ERECT CLINICAL HISTORY: Dysuria TECHNIQUE: Flat and erect AP views of the abdomen were performed. COMPARISON: None FINDINGS: The lung bases are clear.  There is a 4 mm calcification overlying the right renal shadow.  Both hips are located.  The adjacent soft tissues are unremarkable.  Small calculi overlie the left renal shadow.     There is a 4 mm calcification overlying the right renal shadow possibly representing renal calculus.  Small calculi overlie the left renal shadow. Electronically signed by: Brianna Hunt MD Date:    01/21/2023 Time:    10:12     Assessment:       1. Kidney stone    2. Dysuria    3. Flank pain    4. History of kidney stones    5. Nausea          Plan:       Labs and Xrays ordered at this visit reviewed.     Kidney stone  -     naproxen (NAPROSYN) 500 MG tablet; Take 1 tablet (500 mg total) by mouth 2 (two) times daily with meals. for 10 days  Dispense: 20 tablet; Refill: 0  -     tamsulosin (FLOMAX) 0.4 mg Cap; Take  1 capsule (0.4 mg total) by mouth once daily.  Dispense: 14 capsule; Refill: 0  -     Ambulatory referral/consult to Urology    Dysuria  -     POCT Urinalysis, Dipstick, Automated, W/O Scope  -     X-Ray Abdomen Flat And Erect  -     Ambulatory referral/consult to Urology    Flank pain  -     X-Ray Abdomen Flat And Erect  -     ketorolac injection 30 mg  -     naproxen (NAPROSYN) 500 MG tablet; Take 1 tablet (500 mg total) by mouth 2 (two) times daily with meals. for 10 days  Dispense: 20 tablet; Refill: 0  -     Ambulatory referral/consult to Urology    History of kidney stones  -     ketorolac injection 30 mg  -     Ambulatory referral/consult to Urology    Nausea  -     ondansetron (ZOFRAN) 4 MG tablet; Take 1 tablet (4 mg total) by mouth every 12 (twelve) hours as needed for Nausea.  Dispense: 20 tablet; Refill: 0

## 2023-01-23 ENCOUNTER — TELEPHONE (OUTPATIENT)
Dept: FAMILY MEDICINE | Facility: CLINIC | Age: 63
End: 2023-01-23
Payer: COMMERCIAL

## 2023-01-26 ENCOUNTER — OFFICE VISIT (OUTPATIENT)
Dept: UROLOGY | Facility: CLINIC | Age: 63
End: 2023-01-26
Payer: COMMERCIAL

## 2023-01-26 ENCOUNTER — HOSPITAL ENCOUNTER (OUTPATIENT)
Dept: RADIOLOGY | Facility: HOSPITAL | Age: 63
Discharge: HOME | End: 2023-01-26
Attending: STUDENT IN AN ORGANIZED HEALTH CARE EDUCATION/TRAINING PROGRAM
Payer: COMMERCIAL

## 2023-01-26 VITALS — WEIGHT: 158.88 LBS | HEIGHT: 66 IN | BODY MASS INDEX: 25.54 KG/M2

## 2023-01-26 DIAGNOSIS — R10.9 BILATERAL FLANK PAIN: ICD-10-CM

## 2023-01-26 DIAGNOSIS — N20.0 KIDNEY STONES: ICD-10-CM

## 2023-01-26 DIAGNOSIS — N20.0 KIDNEY STONES: Primary | ICD-10-CM

## 2023-01-26 DIAGNOSIS — N31.9 NEUROGENIC BLADDER: ICD-10-CM

## 2023-01-26 PROCEDURE — 1159F PR MEDICATION LIST DOCUMENTED IN MEDICAL RECORD: ICD-10-PCS | Mod: CPTII,S$GLB,, | Performed by: STUDENT IN AN ORGANIZED HEALTH CARE EDUCATION/TRAINING PROGRAM

## 2023-01-26 PROCEDURE — 99204 PR OFFICE/OUTPT VISIT, NEW, LEVL IV, 45-59 MIN: ICD-10-PCS | Mod: S$GLB,,, | Performed by: STUDENT IN AN ORGANIZED HEALTH CARE EDUCATION/TRAINING PROGRAM

## 2023-01-26 PROCEDURE — 74176 CT RENAL STONE STUDY ABD PELVIS WO: ICD-10-PCS | Mod: 26,,, | Performed by: RADIOLOGY

## 2023-01-26 PROCEDURE — 1160F RVW MEDS BY RX/DR IN RCRD: CPT | Mod: CPTII,S$GLB,, | Performed by: STUDENT IN AN ORGANIZED HEALTH CARE EDUCATION/TRAINING PROGRAM

## 2023-01-26 PROCEDURE — 1160F PR REVIEW ALL MEDS BY PRESCRIBER/CLIN PHARMACIST DOCUMENTED: ICD-10-PCS | Mod: CPTII,S$GLB,, | Performed by: STUDENT IN AN ORGANIZED HEALTH CARE EDUCATION/TRAINING PROGRAM

## 2023-01-26 PROCEDURE — 99999 PR PBB SHADOW E&M-EST. PATIENT-LVL IV: CPT | Mod: PBBFAC,,, | Performed by: STUDENT IN AN ORGANIZED HEALTH CARE EDUCATION/TRAINING PROGRAM

## 2023-01-26 PROCEDURE — 99204 OFFICE O/P NEW MOD 45 MIN: CPT | Mod: S$GLB,,, | Performed by: STUDENT IN AN ORGANIZED HEALTH CARE EDUCATION/TRAINING PROGRAM

## 2023-01-26 PROCEDURE — 74176 CT ABD & PELVIS W/O CONTRAST: CPT | Mod: 26,,, | Performed by: RADIOLOGY

## 2023-01-26 PROCEDURE — 3008F BODY MASS INDEX DOCD: CPT | Mod: CPTII,S$GLB,, | Performed by: STUDENT IN AN ORGANIZED HEALTH CARE EDUCATION/TRAINING PROGRAM

## 2023-01-26 PROCEDURE — 3008F PR BODY MASS INDEX (BMI) DOCUMENTED: ICD-10-PCS | Mod: CPTII,S$GLB,, | Performed by: STUDENT IN AN ORGANIZED HEALTH CARE EDUCATION/TRAINING PROGRAM

## 2023-01-26 PROCEDURE — 1159F MED LIST DOCD IN RCRD: CPT | Mod: CPTII,S$GLB,, | Performed by: STUDENT IN AN ORGANIZED HEALTH CARE EDUCATION/TRAINING PROGRAM

## 2023-01-26 PROCEDURE — 99999 PR PBB SHADOW E&M-EST. PATIENT-LVL IV: ICD-10-PCS | Mod: PBBFAC,,, | Performed by: STUDENT IN AN ORGANIZED HEALTH CARE EDUCATION/TRAINING PROGRAM

## 2023-01-26 PROCEDURE — 74176 CT ABD & PELVIS W/O CONTRAST: CPT | Mod: TC,PO

## 2023-01-26 NOTE — PROGRESS NOTES
"[facility] - Urology   Clinic Note    Subjective:     Chief Complaint: Dysuria, Flank Pain, and Nephrolithiasis    History of Present Illness:  Vesta Tucker is a 62 y.o. female who presents to clinic for evaluation and management of stones and neurogenic bladder. She is new to our clinic referred by Kwan Aguilar    She reports a history of stones requiring multiple lithotripsies and ureteroscopies in the past. She began having left flank pain onset a few days ago and presented to urgent care. A KUB was done showing bilateral renal stones without definitive ureteral stone. Today she reports she has had bilateral flank pain.     She has neurogenic bladder due to a spinal cord injury and performs intermittent cath q4 hours. She has noticed increased urgency and bladder spasms in between caths but no leakage.     Past medical, family, surgical and social history reviewed as documented in chart with pertinent positive medical, family, surgical and social history detailed in HPI.    A review systems was conducted with pertinent positive and negative findings documented in HPI.    Anticoagulation/Antiplatelets:  No    Objective:     Estimated body mass index is 25.65 kg/m² as calculated from the following:    Height as of this encounter: 5' 6" (1.676 m).    Weight as of this encounter: 72.1 kg (158 lb 14.4 oz).    Vital Signs (Most Recent)       Physical Exam  Vitals and nursing note reviewed.   Constitutional:       General: She is not in acute distress.     Appearance: She is well-developed. She is not ill-appearing or toxic-appearing.   Pulmonary:      Effort: Pulmonary effort is normal. No accessory muscle usage or respiratory distress.   Neurological:      General: No focal deficit present.      Mental Status: She is alert and oriented to person, place, and time. Mental status is at baseline.   Psychiatric:         Mood and Affect: Mood normal.         Behavior: Behavior normal.         Thought Content: Thought " content normal.         Judgment: Judgment normal.       Lab Results   Component Value Date    BUN 13 01/15/2021    CREATININE 0.7 01/15/2021    WBC 7.92 01/15/2021    HGB 12.8 01/15/2021    HCT 41.2 01/15/2021     01/15/2021    AST 14 01/15/2021    ALT 13 01/15/2021    ALKPHOS 83 01/15/2021    ALBUMIN 4.0 01/15/2021      UA from 1/21/2023 reviewed showing no abnormalities    Assessment:     1. Kidney stones    2. Bilateral flank pain    3. Neurogenic bladder      Plan:     1. Kidney stones  2. Bilateral flank pain  Recommend CT RSS for further evaluation. We discussed the medical and surgical management of stones and different approaches depending on the stone size and location. We discussed flomax as an off-label option preferred for larger distal ureteral stones. I discussed shockwave lithotripsy, ureteroscopy with laser lithotripsy and stone basketing, and PCNL. I reviewed the indications and limitations of each as well as the risks and benefits to each approach. We discussed expectations and recovery times, and reviewed the stone free rates. We also discussed the possible need for further procedures regardless of approach. I discussed the possibility of needing a ureteral stent depending on the approach and the risks and morbidity associated with it.   - pending the CT we will plan for stone intervention  - CT Renal Stone Study ABD Pelvis WO; Future    3. Neurogenic bladder  Recommend urodynamics evaluation given the change in symptoms to establish a new baseline and evaluate storage pressures.   She is not interested in this time.   We will evaluate for hydro on the CT and obtain updated renal function panel prior to any surgical intervention for stones        Meng Arellano MD

## 2023-01-31 ENCOUNTER — TELEPHONE (OUTPATIENT)
Dept: UROLOGY | Facility: CLINIC | Age: 63
End: 2023-01-31
Payer: COMMERCIAL

## 2023-02-03 DIAGNOSIS — Z00.00 ROUTINE GENERAL MEDICAL EXAMINATION AT A HEALTH CARE FACILITY: ICD-10-CM

## 2023-02-03 NOTE — TELEPHONE ENCOUNTER
Care Due:                  Date            Visit Type   Department     Provider  --------------------------------------------------------------------------------                                EP -                              PRIMARY      [facility] FAMILY  Last Visit: 09-      CARE ([facility])   MEDICINE       Shaina Rasmussen  Next Visit: None Scheduled  None         None Found                                                            Last  Test          Frequency    Reason                     Performed    Due Date  --------------------------------------------------------------------------------    CMP.........  12 months..  famotidine,                Not Found    Overdue                             hydroCHLOROthiazide......    [employer] Embedded Care Gaps. Reference number: [id number]. 2/03/2023   3:54:34 PM CST

## 2023-02-06 RX ORDER — CYCLOBENZAPRINE HCL 10 MG
TABLET ORAL
Qty: 90 TABLET | Refills: 1 | Status: SHIPPED | OUTPATIENT
Start: 2023-02-06 | End: 2023-06-23 | Stop reason: SDUPTHER

## 2023-04-11 ENCOUNTER — PATIENT MESSAGE (OUTPATIENT)
Dept: ADMINISTRATIVE | Facility: HOSPITAL | Age: 63
End: 2023-04-11
Payer: COMMERCIAL

## 2023-06-21 ENCOUNTER — HOSPITAL ENCOUNTER (OUTPATIENT)
Dept: RADIOLOGY | Facility: HOSPITAL | Age: 63
Discharge: HOME | End: 2023-06-21
Attending: INTERNAL MEDICINE
Payer: COMMERCIAL

## 2023-06-21 ENCOUNTER — OFFICE VISIT (OUTPATIENT)
Dept: FAMILY MEDICINE | Facility: CLINIC | Age: 63
End: 2023-06-21
Payer: COMMERCIAL

## 2023-06-21 VITALS
BODY MASS INDEX: 25.9 KG/M2 | OXYGEN SATURATION: 96 % | SYSTOLIC BLOOD PRESSURE: 104 MMHG | DIASTOLIC BLOOD PRESSURE: 70 MMHG | HEIGHT: 66 IN | HEART RATE: 72 BPM | WEIGHT: 161.19 LBS

## 2023-06-21 DIAGNOSIS — K51.00 ULCERATIVE PANCOLITIS WITHOUT COMPLICATION: ICD-10-CM

## 2023-06-21 DIAGNOSIS — Z00.00 PREVENTATIVE HEALTH CARE: ICD-10-CM

## 2023-06-21 DIAGNOSIS — F98.8 ATTENTION DEFICIT DISORDER, UNSPECIFIED HYPERACTIVITY PRESENCE: ICD-10-CM

## 2023-06-21 DIAGNOSIS — Z00.00 PREVENTATIVE HEALTH CARE: Primary | ICD-10-CM

## 2023-06-21 DIAGNOSIS — N20.0 RECURRENT NEPHROLITHIASIS: ICD-10-CM

## 2023-06-21 PROCEDURE — 88141 PR  CYTOPATH CERV/VAG INTERPRET: ICD-10-PCS | Mod: ,,, | Performed by: PATHOLOGY

## 2023-06-21 PROCEDURE — 77067 SCR MAMMO BI INCL CAD: CPT | Mod: TC,PO

## 2023-06-21 PROCEDURE — 88141 CYTOPATH C/V INTERPRET: CPT | Mod: ,,, | Performed by: PATHOLOGY

## 2023-06-21 PROCEDURE — 90715 TDAP VACCINE GREATER THAN OR EQUAL TO 7YO IM: ICD-10-PCS | Mod: S$GLB,,, | Performed by: INTERNAL MEDICINE

## 2023-06-21 PROCEDURE — 1159F MED LIST DOCD IN RCRD: CPT | Mod: CPTII,S$GLB,, | Performed by: INTERNAL MEDICINE

## 2023-06-21 PROCEDURE — 3078F DIAST BP <80 MM HG: CPT | Mod: CPTII,S$GLB,, | Performed by: INTERNAL MEDICINE

## 2023-06-21 PROCEDURE — 90471 TDAP VACCINE GREATER THAN OR EQUAL TO 7YO IM: ICD-10-PCS | Mod: S$GLB,,, | Performed by: INTERNAL MEDICINE

## 2023-06-21 PROCEDURE — 77067 SCR MAMMO BI INCL CAD: CPT | Mod: 26,,, | Performed by: RADIOLOGY

## 2023-06-21 PROCEDURE — 87624 HPV HI-RISK TYP POOLED RSLT: CPT | Performed by: INTERNAL MEDICINE

## 2023-06-21 PROCEDURE — 77063 MAMMO DIGITAL SCREENING BILAT WITH TOMO: ICD-10-PCS | Mod: 26,,, | Performed by: RADIOLOGY

## 2023-06-21 PROCEDURE — 77063 BREAST TOMOSYNTHESIS BI: CPT | Mod: 26,,, | Performed by: RADIOLOGY

## 2023-06-21 PROCEDURE — 1159F PR MEDICATION LIST DOCUMENTED IN MEDICAL RECORD: ICD-10-PCS | Mod: CPTII,S$GLB,, | Performed by: INTERNAL MEDICINE

## 2023-06-21 PROCEDURE — 99999 PR PBB SHADOW E&M-EST. PATIENT-LVL IV: ICD-10-PCS | Mod: PBBFAC,,, | Performed by: INTERNAL MEDICINE

## 2023-06-21 PROCEDURE — 3008F PR BODY MASS INDEX (BMI) DOCUMENTED: ICD-10-PCS | Mod: CPTII,S$GLB,, | Performed by: INTERNAL MEDICINE

## 2023-06-21 PROCEDURE — 3008F BODY MASS INDEX DOCD: CPT | Mod: CPTII,S$GLB,, | Performed by: INTERNAL MEDICINE

## 2023-06-21 PROCEDURE — 99999 PR PBB SHADOW E&M-EST. PATIENT-LVL IV: CPT | Mod: PBBFAC,,, | Performed by: INTERNAL MEDICINE

## 2023-06-21 PROCEDURE — 99396 PR PREVENTIVE VISIT,EST,40-64: ICD-10-PCS | Mod: 25,S$GLB,, | Performed by: INTERNAL MEDICINE

## 2023-06-21 PROCEDURE — 1160F RVW MEDS BY RX/DR IN RCRD: CPT | Mod: CPTII,S$GLB,, | Performed by: INTERNAL MEDICINE

## 2023-06-21 PROCEDURE — 90471 IMMUNIZATION ADMIN: CPT | Mod: S$GLB,,, | Performed by: INTERNAL MEDICINE

## 2023-06-21 PROCEDURE — 3074F SYST BP LT 130 MM HG: CPT | Mod: CPTII,S$GLB,, | Performed by: INTERNAL MEDICINE

## 2023-06-21 PROCEDURE — 3078F PR MOST RECENT DIASTOLIC BLOOD PRESSURE < 80 MM HG: ICD-10-PCS | Mod: CPTII,S$GLB,, | Performed by: INTERNAL MEDICINE

## 2023-06-21 PROCEDURE — 1160F PR REVIEW ALL MEDS BY PRESCRIBER/CLIN PHARMACIST DOCUMENTED: ICD-10-PCS | Mod: CPTII,S$GLB,, | Performed by: INTERNAL MEDICINE

## 2023-06-21 PROCEDURE — 3074F PR MOST RECENT SYSTOLIC BLOOD PRESSURE < 130 MM HG: ICD-10-PCS | Mod: CPTII,S$GLB,, | Performed by: INTERNAL MEDICINE

## 2023-06-21 PROCEDURE — 90715 TDAP VACCINE 7 YRS/> IM: CPT | Mod: S$GLB,,, | Performed by: INTERNAL MEDICINE

## 2023-06-21 PROCEDURE — 99396 PREV VISIT EST AGE 40-64: CPT | Mod: 25,S$GLB,, | Performed by: INTERNAL MEDICINE

## 2023-06-21 PROCEDURE — 77067 MAMMO DIGITAL SCREENING BILAT WITH TOMO: ICD-10-PCS | Mod: 26,,, | Performed by: RADIOLOGY

## 2023-06-21 PROCEDURE — 88175 CYTOPATH C/V AUTO FLUID REDO: CPT | Performed by: PATHOLOGY

## 2023-06-21 RX ORDER — MESALAMINE 0.38 G/1
1.5 CAPSULE, EXTENDED RELEASE ORAL DAILY
Qty: 120 CAPSULE | Refills: 0 | Status: SHIPPED | OUTPATIENT
Start: 2023-06-21 | End: 2023-09-11 | Stop reason: SDUPTHER

## 2023-06-21 RX ORDER — DEXTROAMPHETAMINE SACCHARATE, AMPHETAMINE ASPARTATE, DEXTROAMPHETAMINE SULFATE AND AMPHETAMINE SULFATE 2.5; 2.5; 2.5; 2.5 MG/1; MG/1; MG/1; MG/1
10 TABLET ORAL 2 TIMES DAILY
Qty: 60 TABLET | Refills: 0 | Status: SHIPPED | OUTPATIENT
Start: 2023-06-21 | End: 2023-09-11 | Stop reason: SDUPTHER

## 2023-06-21 RX ORDER — HYDROCHLOROTHIAZIDE 12.5 MG/1
12.5 CAPSULE ORAL DAILY
Qty: 90 CAPSULE | Refills: 1 | Status: SHIPPED | OUTPATIENT
Start: 2023-06-21 | End: 2023-09-11 | Stop reason: SDUPTHER
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
## 2023-06-22 RX ORDER — FAMOTIDINE 40 MG/1
TABLET, FILM COATED ORAL
Qty: 180 TABLET | Refills: 1 | Status: SHIPPED | OUTPATIENT
Start: 2023-06-22 | End: 2024-01-12

## 2023-06-22 NOTE — TELEPHONE ENCOUNTER
No care due was identified.  [facility] Embedded Care Due Messages. Reference number: [id number].   6/22/2023 3:27:00 PM CDT

## 2023-06-22 NOTE — TELEPHONE ENCOUNTER
Refill Routing Note   Medication(s) are not appropriate for processing by [facility] for the following reason(s):      No active prescription written by PCP    [facility] action(s):  Defer None identified          Appointments  past 12m or future 3m with PCP    Date Provider   Last Visit   9/27/2022 Shaina Rasmussen MD   Next Visit   Visit date not found Shaina Rasmussen MD   ED visits in past 90 days: 0        Note composed:4:55 PM 06/22/2023

## 2023-06-23 DIAGNOSIS — Z00.00 ROUTINE GENERAL MEDICAL EXAMINATION AT A HEALTH CARE FACILITY: ICD-10-CM

## 2023-06-23 RX ORDER — CYCLOBENZAPRINE HCL 10 MG
TABLET ORAL
Qty: 90 TABLET | Refills: 1 | OUTPATIENT
Start: 2023-06-23

## 2023-06-23 NOTE — TELEPHONE ENCOUNTER
No care due was identified.  [facility] Embedded Care Due Messages. Reference number: [id number].   6/23/2023 3:13:42 PM CDT

## 2023-06-23 NOTE — TELEPHONE ENCOUNTER
----- Message from Rani Zuniga sent at 6/23/2023  3:46 PM CDT -----  Contact: pharmacy  Type: Needs Medical Advice  Who Called:  pharmacy     Pharmacy name and phone #:    [facility] [place] - [phone] [place] [id number]  Phone: [phone] Fax: [phone]      Additional Information: pharmacy states they requested a refill for pt cyclobenzaprine (FLEXERIL) 10 MG tablet and it was denied stating it was too soon for a refill but the last refill was 02/2023. Please advise.

## 2023-06-26 RX ORDER — CYCLOBENZAPRINE HCL 10 MG
10 TABLET ORAL 3 TIMES DAILY PRN
Qty: 90 TABLET | Refills: 1 | Status: SHIPPED | OUTPATIENT
Start: 2023-06-26 | End: 2023-09-29

## 2023-06-28 LAB
HPV HR 12 DNA SPEC QL NAA+PROBE: NEGATIVE
HPV16 AG SPEC QL: NEGATIVE
HPV18 DNA SPEC QL NAA+PROBE: NEGATIVE

## 2023-06-29 LAB
FINAL PATHOLOGIC DIAGNOSIS: ABNORMAL
Lab: ABNORMAL

## 2023-09-11 DIAGNOSIS — N20.0 RECURRENT NEPHROLITHIASIS: ICD-10-CM

## 2023-09-11 DIAGNOSIS — F98.8 ATTENTION DEFICIT DISORDER, UNSPECIFIED HYPERACTIVITY PRESENCE: ICD-10-CM

## 2023-09-11 DIAGNOSIS — K51.00 ULCERATIVE PANCOLITIS WITHOUT COMPLICATION: ICD-10-CM

## 2023-09-11 NOTE — TELEPHONE ENCOUNTER
No care due was identified.  [facility] Embedded Care Due Messages. Reference number: [id number].   9/11/2023 3:39:07 PM CDT

## 2023-09-13 RX ORDER — DEXTROAMPHETAMINE SACCHARATE, AMPHETAMINE ASPARTATE, DEXTROAMPHETAMINE SULFATE AND AMPHETAMINE SULFATE 2.5; 2.5; 2.5; 2.5 MG/1; MG/1; MG/1; MG/1
10 TABLET ORAL 2 TIMES DAILY
Qty: 60 TABLET | Refills: 0 | Status: SHIPPED | OUTPATIENT
Start: 2023-09-13 | End: 2024-02-28 | Stop reason: SDUPTHER

## 2023-09-13 RX ORDER — MESALAMINE 0.38 G/1
1.5 CAPSULE, EXTENDED RELEASE ORAL DAILY
Qty: 120 CAPSULE | Refills: 0 | Status: SHIPPED | OUTPATIENT
Start: 2023-09-13 | End: 2023-11-28

## 2023-09-13 RX ORDER — HYDROCHLOROTHIAZIDE 12.5 MG/1
12.5 CAPSULE ORAL DAILY
Qty: 90 CAPSULE | Refills: 1 | Status: SHIPPED | OUTPATIENT
Start: 2023-09-13

## 2023-11-17 DIAGNOSIS — Z00.00 ROUTINE GENERAL MEDICAL EXAMINATION AT A HEALTH CARE FACILITY: ICD-10-CM

## 2023-11-17 DIAGNOSIS — F98.8 ATTENTION DEFICIT DISORDER, UNSPECIFIED HYPERACTIVITY PRESENCE: ICD-10-CM

## 2023-11-17 NOTE — TELEPHONE ENCOUNTER
Care Due:                  Date            Visit Type   Department     Provider  --------------------------------------------------------------------------------                                MYCHART                              FOLLOWUP/OF  [facility] FAMILY  Last Visit: 06-      [facility] VISIT   MEDICINE       Nina Gibbs  Next Visit: None Scheduled  None         None Found                                                            Last  Test          Frequency    Reason                     Performed    Due Date  --------------------------------------------------------------------------------    Office Visit  6 months...  methotrexate.............  06- 12-    CBC.........  3 months...  methotrexate.............  06- 09-    CMP.........  3 months...  methotrexate.............  06- 09-    [employer] Embedded Care Due Messages. Reference number: [id number].   11/17/2023 3:38:49 PM CST

## 2023-11-17 NOTE — TELEPHONE ENCOUNTER
No care due was identified.  [facility] Embedded Care Due Messages. Reference number: [id number].   11/17/2023 3:39:11 PM CST

## 2023-11-24 RX ORDER — CYCLOBENZAPRINE HCL 10 MG
TABLET ORAL
Qty: 90 TABLET | Refills: 1 | Status: SHIPPED | OUTPATIENT
Start: 2023-11-24 | End: 2024-02-14

## 2023-11-24 RX ORDER — DEXTROAMPHETAMINE SACCHARATE, AMPHETAMINE ASPARTATE, DEXTROAMPHETAMINE SULFATE AND AMPHETAMINE SULFATE 2.5; 2.5; 2.5; 2.5 MG/1; MG/1; MG/1; MG/1
TABLET ORAL
Qty: 60 TABLET | Refills: 0 | OUTPATIENT
Start: 2023-11-24

## 2023-11-27 DIAGNOSIS — K51.00 ULCERATIVE PANCOLITIS WITHOUT COMPLICATION: ICD-10-CM

## 2023-11-27 NOTE — TELEPHONE ENCOUNTER
Please approve for mesalamine    Last OV 06/21/2023  Last refill date 9/13/2023  Last labs 6/21/2023

## 2023-11-27 NOTE — TELEPHONE ENCOUNTER
No care due was identified.  [facility] Embedded Care Due Messages. Reference number: [id number].   11/27/2023 3:40:44 PM CST

## 2023-11-28 RX ORDER — MESALAMINE 0.38 G/1
1.5 CAPSULE, EXTENDED RELEASE ORAL DAILY
Qty: 120 CAPSULE | Refills: 0 | Status: SHIPPED | OUTPATIENT
Start: 2023-11-28 | End: 2024-01-12

## 2024-01-09 DIAGNOSIS — K51.00 ULCERATIVE PANCOLITIS WITHOUT COMPLICATION: ICD-10-CM

## 2024-01-09 NOTE — TELEPHONE ENCOUNTER
No care due was identified.  [facility] Embedded Care Due Messages. Reference number: [id number].   1/09/2024 3:25:46 PM CST

## 2024-01-10 NOTE — TELEPHONE ENCOUNTER
Refill Routing Note   Medication(s) are not appropriate for processing by [facility] for the following reason(s):        Outside of protocol  Patient not seen by provider within 15 months    [facility] action(s):  Defer  Route               Appointments  past 12m or future 3m with PCP    Date Provider   Last Visit   9/27/2022 Shaina Rasmussen MD   Next Visit   Visit date not found Shaina Rasmussen MD   ED visits in past 90 days: 0        Note composed:10:53 AM 01/10/2024

## 2024-01-12 RX ORDER — MESALAMINE 0.38 G/1
1.5 CAPSULE, EXTENDED RELEASE ORAL DAILY
Qty: 120 CAPSULE | Refills: 1 | Status: SHIPPED | OUTPATIENT
Start: 2024-01-12

## 2024-01-12 RX ORDER — FAMOTIDINE 40 MG/1
TABLET, FILM COATED ORAL
Qty: 180 TABLET | Refills: 1 | Status: SHIPPED | OUTPATIENT
Start: 2024-01-12

## 2024-02-12 DIAGNOSIS — Z00.00 ROUTINE GENERAL MEDICAL EXAMINATION AT A HEALTH CARE FACILITY: ICD-10-CM

## 2024-02-12 NOTE — TELEPHONE ENCOUNTER
Care Due:                  Date            Visit Type   Department     Provider  --------------------------------------------------------------------------------                                MYCHART                              FOLLOWUP/OF  [facility] FAMILY  Last Visit: 06-      [facility] VISIT   MEDICINE       Nina Gibbs  Next Visit: None Scheduled  None         None Found                                                            Last  Test          Frequency    Reason                     Performed    Due Date  --------------------------------------------------------------------------------    Office Visit  6 months...  methotrexate.............  06- 12-    CBC.........  3 months...  methotrexate.............  06- 09-    CMP.........  3 months...  methotrexate.............  06- 09-    [employer] Embedded Care Due Messages. Reference number: [id number].   2/12/2024 3:22:47 PM CST

## 2024-02-12 NOTE — TELEPHONE ENCOUNTER
Please approve for cyclobenzaprine (FLEXERIL) 10 MG     Last OV 06/21/2023  Last refill date 11/24/23  Last labs ---  90x1r  Next appt --

## 2024-02-12 NOTE — TELEPHONE ENCOUNTER
Refill Routing Note   Medication(s) are not appropriate for processing by [facility] for the following reason(s):        Outside of protocol    [facility] action(s):  Route               Appointments  past 12m or future 3m with PCP    Date Provider   Last Visit   9/27/2022 Shaina Rasmussen MD   Next Visit   2/12/2024 Shaina Rasmussen MD   ED visits in past 90 days: 0        Note composed:5:07 PM 02/12/2024

## 2024-02-14 RX ORDER — CYCLOBENZAPRINE HCL 10 MG
10 TABLET ORAL
Qty: 90 TABLET | Refills: 1 | Status: SHIPPED | OUTPATIENT
Start: 2024-02-14 | End: 2024-05-02

## 2024-02-28 ENCOUNTER — OFFICE VISIT (OUTPATIENT)
Dept: FAMILY MEDICINE | Facility: CLINIC | Age: 64
End: 2024-02-28
Payer: COMMERCIAL

## 2024-02-28 VITALS
BODY MASS INDEX: 25.83 KG/M2 | DIASTOLIC BLOOD PRESSURE: 58 MMHG | HEART RATE: 82 BPM | OXYGEN SATURATION: 96 % | HEIGHT: 66 IN | SYSTOLIC BLOOD PRESSURE: 106 MMHG | WEIGHT: 160.69 LBS

## 2024-02-28 DIAGNOSIS — R09.81 NASAL SINUS CONGESTION: ICD-10-CM

## 2024-02-28 DIAGNOSIS — F98.8 ATTENTION DEFICIT DISORDER, UNSPECIFIED HYPERACTIVITY PRESENCE: ICD-10-CM

## 2024-02-28 DIAGNOSIS — H65.93 OTITIS MEDIA WITH EFFUSION, BILATERAL: Primary | ICD-10-CM

## 2024-02-28 DIAGNOSIS — H92.03 EAR PAIN, BILATERAL: ICD-10-CM

## 2024-02-28 PROCEDURE — 99214 OFFICE O/P EST MOD 30 MIN: CPT | Mod: S$GLB,,, | Performed by: NURSE PRACTITIONER

## 2024-02-28 PROCEDURE — 1160F RVW MEDS BY RX/DR IN RCRD: CPT | Mod: CPTII,S$GLB,, | Performed by: NURSE PRACTITIONER

## 2024-02-28 PROCEDURE — 99999 PR PBB SHADOW E&M-EST. PATIENT-LVL IV: CPT | Mod: PBBFAC,,, | Performed by: NURSE PRACTITIONER

## 2024-02-28 PROCEDURE — 3078F DIAST BP <80 MM HG: CPT | Mod: CPTII,S$GLB,, | Performed by: NURSE PRACTITIONER

## 2024-02-28 PROCEDURE — 1159F MED LIST DOCD IN RCRD: CPT | Mod: CPTII,S$GLB,, | Performed by: NURSE PRACTITIONER

## 2024-02-28 PROCEDURE — 3074F SYST BP LT 130 MM HG: CPT | Mod: CPTII,S$GLB,, | Performed by: NURSE PRACTITIONER

## 2024-02-28 PROCEDURE — 3008F BODY MASS INDEX DOCD: CPT | Mod: CPTII,S$GLB,, | Performed by: NURSE PRACTITIONER

## 2024-02-28 RX ORDER — FLUTICASONE PROPIONATE 50 MCG
1 SPRAY, SUSPENSION (ML) NASAL DAILY
Qty: 18.2 ML | Refills: 0 | Status: SHIPPED | OUTPATIENT
Start: 2024-02-28 | End: 2024-06-06 | Stop reason: SDUPTHER

## 2024-02-28 RX ORDER — DEXTROAMPHETAMINE SACCHARATE, AMPHETAMINE ASPARTATE, DEXTROAMPHETAMINE SULFATE AND AMPHETAMINE SULFATE 2.5; 2.5; 2.5; 2.5 MG/1; MG/1; MG/1; MG/1
10 TABLET ORAL 2 TIMES DAILY
Qty: 60 TABLET | Refills: 0 | Status: SHIPPED | OUTPATIENT
Start: 2024-02-28 | End: 2024-06-06 | Stop reason: SDUPTHER

## 2024-02-28 RX ORDER — DOXYCYCLINE 100 MG/1
100 CAPSULE ORAL 2 TIMES DAILY
Qty: 14 CAPSULE | Refills: 0 | Status: SHIPPED | OUTPATIENT
Start: 2024-02-28 | End: 2024-03-06
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
## 2024-03-12 ENCOUNTER — OFFICE VISIT (OUTPATIENT)
Dept: PAIN MEDICINE | Facility: CLINIC | Age: 64
End: 2024-03-12
Payer: COMMERCIAL

## 2024-03-12 VITALS
SYSTOLIC BLOOD PRESSURE: 126 MMHG | WEIGHT: 161.19 LBS | BODY MASS INDEX: 26.01 KG/M2 | DIASTOLIC BLOOD PRESSURE: 65 MMHG | HEART RATE: 73 BPM

## 2024-03-12 DIAGNOSIS — M54.14 THORACIC RADICULOPATHY: ICD-10-CM

## 2024-03-12 DIAGNOSIS — M54.6 ACUTE LEFT-SIDED THORACIC BACK PAIN: Primary | ICD-10-CM

## 2024-03-12 PROCEDURE — 3078F DIAST BP <80 MM HG: CPT | Mod: CPTII,S$GLB,, | Performed by: PHYSICIAN ASSISTANT

## 2024-03-12 PROCEDURE — 1160F RVW MEDS BY RX/DR IN RCRD: CPT | Mod: CPTII,S$GLB,, | Performed by: PHYSICIAN ASSISTANT

## 2024-03-12 PROCEDURE — 99203 OFFICE O/P NEW LOW 30 MIN: CPT | Mod: S$GLB,,, | Performed by: PHYSICIAN ASSISTANT

## 2024-03-12 PROCEDURE — 1159F MED LIST DOCD IN RCRD: CPT | Mod: CPTII,S$GLB,, | Performed by: PHYSICIAN ASSISTANT

## 2024-03-12 PROCEDURE — 3074F SYST BP LT 130 MM HG: CPT | Mod: CPTII,S$GLB,, | Performed by: PHYSICIAN ASSISTANT

## 2024-03-12 PROCEDURE — 3008F BODY MASS INDEX DOCD: CPT | Mod: CPTII,S$GLB,, | Performed by: PHYSICIAN ASSISTANT

## 2024-03-12 PROCEDURE — 99999 PR PBB SHADOW E&M-EST. PATIENT-LVL IV: CPT | Mod: PBBFAC,,, | Performed by: PHYSICIAN ASSISTANT

## 2024-03-12 RX ORDER — METHYLPREDNISOLONE 4 MG/1
TABLET ORAL
Qty: 1 EACH | Refills: 0 | Status: SHIPPED | OUTPATIENT
Start: 2024-03-12 | End: 2024-04-02
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
## 2024-03-13 ENCOUNTER — CLINICAL SUPPORT (OUTPATIENT)
Dept: REHABILITATION | Facility: HOSPITAL | Age: 64
End: 2024-03-13
Payer: COMMERCIAL

## 2024-03-13 DIAGNOSIS — M54.6 ACUTE LEFT-SIDED THORACIC BACK PAIN: ICD-10-CM

## 2024-03-13 DIAGNOSIS — M53.84 DECREASED ROM OF THORACIC SPINE: Primary | ICD-10-CM

## 2024-03-13 DIAGNOSIS — M54.14 THORACIC RADICULOPATHY: ICD-10-CM

## 2024-03-13 DIAGNOSIS — Z74.09 IMPAIRED FUNCTIONAL MOBILITY AND ACTIVITY TOLERANCE: ICD-10-CM

## 2024-03-13 PROCEDURE — 97110 THERAPEUTIC EXERCISES: CPT | Mod: PN

## 2024-03-13 PROCEDURE — 97140 MANUAL THERAPY 1/> REGIONS: CPT | Mod: PN

## 2024-03-13 PROCEDURE — 97162 PT EVAL MOD COMPLEX 30 MIN: CPT | Mod: PN

## 2024-03-16 PROBLEM — M54.6 ACUTE LEFT-SIDED THORACIC BACK PAIN: Status: ACTIVE | Noted: 2024-03-16

## 2024-03-16 PROBLEM — Z74.09 IMPAIRED FUNCTIONAL MOBILITY AND ACTIVITY TOLERANCE: Status: ACTIVE | Noted: 2024-03-16

## 2024-03-16 PROBLEM — M53.84 DECREASED ROM OF THORACIC SPINE: Status: ACTIVE | Noted: 2024-03-16
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
## 2024-03-25 DIAGNOSIS — K51.00 ULCERATIVE PANCOLITIS WITHOUT COMPLICATION: ICD-10-CM

## 2024-03-25 NOTE — TELEPHONE ENCOUNTER
No care due was identified.  [facility] Embedded Care Due Messages. Reference number: [id number].   3/25/2024 12:05:57 PM CDT

## 2024-03-25 NOTE — PROGRESS NOTES
[facility]   Physical Therapy Treatment Note      Name: Vesta Tucker  Clinic Number: [id number]    Therapy Diagnosis:   Encounter Diagnoses   Name Primary?    Decreased ROM of thoracic spine Yes    Acute left-sided thoracic back pain     Impaired functional mobility and activity tolerance      Physician: Rachael Laws PA-C    Visit Date: 3/26/2024    Physician Orders: PT Eval and Treat   Medical Diagnosis from Referral:   M54.6 (ICD-10-CM) - Acute left-sided thoracic back pain   M54.14 (ICD-10-CM) - Thoracic radiculopathy      Evaluation Date: 3/13/2024  Authorization Period Expiration: 12/21/2024  Plan of Care Expiration: 5/20/2024  Visit # / Visits authorized: 1/ 20 +eval  FOTO Due visit 5  FOTO Due visit 10       Time In: 0822   Time Out: 0906   Total Time: 44 minutes  Total Billable Time: 44 minutes     Precautions: Standard and dons AFOs bilaterally, 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 Gets forewarning of upward flow of blood feeling from thoracic spine and face flush sensation and is able to sense forewarning of AD.    PTA Visit #: 1/5       Subjective     Patient reports: Soreness after last session. No pain. Home exercise program once daily. Lifts boxes of cans, sometimes 50# approx waist height. Reports she has had in the past: autonomic dysreflexia, most triggered with left trunk rotation and hip positions. Gets forewarning of upward flow of blood feeling from thoracic spine and face flush sensation and is able to sense forewarning of AD.  .  She was compliant with home exercise program.  Response to previous treatment: soreness and now pain-free  Functional change: none stated     Pain: 0/10  Location: bilateral thoracic spine    Objective      Objective Measures updated at progress report unless specified.     Treatment     Vesta received the treatments listed below:      Vesta  received therapeutic exercises to develop ROM, flexibility, and posture for 10 minutes including:  Supine AAROM left shoulder with T bar 10 x 10s  NP-Seated lumbar flexion with ball x 10  Standing QL stretch with sidebending 5 x 10s  Scapular retraction x 15      Neuromuscular re-education x 19 minutes to improve muscle activation and control:  Prone scap depression 1 x 10  Prone lower trap lifts offs  1 x 10  Wall walk bilaterally with lift off x 8  No money x 10     Vesta participated in dynamic functional therapeutic activities to improve functional performance for 15 minutes, including:  Lifting waist height  and carry 12.5# box with cues for proper body mechanics for work related tasks        Vesta received the following manual therapy techniques: Joint mobilizations were applied to the: thoracic for 00 minutes, including:     CPA of T7-12 grade II-III          Patient Education and Home Exercises       Education provided:   - Educated pt that he/she may feel soreness after session.    - Notify therapist of forewarning of AD symptoms    Written Home Exercises Provided: yes.   Exercises were reviewed and Vesta was able to demonstrate them prior to the end of the session.  Vesta demonstrated good  understanding of the education provided. See Electronic Medical Record under Patient Instructions for exercises provided during therapy sessions    Assessment     Soreness after last session but improved pain and AROM bilateral shoulders, so progressed strengthening and stabilization with manual cues for muscle activation and control. Good response without pain provocation.  Will benefit from continued physical therapy intervention to progress strength, ROM, and activity tolerance, and decreased pain, toward goals set forth in plan of care to improve functional mobility and quality of life.     Vesta Is progressing well towards her goals.   Patient prognosis is Fair.     Patient will continue to benefit from skilled  outpatient physical therapy to address the deficits listed in the problem list box on initial evaluation, provide pt/family education and to maximize pt's level of independence in the home and community environment.     Patient's spiritual, cultural and educational needs considered and pt agreeable to plan of care and goals.     Anticipated barriers to physical therapy: co-morbidities     Goals:   Short Term Goals: 4 weeks ongoing  1.Report decreased in pain at worse less than  <   / =  5/10  to increase tolerance for functional activities   2. Pt to improve Back range of motion by 25% to allow for improved functional mobility to allow for improvement in IADLs.   3. Increased  BUE MMT 1/2  grade to increase tolerance for ADL and work activities.  4. Pt to report standing tolerance improved to 1-2 hours prior to requiring rest break.  5. Pt to tolerate HEP to improve ROM and independence with ADL's  6. Increased MMT for lower traps/middle traps/rhomboids to > or = 4-/5 to increase tolerance for ADL and improve posture.     Long Term Goals: 8 weeks ongoing  1.Report decreased in pain at worse less than  <   / =  2 /10  to increase tolerance for functional activities   2.Pt to improve range of motion by 50% to allow for improved functional mobility to allow for improvement in IADLs.   3.Increased BUE MMT 1 grade  to increase tolerance for ADL and work activities.  4.  Pt will report return to appropriate workout routine to maintain functional abilities.  5. Increased MMT  for lower traps/middle traps/rhomboids to > or = 4/5 to increase tolerance for ADL and improve posture.       Plan     Continue per POC, progressing as appropriate to achieve stated goals.    Continue with: Plan of care Certification: 3/13/2024 to 5/20/2024.     Outpatient Physical Therapy 2 times weekly for 8 weeks to include the following interventions: Manual Therapy, Moist Heat/ Ice, Neuromuscular Re-ed, Patient Education, Therapeutic  Activities, and Therapeutic Exercise.     Alfreda Yoder, PTA

## 2024-03-25 NOTE — TELEPHONE ENCOUNTER
Refill Routing Note   Medication(s) are not appropriate for processing by [facility] for the following reason(s):        Outside of protocol    [facility] action(s):  Route               Appointments  past 12m or future 3m with PCP    Date Provider   Last Visit   9/27/2022 Shaina Rasmussen MD   Next Visit   6/6/2024 Shaina Rasmussen MD   ED visits in past 90 days: 0        Note composed:12:45 PM 03/25/2024

## 2024-03-26 ENCOUNTER — CLINICAL SUPPORT (OUTPATIENT)
Dept: REHABILITATION | Facility: HOSPITAL | Age: 64
End: 2024-03-26
Payer: COMMERCIAL

## 2024-03-26 DIAGNOSIS — M53.84 DECREASED ROM OF THORACIC SPINE: Primary | ICD-10-CM

## 2024-03-26 DIAGNOSIS — Z74.09 IMPAIRED FUNCTIONAL MOBILITY AND ACTIVITY TOLERANCE: ICD-10-CM

## 2024-03-26 DIAGNOSIS — M54.6 ACUTE LEFT-SIDED THORACIC BACK PAIN: ICD-10-CM

## 2024-03-26 PROCEDURE — 97530 THERAPEUTIC ACTIVITIES: CPT | Mod: PN,CQ

## 2024-03-26 PROCEDURE — 97110 THERAPEUTIC EXERCISES: CPT | Mod: PN,CQ

## 2024-03-26 PROCEDURE — 97112 NEUROMUSCULAR REEDUCATION: CPT | Mod: PN,CQ

## 2024-03-26 RX ORDER — MESALAMINE 0.38 G/1
1.5 CAPSULE, EXTENDED RELEASE ORAL DAILY
Qty: 120 CAPSULE | Refills: 1 | Status: SHIPPED | OUTPATIENT
Start: 2024-03-26 | End: 2024-04-15

## 2024-03-27 NOTE — PROGRESS NOTES
[facility]   Physical Therapy Treatment Note      Name: Vesta Tucker  Clinic Number: [id number]    Therapy Diagnosis:   Encounter Diagnoses   Name Primary?    Decreased ROM of thoracic spine Yes    Acute left-sided thoracic back pain     Impaired functional mobility and activity tolerance        Physician: Rachael Laws PA-C    Visit Date: 3/28/2024    Physician Orders: PT Eval and Treat   Medical Diagnosis from Referral:   M54.6 (ICD-10-CM) - Acute left-sided thoracic back pain   M54.14 (ICD-10-CM) - Thoracic radiculopathy      Evaluation Date: 3/13/2024  Authorization Period Expiration: 12/21/2024  Plan of Care Expiration: 5/20/2024  Visit # / Visits authorized: 2/ 20 +eval  FOTO Due visit 5  FOTO Due visit 10       Time In: 0648   Time Out: 0734   Total Time: 46 minutes  Total Billable Time: 46 minutes     Precautions: Standard and dons AFOs bilaterally, 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 Gets forewarning of upward flow of blood feeling from thoracic spine and face flush sensation and is able to sense forewarning of AD.    PTA Visit #: 2/5       Subjective     Patient reports: soreness after last session but no pain.   .  She was compliant with home exercise program once daily.  Response to previous treatment: soreness and now pain-free  Functional change: none stated     Pain: 0/10  Location: bilateral thoracic spine    Objective      Objective Measures updated at progress report unless specified.     Treatment     Bold = exercises performed  + = progressions    Vesta received the treatments listed below:      Vesta received therapeutic exercises to develop ROM, flexibility, and posture for 12 minutes including:  Supine AAROM left shoulder with T bar 10 x 10s  Seated lumbar flexion with ball x 10  Standing QL stretch with sidebending 5 x 10s  Rows, yellow tubing 2 x 10    Neuromuscular  re-education x 26 minutes to improve muscle activation and control:  Prone scap depression 20  Prone lower trap lifts offs  2 x 10  Wall walk bilaterally with lift off x 15  No money 2 x 10     Vesta participated in dynamic functional therapeutic activities to improve functional performance for 00 minutes, including:  Lifting waist height  and carry 12.5# box with cues for proper body mechanics for work related tasks        Vesta received the following manual therapy techniques: Joint mobilizations were applied to the: thoracic for 08 minutes, including:  STM bilateral thoracic paraspinals  NP-CPA of T7-12 grade II-III          Patient Education and Home Exercises       Education provided:   - Educated pt that he/she may feel soreness after session.    - Notify therapist of forewarning of AD symptoms    Written Home Exercises Provided: Instructed patient to continue current home exercise program.  Exercises were reviewed and Vesta was able to demonstrate them prior to the end of the session.  Vesta demonstrated good  understanding of the education provided. See Electronic Medical Record under Patient Instructions for exercises provided during therapy sessions    Assessment     Improved pain but soreness after last session. Increased left shoulder AROM flexion but painful at end range. Progressed strengthening with manual cues for scapular activation to prevent UT and low back compensations. Tenderness on palpation and tightness right>left thoracic paraspinals which improved with manual therapy. Pain 3/10 lower thoracic spine after session. Reminded pt to stop any activity that in creases pain and to notify therapist of pain. Will benefit from continued physical therapy intervention to progress strength, ROM, and activity tolerance, and decreased pain, toward goals set forth in plan of care to improve functional mobility and quality of life.     Vesta Is progressing well towards her goals.   Patient prognosis is Fair.      Patient will continue to benefit from skilled outpatient physical therapy to address the deficits listed in the problem list box on initial evaluation, provide pt/family education and to maximize pt's level of independence in the home and community environment.     Patient's spiritual, cultural and educational needs considered and pt agreeable to plan of care and goals.     Anticipated barriers to physical therapy: co-morbidities     Goals:   Short Term Goals: 4 weeks ongoing  1.Report decreased in pain at worse less than  <   / =  5/10  to increase tolerance for functional activities   2. Pt to improve Back range of motion by 25% to allow for improved functional mobility to allow for improvement in IADLs.   3. Increased  BUE MMT 1/2  grade to increase tolerance for ADL and work activities.  4. Pt to report standing tolerance improved to 1-2 hours prior to requiring rest break.  5. Pt to tolerate HEP to improve ROM and independence with ADL's  6. Increased MMT for lower traps/middle traps/rhomboids to > or = 4-/5 to increase tolerance for ADL and improve posture.     Long Term Goals: 8 weeks ongoing  1.Report decreased in pain at worse less than  <   / =  2 /10  to increase tolerance for functional activities   2.Pt to improve range of motion by 50% to allow for improved functional mobility to allow for improvement in IADLs.   3.Increased BUE MMT 1 grade  to increase tolerance for ADL and work activities.  4.  Pt will report return to appropriate workout routine to maintain functional abilities.  5. Increased MMT  for lower traps/middle traps/rhomboids to > or = 4/5 to increase tolerance for ADL and improve posture.       Plan     Continue per POC, progressing as appropriate to achieve stated goals.    Continue with: Plan of care Certification: 3/13/2024 to 5/20/2024.     Outpatient Physical Therapy 2 times weekly for 8 weeks to include the following interventions: Manual Therapy, Moist Heat/ Ice,  Neuromuscular Re-ed, Patient Education, Therapeutic Activities, and Therapeutic Exercise.     Alfreda Yoder, PTA

## 2024-03-28 ENCOUNTER — CLINICAL SUPPORT (OUTPATIENT)
Dept: REHABILITATION | Facility: HOSPITAL | Age: 64
End: 2024-03-28
Payer: COMMERCIAL

## 2024-03-28 DIAGNOSIS — M53.84 DECREASED ROM OF THORACIC SPINE: Primary | ICD-10-CM

## 2024-03-28 DIAGNOSIS — Z74.09 IMPAIRED FUNCTIONAL MOBILITY AND ACTIVITY TOLERANCE: ICD-10-CM

## 2024-03-28 DIAGNOSIS — M54.6 ACUTE LEFT-SIDED THORACIC BACK PAIN: ICD-10-CM

## 2024-03-28 PROCEDURE — 97112 NEUROMUSCULAR REEDUCATION: CPT | Mod: PN,CQ

## 2024-03-28 PROCEDURE — 97110 THERAPEUTIC EXERCISES: CPT | Mod: PN,CQ

## 2024-03-28 PROCEDURE — 97140 MANUAL THERAPY 1/> REGIONS: CPT | Mod: PN,CQ

## 2024-04-03 ENCOUNTER — CLINICAL SUPPORT (OUTPATIENT)
Dept: REHABILITATION | Facility: HOSPITAL | Age: 64
End: 2024-04-03
Payer: COMMERCIAL

## 2024-04-03 DIAGNOSIS — M53.84 DECREASED ROM OF THORACIC SPINE: Primary | ICD-10-CM

## 2024-04-03 DIAGNOSIS — M54.6 ACUTE LEFT-SIDED THORACIC BACK PAIN: ICD-10-CM

## 2024-04-03 DIAGNOSIS — Z74.09 IMPAIRED FUNCTIONAL MOBILITY AND ACTIVITY TOLERANCE: ICD-10-CM

## 2024-04-03 PROCEDURE — 97112 NEUROMUSCULAR REEDUCATION: CPT | Mod: PN,CQ

## 2024-04-03 PROCEDURE — 97110 THERAPEUTIC EXERCISES: CPT | Mod: PN,CQ

## 2024-04-03 NOTE — PROGRESS NOTES
"[facility]   Physical Therapy Treatment Note      Name: Vesta Tucker  Clinic Number: [id number]    Therapy Diagnosis:   Encounter Diagnoses   Name Primary?    Decreased ROM of thoracic spine Yes    Acute left-sided thoracic back pain     Impaired functional mobility and activity tolerance          Physician: Rachael Laws PA-C    Visit Date: 4/3/2024    Physician Orders: PT Eval and Treat   Medical Diagnosis from Referral:   M54.6 (ICD-10-CM) - Acute left-sided thoracic back pain   M54.14 (ICD-10-CM) - Thoracic radiculopathy      Evaluation Date: 3/13/2024  Authorization Period Expiration: 12/21/2024  Plan of Care Expiration: 5/20/2024  Visit # / Visits authorized: 3/ 20 +eval  FOTO Due visit 5  FOTO Due visit 10       Time In: 0736 (late arrival)   Time Out: 0825   Total Time: 49 minutes  Total Billable Time: 45 minutes     Precautions: Standard and dons AFOs bilaterally, 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 Gets forewarning of upward flow of blood feeling from thoracic spine and face flush sensation and is able to sense forewarning of AD.    PTA Visit #: 3/5       Subjective     Patient reports: last pain was during sleep at middle lower thoracic spine. "Not enough to wake you up, but it's there."   .  She was compliant with home exercise program once daily.  Response to previous treatment: soreness and now pain-free  Functional change: none stated     Pain: 0/10  Location: bilateral thoracic spine    Objective      Objective Measures updated at progress report unless specified.     Treatment     Bold = exercises performed  + = progressions    Vesta received the treatments listed below:      Vesta received therapeutic exercises to develop ROM, flexibility, and posture for 13 minutes 1:1 supervision including:  Supine AAROM left shoulder with T bar 10 x 10s (PROM/AAROM manually initially " for ROM and comfort)  Seated lumbar flexion with ball x 10  Standing QL stretch with sidebending 5 x 10s  +Rows, green tubing 2 x 10    Neuromuscular re-education x 32 minutes to improve muscle activation and control:  Prone scap depression 20  Prone rows 2 x 10  +Prone T x 10  +Prone shoulder extension 3 x 10 left  +Prone lower trap lifts offs  3 x 10  +Supine horizontal abduction, red band 2 x 10  + Wall walk bilaterally with lift off 2 x 15  No money 2 x 10   Serratus punch, supine 1#, 2 x 10    Vesta participated in dynamic functional therapeutic activities to improve functional performance for 00 minutes, including:  Lifting waist height  and carry 12.5# box with cues for proper body mechanics for work related tasks        Vesta received the following manual therapy techniques: Joint mobilizations were applied to the: thoracic for 00 minutes, including:  STM bilateral thoracic paraspinals  NP-CPA of T7-12 grade II-III          Patient Education and Home Exercises       Education provided:   - Educated pt that he/she may feel soreness after session.    - Notify therapist of forewarning of AD symptoms    Written Home Exercises Provided: Instructed patient to continue current home exercise program.  Exercises were reviewed and Vesta was able to demonstrate them prior to the end of the session.  Vesta demonstrated good  understanding of the education provided. See Electronic Medical Record under Patient Instructions for exercises provided during therapy sessions    Assessment     Continued improvements in pain other than her chronic sleeping pain since the injury. Initial discomfort with AAROM with T bar which resolved after manual PROM/AAROM. Progressing with strengthening and stabilization without further exacerbation of symptoms. Manual cues for increased scapular activation and prevention of compensations with good corrections. Will benefit from continued physical therapy intervention to progress strength, ROM, and  activity tolerance, and decreased pain, toward goals set forth in plan of care to improve functional mobility and quality of life.     Vesta Is progressing well towards her goals.   Patient prognosis is Fair.     Patient will continue to benefit from skilled outpatient physical therapy to address the deficits listed in the problem list box on initial evaluation, provide pt/family education and to maximize pt's level of independence in the home and community environment.     Patient's spiritual, cultural and educational needs considered and pt agreeable to plan of care and goals.     Anticipated barriers to physical therapy: co-morbidities     Goals:   Short Term Goals: 4 weeks ongoing  1.Report decreased in pain at worse less than  <   / =  5/10  to increase tolerance for functional activities   2. Pt to improve Back range of motion by 25% to allow for improved functional mobility to allow for improvement in IADLs.   3. Increased  BUE MMT 1/2  grade to increase tolerance for ADL and work activities.  4. Pt to report standing tolerance improved to 1-2 hours prior to requiring rest break.  5. Pt to tolerate HEP to improve ROM and independence with ADL's  6. Increased MMT for lower traps/middle traps/rhomboids to > or = 4-/5 to increase tolerance for ADL and improve posture.     Long Term Goals: 8 weeks ongoing  1.Report decreased in pain at worse less than  <   / =  2 /10  to increase tolerance for functional activities   2.Pt to improve range of motion by 50% to allow for improved functional mobility to allow for improvement in IADLs.   3.Increased BUE MMT 1 grade  to increase tolerance for ADL and work activities.  4.  Pt will report return to appropriate workout routine to maintain functional abilities.  5. Increased MMT  for lower traps/middle traps/rhomboids to > or = 4/5 to increase tolerance for ADL and improve posture.       Plan     Continue per POC, progressing as appropriate to achieve stated  goals.    Continue with: Plan of care Certification: 3/13/2024 to 5/20/2024.     Outpatient Physical Therapy 2 times weekly for 8 weeks to include the following interventions: Manual Therapy, Moist Heat/ Ice, Neuromuscular Re-ed, Patient Education, Therapeutic Activities, and Therapeutic Exercise.     Alfreda Yoder, PTA

## 2024-04-05 ENCOUNTER — CLINICAL SUPPORT (OUTPATIENT)
Dept: REHABILITATION | Facility: HOSPITAL | Age: 64
End: 2024-04-05
Payer: COMMERCIAL

## 2024-04-05 DIAGNOSIS — M53.84 DECREASED ROM OF THORACIC SPINE: Primary | ICD-10-CM

## 2024-04-05 DIAGNOSIS — Z74.09 IMPAIRED FUNCTIONAL MOBILITY AND ACTIVITY TOLERANCE: ICD-10-CM

## 2024-04-05 DIAGNOSIS — M54.6 ACUTE LEFT-SIDED THORACIC BACK PAIN: ICD-10-CM

## 2024-04-05 PROCEDURE — 97140 MANUAL THERAPY 1/> REGIONS: CPT | Mod: PN

## 2024-04-05 PROCEDURE — 97112 NEUROMUSCULAR REEDUCATION: CPT | Mod: PN

## 2024-04-05 NOTE — PROGRESS NOTES
[facility]   Physical Therapy Treatment Note      Name: Vesta Tucker  Clinic Number: [id number]    Therapy Diagnosis:   Encounter Diagnoses   Name Primary?    Decreased ROM of thoracic spine Yes    Acute left-sided thoracic back pain     Impaired functional mobility and activity tolerance      Physician: Rachael Laws PA-C    Visit Date: 4/5/2024    Physician Orders: PT Eval and Treat   Medical Diagnosis from Referral:   M54.6 (ICD-10-CM) - Acute left-sided thoracic back pain   M54.14 (ICD-10-CM) - Thoracic radiculopathy      Evaluation Date: 3/13/2024  Authorization Period Expiration: 12/21/2024  Plan of Care Expiration: 5/20/2024  Visit # / Visits authorized: 5/ 20  FOTO Due visit 5  FOTO Due visit 10    Time In: 6:55am  Time Out: 7:35am  Total Time: 40 minutes     Precautions: Standard and dons AFOs bilaterally, 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 Gets forewarning of upward flow of blood feeling from thoracic spine and face flush sensation and is able to sense forewarning of AD.    PTA Visit #: 3/5     Subjective     She was compliant with home exercise program once daily.  Response to previous treatment: feels symptoms at area of shoulder blades and into L flank   Function:  continued limitation with daily routine     Pain: 0/10  Location: bilateral thoracic spine    Objective      Objective Measures updated at progress report unless specified.     Treatment     Bold = exercises performed  + = progressions    Vesta received therapeutic exercises to develop ROM, flexibility, and posture for 6 minutes including:  Supine AAROM left shoulder with T bar 10 x 10s (PROM/AAROM manually initially for ROM and comfort)  Seated lumbar flexion with ball x 10  Standing QL stretch with sidebending 5 x 10s  Rows, green tubing, 3 x 10    Neuromuscular re-education x 25 minutes to improve muscle  activation and control:  Prone scap depression 20  Prone rows 2 x 10  +Prone T x 10  +Prone shoulder extension 3 x 10 left  +Prone lower trap lifts offs  3 x 10  Supine horizontal abduction, red band 2 x 10   Wall walk bilaterally with lift off 2 x 15  No money 2 x 10   Serratus punch, supine 1#, 2 x 10    Vesta participated in dynamic functional therapeutic activities to improve functional performance for 00 minutes, including:  Lifting waist height  and carry 12.5# box with cues for proper body mechanics for work related tasks     Vesta received the following manual therapy techniques: Joint mobilizations were applied to the: thoracic for 9 minutes, including:  STM bilateral thoracic paraspinals  +L thoracic rib inhalation mobilization in sitting  +Prone thoracic PAIVMs, Gr II/III- JM    Patient Education and Home Exercises       Education provided:   - Educated pt that he/she may feel soreness after session.    - Notify therapist of forewarning of AD symptoms    Home Exercises Provided: Instructed patient to continue current home exercise program.  Exercises were reviewed and Vesta was able to demonstrate them prior to the end of the session.  Vesta demonstrated good  understanding of the education provided. See Electronic Medical Record under Patient Instructions for exercises provided during therapy sessions    Assessment   Audible and palpable click with implementation of L rib inhalation JM with patient report of decreased symptoms with inhalation following this technique. Will benefit from continued physical therapy intervention to progress strength, ROM, and activity tolerance, and decreased pain, toward goals set forth in plan of care to improve functional mobility and quality of life.     Vesta is making good progress towards meeting set goals.    Patient prognosis is Fair.   Rehab potential: fair     Patient will continue to benefit from skilled outpatient physical therapy to address the deficits listed in  the problem list box on initial evaluation, provide pt/family education and to maximize pt's level of independence in the home and community environment.     Patient's spiritual, cultural and educational needs considered and pt agreeable to plan of care and goals.     Anticipated barriers to physical therapy: co-morbidities     Goals:   Short Term Goals: 4 weeks ongoing  1.Report decreased in pain at worse less than  <   / =  5/10  to increase tolerance for functional activities   2. Pt to improve Back range of motion by 25% to allow for improved functional mobility to allow for improvement in IADLs.   3. Increased  BUE MMT 1/2  grade to increase tolerance for ADL and work activities.  4. Pt to report standing tolerance improved to 1-2 hours prior to requiring rest break.  5. Pt to tolerate HEP to improve ROM and independence with ADL's  6. Increased MMT for lower traps/middle traps/rhomboids to > or = 4-/5 to increase tolerance for ADL and improve posture.     Long Term Goals: 8 weeks ongoing  1.Report decreased in pain at worse less than  <   / =  2 /10  to increase tolerance for functional activities   2.Pt to improve range of motion by 50% to allow for improved functional mobility to allow for improvement in IADLs.   3.Increased BUE MMT 1 grade  to increase tolerance for ADL and work activities.  4.  Pt will report return to appropriate workout routine to maintain functional abilities.  5. Increased MMT  for lower traps/middle traps/rhomboids to > or = 4/5 to increase tolerance for ADL and improve posture.       Plan     Continue per POC, progressing as appropriate to achieve stated goals.    Continue with: Plan of care Certification: 3/13/2024 to 5/20/2024.     Outpatient Physical Therapy 2 times weekly for 8 weeks to include the following interventions: Manual Therapy, Moist Heat/ Ice, Neuromuscular Re-ed, Patient Education, Therapeutic Activities, and Therapeutic Exercise.     Radha Ricks, PT

## 2024-04-09 NOTE — PROGRESS NOTES
[facility]   Physical Therapy Treatment Note      Name: Vesta Tucker  Clinic Number: [id number]    Therapy Diagnosis:   Encounter Diagnoses   Name Primary?    Decreased ROM of thoracic spine Yes    Acute left-sided thoracic back pain     Impaired functional mobility and activity tolerance        Physician: Rachael Laws PA-C    Visit Date: 4/10/2024    Physician Orders: PT Eval and Treat   Medical Diagnosis from Referral:   M54.6 (ICD-10-CM) - Acute left-sided thoracic back pain   M54.14 (ICD-10-CM) - Thoracic radiculopathy      Evaluation Date: 3/13/2024  Authorization Period Expiration: 12/21/2024  Plan of Care Expiration: 5/20/2024  Visit # / Visits authorized: 6/ 20  FOTO Due visit 5  FOTO Due visit 10    Time In: 0730   Time Out: 0830   Total Time: 60 minutes  Total Billable Time: 55 minutes     Precautions: Standard and dons AFOs bilaterally, 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 Gets forewarning of upward flow of blood feeling from thoracic spine and face flush sensation, pressure in her ears, and is able to sense forewarning of AD.    PTA Visit #: 1/5     Subjective     She was mostly compliant with home exercise program once daily. Stooped when her pain increased.  Response to previous treatment: feels symptoms at area of shoulder blades and into L flank   Function:  continued limitation with daily routine     Soreness since last session from posterior left ribs to anterior 4/10 at rest and 5/10 with certain activities.     Pain: 4/10  Location: bilateral thoracic spine    Objective      Objective Measures updated at progress report unless specified.     Treatment     Bold = exercises performed  + = progressions    Vesta received therapeutic exercises to develop ROM, flexibility, and posture for 15 minutes including:  Supine AAROM left shoulder with T bar 10 x 10s (PROM/AAROM  manually initially for ROM and comfort)  Seated lumbar flexion with ball x 10  Standing QL stretch with sidebending 5 x 10s  Rows, green tubing, 3 x 10  +Shoulder extension, yellow tubing, palms forward 2 x 10  +Upper back stretch (rhomboid stretch-closed hands) 3 x 10s    May add: UBE    Neuromuscular re-education x 25 minutes to improve muscle activation and control:  Prone scap depression 20  Prone rows, +1# 3 x 10  Prone T +2 x 10  Prone shoulder extension 3 x 10 left  Prone lower trap lifts offs  3 x 10  Supine horizontal abduction, red band 2 x 10  Wall walk bilaterally with lift off 2 x 15  No money +3 x 10   Serratus punch, supine 1#, +3 x 10  Diaphragmatic breathing with knees on leg elevator x 2 minutes: 4s inhale, 4s hold, 8s exhale    Vesta participated in dynamic functional therapeutic activities to improve functional performance for 15 minutes, including:  Lifting waist height  and carry 12.5# box with cues for proper body mechanics for work related tasks  Education on diaphragmatic breathing for management of symptoms  Education on safe BP parameters for exercise and activity and seeking medical attention      Vesta received the following manual therapy techniques: Joint mobilizations were applied to the: thoracic for 00 minutes, including:  STM bilateral thoracic paraspinals  L thoracic rib inhalation mobilization in sitting  Prone thoracic PAIVMs, Gr II/III- JM    Patient Education and Home Exercises       Education provided:   - Educated pt that he/she may feel soreness after session.    - Notify therapist of forewarning of AD symptoms    Home Exercises Provided: Instructed patient to continue current home exercise program.  Exercises were reviewed and Vesta was able to demonstrate them prior to the end of the session.  Vesta demonstrated good  understanding of the education provided. See Electronic Medical Record under Patient Instructions for exercises provided during therapy sessions    Assessment      Continued left rib area discomfort wrapping around to anterior chest, and hypersensitive to all palpation, causing diaphragmatic spasms per pt report. Able to perform exercises with improvement in soreness. Limited manual cues at mid to lower thoracic musculature with exercises to prevent spasms, and instead cues for decreased shrug and manual cues at anterior shoulder for scapular retraction and depression. Strict instruction to perform exercises and activities in pain-free ranges only and to stop any exercise or activity that causes increased symptoms. Will benefit from continued physical therapy intervention to progress strength, ROM, and activity tolerance, and decreased pain, toward goals set forth in plan of care to improve functional mobility and quality of life.     Vesta is making good progress towards meeting set goals.    Patient prognosis is Fair.   Rehab potential: fair     Patient will continue to benefit from skilled outpatient physical therapy to address the deficits listed in the problem list box on initial evaluation, provide pt/family education and to maximize pt's level of independence in the home and community environment.     Patient's spiritual, cultural and educational needs considered and pt agreeable to plan of care and goals.     Anticipated barriers to physical therapy: co-morbidities     Goals:   Short Term Goals: 4 weeks ongoing  1.Report decreased in pain at worse less than  <   / =  5/10  to increase tolerance for functional activities   2. Pt to improve Back range of motion by 25% to allow for improved functional mobility to allow for improvement in IADLs.   3. Increased  BUE MMT 1/2  grade to increase tolerance for ADL and work activities.  4. Pt to report standing tolerance improved to 1-2 hours prior to requiring rest break.  5. Pt to tolerate HEP to improve ROM and independence with ADL's  6. Increased MMT for lower traps/middle traps/rhomboids to > or = 4-/5 to increase  tolerance for ADL and improve posture.     Long Term Goals: 8 weeks ongoing  1.Report decreased in pain at worse less than  <   / =  2 /10  to increase tolerance for functional activities   2.Pt to improve range of motion by 50% to allow for improved functional mobility to allow for improvement in IADLs.   3.Increased BUE MMT 1 grade  to increase tolerance for ADL and work activities.  4.  Pt will report return to appropriate workout routine to maintain functional abilities.  5. Increased MMT  for lower traps/middle traps/rhomboids to > or = 4/5 to increase tolerance for ADL and improve posture.       Plan     Continue per POC, progressing as appropriate to achieve stated goals.    Continue with: Plan of care Certification: 3/13/2024 to 5/20/2024.     Outpatient Physical Therapy 2 times weekly for 8 weeks to include the following interventions: Manual Therapy, Moist Heat/ Ice, Neuromuscular Re-ed, Patient Education, Therapeutic Activities, and Therapeutic Exercise.     Alfreda Yoder, PTA

## 2024-04-10 ENCOUNTER — CLINICAL SUPPORT (OUTPATIENT)
Dept: REHABILITATION | Facility: HOSPITAL | Age: 64
End: 2024-04-10
Payer: COMMERCIAL

## 2024-04-10 DIAGNOSIS — M53.84 DECREASED ROM OF THORACIC SPINE: Primary | ICD-10-CM

## 2024-04-10 DIAGNOSIS — Z74.09 IMPAIRED FUNCTIONAL MOBILITY AND ACTIVITY TOLERANCE: ICD-10-CM

## 2024-04-10 DIAGNOSIS — M54.6 ACUTE LEFT-SIDED THORACIC BACK PAIN: ICD-10-CM

## 2024-04-10 PROCEDURE — 97112 NEUROMUSCULAR REEDUCATION: CPT | Mod: PN,CQ

## 2024-04-10 PROCEDURE — 97110 THERAPEUTIC EXERCISES: CPT | Mod: PN,CQ

## 2024-04-10 PROCEDURE — 97530 THERAPEUTIC ACTIVITIES: CPT | Mod: PN,CQ

## 2024-04-13 DIAGNOSIS — N20.0 RECURRENT NEPHROLITHIASIS: ICD-10-CM

## 2024-04-13 DIAGNOSIS — K51.00 ULCERATIVE PANCOLITIS WITHOUT COMPLICATION: ICD-10-CM

## 2024-04-13 NOTE — TELEPHONE ENCOUNTER
No care due was identified.  [facility] Embedded Care Due Messages. Reference number: [id number].   4/13/2024 10:29:14 AM CDT

## 2024-04-13 NOTE — TELEPHONE ENCOUNTER
Care Due:                  Date            Visit Type   Department     Provider  --------------------------------------------------------------------------------                                MYCHART                              FOLLOWUP/OF  [facility] FAM[facility]  Last Visit: 06-      FICE VISIT   MEDICINE       Nina Gibbs                              EP -                              PRIMARY      [facility]  Next Visit: 06-      CARE ([facility])   MEDICINE       Shaina Rasmussen                                                            Last  Test          Frequency    Reason                     Performed    Due Date  --------------------------------------------------------------------------------    CBC.........  3 months...  mesalamine, methotrexate.  06- 09-    CMP.........  3 months...  hydroCHLOROthiazide,       06- 09-                             mesalamine, methotrexate.    [employer] Embedded Care Due Messages. Reference number: [id number].   4/13/2024 10:28:47 AM CDT

## 2024-04-15 ENCOUNTER — CLINICAL SUPPORT (OUTPATIENT)
Dept: REHABILITATION | Facility: HOSPITAL | Age: 64
End: 2024-04-15
Payer: COMMERCIAL

## 2024-04-15 DIAGNOSIS — Z74.09 IMPAIRED FUNCTIONAL MOBILITY AND ACTIVITY TOLERANCE: ICD-10-CM

## 2024-04-15 DIAGNOSIS — M54.6 ACUTE LEFT-SIDED THORACIC BACK PAIN: ICD-10-CM

## 2024-04-15 DIAGNOSIS — M53.84 DECREASED ROM OF THORACIC SPINE: Primary | ICD-10-CM

## 2024-04-15 PROCEDURE — 97110 THERAPEUTIC EXERCISES: CPT | Mod: PN

## 2024-04-15 PROCEDURE — 97112 NEUROMUSCULAR REEDUCATION: CPT | Mod: PN

## 2024-04-15 RX ORDER — HYDROCHLOROTHIAZIDE 12.5 MG/1
CAPSULE ORAL
Qty: 90 CAPSULE | Refills: 0 | Status: SHIPPED | OUTPATIENT
Start: 2024-04-15 | End: 2024-06-06 | Stop reason: SDUPTHER

## 2024-04-15 RX ORDER — MESALAMINE 0.38 G/1
1.5 CAPSULE, EXTENDED RELEASE ORAL DAILY
Qty: 120 CAPSULE | Refills: 1 | Status: SHIPPED | OUTPATIENT
Start: 2024-04-15 | End: 2024-05-30

## 2024-04-15 NOTE — TELEPHONE ENCOUNTER
Refill Routing Note   Medication(s) are not appropriate for processing by [facility] for the following reason(s):        Patient not seen by provider within 15 months    [facility] action(s):  Defer   Requires labs : Yes             Appointments  past 12m or future 3m with PCP    Date Provider   Last Visit   9/27/2022 Shaina Rasmussen MD   Next Visit   6/6/2024 Shaina Rasmussen MD   ED visits in past 90 days: 0        Note composed:8:45 AM 04/15/2024

## 2024-04-15 NOTE — TELEPHONE ENCOUNTER
Refill Routing Note   Medication(s) are not appropriate for processing by [facility] for the following reason(s):        Outside of protocol    [facility] action(s):  Route               Appointments  past 12m or future 3m with PCP    Date Provider   Last Visit   9/27/2022 Shaina Rasmussen MD   Next Visit   6/6/2024 Shaina Rasmussen MD   ED visits in past 90 days: 0        Note composed:8:48 AM 04/15/2024

## 2024-04-15 NOTE — PROGRESS NOTES
[facility]   Physical Therapy Treatment Note      Name: Vesta Tucker  Clinic Number: [id number]    Therapy Diagnosis:   Encounter Diagnoses   Name Primary?    Decreased ROM of thoracic spine Yes    Acute left-sided thoracic back pain     Impaired functional mobility and activity tolerance      Physician: Rachael Laws PA-C    Visit Date: 4/26/2024    Physician Orders: PT Eval and Treat   Medical Diagnosis from Referral:   M54.6 (ICD-10-CM) - Acute left-sided thoracic back pain   M54.14 (ICD-10-CM) - Thoracic radiculopathy      Evaluation Date: 3/13/2024  Authorization Period Expiration: 12/21/2024  Plan of Care Expiration: 5/20/2024    Visit # / Visits authorized: 8/ 20  FOTO #2/3  Time In: 6:46am  Time Out: 7:48am  Total Time: 62 minutes     Precautions: Standard and dons AFOs bilaterally, 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 Gets forewarning of upward flow of blood feeling from thoracic spine and face flush sensation, pressure in her ears, and is able to sense forewarning of AD.    PTA Visit #: 1/5     Subjective   HOME EXERCISE PROGRAM: reviewed, reports compliance   Response to previous treatment: mostly feels symptoms at area of shoulder blades bilaterally   Function:  continued limitation with daily routine inclusive of heavy lifting >#50 boxes from low points     Pain: 4/10  Location: bilateral thoracic spine    Objective      Observation: donning bilateral AFOs     Posture Alignment: increased lordosis; elevation of right shoulder     Sensation: Light touch: Impaired: anterior lower leg bilaterally     GAIT DEVIATIONS: Vesta ambulates with bilateral AFO and displays wide base of support decreased knee flexion in swing phase bilaterally     Cervical ROM: WFL for flexion, extension, B rotation  AROM 75% for bilateral sidebend with stretching to UT     Lumbar Range of Motion:     AROM  "% Pain   Flexion 75% stretch      Extension 25% Yes and difficulty transitioning from flexion to neutral position without UE support      Right Side Bending 75% Y to L side      Left Side Bending 50% Y with radicular symptoms from L thoracic to chest      Right Rotation 25% Y to L   Left Rotation 25% Y, worse than R rotation with pain to L side      Shoulder Range of Motion:   Shoulder Left Right   Flexion 120* 165   Abduction 125* 165   ER Occiput  C7   IR T12 T10      Upper Extremity Strength  (R) UE   (L) UE     Shoulder elevation: 5/5 Shoulder elevation: 5/5   Shoulder flexion: 4-/5 Shoulder flexion: 3/5*   Shoulder Abduction: 4-/5 Shoulder abduction: 3/5*   Shoulder ER 4/5 Shoulder ER 4/5   Shoulder IR 4+/5 Shoulder IR 4+/5   Lower Trap 3/5 Lower Trap 3-/5*   Middle Trap 3+/5 Middle Trap 3-/5   Rhomboids 3+/5 Rhomboids 3+/5*         Joint Mobility: hypomobility to mid thoracic spine with PA    Treatment     Bold = exercises performed  + = progressions    Therapeutic exercises to develop ROM, flexibility, and posture for 45 minutes including:  Supine AAROM left shoulder with T bar 10 x 10s (PROM/AAROM manually initially for ROM and comfort)  Seated lumbar flexion with ball x 10  Standing QL stretch with sidebending 5 x 10s  Rows, green tubing, 3 x 10  Shoulder extension, green tubing, palms forward, 3 x 10  Seated thoracic ext with half foam, 10"x10  Cat/camel in quadruped x 15   Standing thoracic rotation at wall, 5" hold, x 15 each side   Wall deb, blue half foam along spine at wall, 2x15   Sitting horizontal abduction, green band, 5" hold, 3x10    Neuromuscular re-education x 12 minutes to improve muscle activation and control:  Prone scap depression 20  Prone rows, +1# 3 x 10  Prone T +2 x 10  Prone shoulder extension 3 x 10 left  Prone lower trap lifts offs  3 x 10  Supine horizontal abduction, red band 2 x 10  Wall walk bilaterally with lift off 2 x 15  No money, +red band, 3" hold away, 3 x 10 "   Serratus punch, supine 3#, 2x20  Diaphragmatic breathing with knees on leg elevator x 2 minutes: 4s inhale, 4s hold, 8s exhale    Functional therapeutic activities to improve functional performance for 5 minutes, including:  Lifting waist height  and carry 12.5# box with cues for proper body mechanics for work related tasks  Education on diaphragmatic breathing for management of symptoms  Education on safe BP parameters for exercise and activity and seeking medical attention   UBE, retro, 1.5 resistance, x 5'     Manual therapy techniques: Joint mobilizations were applied to the: thoracic for 00 minutes, including:  STM bilateral thoracic paraspinals  L thoracic rib inhalation mobilization in sitting  Prone thoracic PAIVMs, Gr II/III- JM    Patient Education and Home Exercises       Education provided:   - Educated pt that he/she may feel soreness after session.    - Notify therapist of forewarning of AD symptoms    Home Exercises Provided: Instructed patient to continue current home exercise program.  Exercises were reviewed and Vesta was able to demonstrate them prior to the end of the session.  Vesta demonstrated good  understanding of the education provided. See Electronic Medical Record under Patient Instructions for exercises provided during therapy sessions    Assessment   Patient presents with improving thoracic mobility and tolerance for exercise intensity progressions. She will benefit from continued physical therapy intervention to progress strength, ROM, and activity tolerance, and decreased pain, toward goals set forth in plan of care to improve functional mobility and quality of life.     Vesta is making good progress towards meeting set goals.    Patient prognosis is Fair.   Rehab potential: fair     Patient will continue to benefit from skilled outpatient physical therapy to address the deficits listed in the problem list box on initial evaluation, provide pt/family education and to maximize pt's  level of independence in the home and community environment.     Patient's spiritual, cultural and educational needs considered and pt agreeable to plan of care and goals.     Anticipated barriers to physical therapy: co-morbidities     Goals:   Short Term Goals: 4 weeks ongoing  1.Report decreased in pain at worse less than  <   / =  5/10  to increase tolerance for functional activities   2. Pt to improve Back range of motion by 25% to allow for improved functional mobility to allow for improvement in IADLs.   3. Increased  BUE MMT 1/2  grade to increase tolerance for ADL and work activities.  4. Pt to report standing tolerance improved to 1-2 hours prior to requiring rest break.  5. Pt to tolerate HEP to improve ROM and independence with ADL's  6. Increased MMT for lower traps/middle traps/rhomboids to > or = 4-/5 to increase tolerance for ADL and improve posture.     Long Term Goals: 8 weeks ongoing  1.Report decreased in pain at worse less than  <   / =  2 /10  to increase tolerance for functional activities   2.Pt to improve range of motion by 50% to allow for improved functional mobility to allow for improvement in IADLs.   3.Increased BUE MMT 1 grade  to increase tolerance for ADL and work activities.  4.  Pt will report return to appropriate workout routine to maintain functional abilities.  5. Increased MMT  for lower traps/middle traps/rhomboids to > or = 4/5 to increase tolerance for ADL and improve posture.       Plan     Continue per POC, progressing as appropriate to achieve stated goals.    Continue with: Plan of care Certification: 3/13/2024 to 5/20/2024.     Outpatient Physical Therapy 2 times weekly for 8 weeks to include the following interventions: Manual Therapy, Moist Heat/ Ice, Neuromuscular Re-ed, Patient Education, Therapeutic Activities, and Therapeutic Exercise.     Radha Ricks, PT

## 2024-04-15 NOTE — PROGRESS NOTES
[facility]   Physical Therapy Treatment Note      Name: Vesta Tucker  Clinic Number: [id number]    Therapy Diagnosis:   Encounter Diagnoses   Name Primary?    Decreased ROM of thoracic spine Yes    Acute left-sided thoracic back pain     Impaired functional mobility and activity tolerance      Physician: Rachael Laws PA-C    Visit Date: 4/15/2024    Physician Orders: PT Eval and Treat   Medical Diagnosis from Referral:   M54.6 (ICD-10-CM) - Acute left-sided thoracic back pain   M54.14 (ICD-10-CM) - Thoracic radiculopathy      Evaluation Date: 3/13/2024  Authorization Period Expiration: 12/21/2024  Plan of Care Expiration: 5/20/2024    Visit # / Visits authorized: 7/ 20  FOTO #2/3  Time In: 8:24am  Time Out: 9:13am   Total Time: 49 minutes     Precautions: Standard and dons AFOs bilaterally, 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 Gets forewarning of upward flow of blood feeling from thoracic spine and face flush sensation, pressure in her ears, and is able to sense forewarning of AD.    PTA Visit #: 1/5     Subjective   HOME EXERCISE PROGRAM: reviewed, reports compliance   Response to previous treatment: mostly feels symptoms at area of shoulder blades bilaterally   Function:  continued limitation with daily routine inclusive of heavy lifting >#50 boxes from low points     Pain: 4/10  Location: bilateral thoracic spine    Objective      Objective Measures updated at progress report unless specified.     Treatment     Bold = exercises performed  + = progressions    Vesta received therapeutic exercises to develop ROM, flexibility, and posture for 29 minutes including:  Supine AAROM left shoulder with T bar 10 x 10s (PROM/AAROM manually initially for ROM and comfort)  Seated lumbar flexion with ball x 10  Standing QL stretch with sidebending 5 x 10s  Rows, green tubing, 3 x 10  Shoulder  "extension, green tubing, palms forward, 3 x 10  +Seated thoracic ext with half foam, 10"x10  +Cat/camel in quadruped x 15   +Standing thoracic rotation at wall, 5" hold, x 15 each side   +Wall deb, blue half foam along spine at wall, 2x15   +Standing horizontal abduction, green band, 5" hold, 3x10  +Written HOME EXERCISE PROGRAM updated, reviewed, patient demo understanding     Neuromuscular re-education x 15 minutes to improve muscle activation and control:  Prone scap depression 20  Prone rows, +1# 3 x 10  Prone T +2 x 10  Prone shoulder extension 3 x 10 left  Prone lower trap lifts offs  3 x 10  Supine horizontal abduction, red band 2 x 10  Wall walk bilaterally with lift off 2 x 15  No money, + yellow band, 3 x 10   Serratus punch, supine 2#, 3 x 10  Diaphragmatic breathing with knees on leg elevator x 2 minutes: 4s inhale, 4s hold, 8s exhale    Vesta participated in dynamic functional therapeutic activities to improve functional performance for 5 minutes, including:  Lifting waist height  and carry 12.5# box with cues for proper body mechanics for work related tasks  Education on diaphragmatic breathing for management of symptoms  Education on safe BP parameters for exercise and activity and seeking medical attention   +UBE, retro, 1.5 resistance, x 5'     Vesta received the following manual therapy techniques: Joint mobilizations were applied to the: thoracic for 00 minutes, including:  STM bilateral thoracic paraspinals  L thoracic rib inhalation mobilization in sitting  Prone thoracic PAIVMs, Gr II/III- JM    Patient Education and Home Exercises       Education provided:   - Educated pt that he/she may feel soreness after session.    - Notify therapist of forewarning of AD symptoms    Home Exercises Provided: Instructed patient to continue current home exercise program.  Exercises were reviewed and Vesta was able to demonstrate them prior to the end of the session.  Vesta demonstrated good  understanding of " the education provided. See Electronic Medical Record under Patient Instructions for exercises provided during therapy sessions    Assessment   Emphasis on progression of thoracic mobility exercises today; patient able to completed with cuing for proper mechanics and appropriate level of challenge. Written HOME EXERCISE PROGRAM updated appropriately. Will benefit from continued physical therapy intervention to progress strength, ROM, and activity tolerance, and decreased pain, toward goals set forth in plan of care to improve functional mobility and quality of life.     Vesta is making good progress towards meeting set goals.    Patient prognosis is Fair.   Rehab potential: fair     Patient will continue to benefit from skilled outpatient physical therapy to address the deficits listed in the problem list box on initial evaluation, provide pt/family education and to maximize pt's level of independence in the home and community environment.     Patient's spiritual, cultural and educational needs considered and pt agreeable to plan of care and goals.     Anticipated barriers to physical therapy: co-morbidities     Goals:   Short Term Goals: 4 weeks ongoing  1.Report decreased in pain at worse less than  <   / =  5/10  to increase tolerance for functional activities   2. Pt to improve Back range of motion by 25% to allow for improved functional mobility to allow for improvement in IADLs.   3. Increased  BUE MMT 1/2  grade to increase tolerance for ADL and work activities.  4. Pt to report standing tolerance improved to 1-2 hours prior to requiring rest break.  5. Pt to tolerate HEP to improve ROM and independence with ADL's  6. Increased MMT for lower traps/middle traps/rhomboids to > or = 4-/5 to increase tolerance for ADL and improve posture.     Long Term Goals: 8 weeks ongoing  1.Report decreased in pain at worse less than  <   / =  2 /10  to increase tolerance for functional activities   2.Pt to improve range of  motion by 50% to allow for improved functional mobility to allow for improvement in IADLs.   3.Increased BUE MMT 1 grade  to increase tolerance for ADL and work activities.  4.  Pt will report return to appropriate workout routine to maintain functional abilities.  5. Increased MMT  for lower traps/middle traps/rhomboids to > or = 4/5 to increase tolerance for ADL and improve posture.       Plan     Continue per POC, progressing as appropriate to achieve stated goals.    Continue with: Plan of care Certification: 3/13/2024 to 5/20/2024.     Outpatient Physical Therapy 2 times weekly for 8 weeks to include the following interventions: Manual Therapy, Moist Heat/ Ice, Neuromuscular Re-ed, Patient Education, Therapeutic Activities, and Therapeutic Exercise.     Radha Ricks, PT

## 2024-04-16 NOTE — PROGRESS NOTES
[facility]   Physical Therapy Treatment Note      Name: Vesta Tucker  Clinic Number: [id number]    Therapy Diagnosis:   Encounter Diagnoses   Name Primary?    Decreased ROM of thoracic spine Yes    Acute left-sided thoracic back pain     Impaired functional mobility and activity tolerance        Physician: Rachael Laws PA-C    Visit Date: 4/17/2024    Physician Orders: PT Eval and Treat   Medical Diagnosis from Referral:   M54.6 (ICD-10-CM) - Acute left-sided thoracic back pain   M54.14 (ICD-10-CM) - Thoracic radiculopathy      Evaluation Date: 3/13/2024  Authorization Period Expiration: 12/21/2024  Plan of Care Expiration: 5/20/2024  Visit # / Visits authorized: 8/ 20  FOTO #2/3    Time In: 0735   Time Out: 0826   Total Time: 51 minutes  Total Billable Time: 51 minutes     Precautions: Standard and dons AFOs bilaterally, 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 Gets forewarning of upward flow of blood feeling from thoracic spine and face flush sensation, pressure in her ears, and is able to sense forewarning of AD.    PTA Visit #: 1/5     Subjective   HOME EXERCISE PROGRAM: reviewed, reports compliance   Response to previous treatment: mostly feels symptoms at area of shoulder blades bilaterally   Function:  continued limitation with daily routine inclusive of heavy lifting >#50 boxes from low points     Did well with minimal soreness after last session. No lifting boxes yesterday.     Pain: 2/10  Location: bilateral thoracic spine    Objective      Objective Measures updated at progress report unless specified.     Treatment     Bold = exercises performed  + = progressions    Vesta received therapeutic exercises to develop ROM, flexibility, and posture for 31 minutes including:  Supine AAROM left shoulder with T bar 10 x 10s (PROM/AAROM manually initially for ROM and comfort)  Seated lumbar  "flexion with ball x 10  Standing QL stretch with sidebending 5 x 10s  Rows, green tubing, 3 x 10  Shoulder extension, green tubing, palms forward, 3 x 10  Seated thoracic ext with half foam, 10"x10  Cat/camel in quadruped x 15   Standing thoracic rotation at wall, 5" hold, x 15 each side   Wall deb, blue half foam along spine at wall, 2x15   Standing horizontal abduction, green band, 5" hold, 3x10  +Written HOME EXERCISE PROGRAM updated (NP), reviewed, patient demo understanding     Neuromuscular re-education x 15 minutes to improve muscle activation and control:  Prone scap depression 20  Prone rows, +1# 3 x 10  Prone T +2 x 10  Prone shoulder extension 3 x 10 left  Prone lower trap lifts offs  3 x 10  Supine horizontal abduction, +green band 2 x 10  Wall walk bilaterally with lift off 2 x 15  No money, + yellow band, 3 x 10   Serratus punch, supine 2#, 3 x 10  Diaphragmatic breathing with knees on leg elevator x 2 minutes: 4s inhale, 4s hold, 8s exhale    Vesta participated in dynamic functional therapeutic activities to improve functional performance for 5 minutes, including:  Lifting waist height  and carry 12.5# box with cues for proper body mechanics for work related tasks  Education on diaphragmatic breathing for management of symptoms  Education on safe BP parameters for exercise and activity and seeking medical attention   UBE, retro, 1.5 resistance, x 5'    Vesta received the following manual therapy techniques: Joint mobilizations were applied to the: thoracic for 00 minutes, including:  STM bilateral thoracic paraspinals  L thoracic rib inhalation mobilization in sitting  Prone thoracic PAIVMs, Gr II/III- JM    Patient Education and Home Exercises       Education provided:   - Educated pt that he/she may feel soreness after session.    - Notify therapist of forewarning of AD symptoms    Home Exercises Provided: Instructed patient to continue current home exercise program.  Exercises were reviewed and " Vesta was able to demonstrate them prior to the end of the session.  Vesta demonstrated good  understanding of the education provided. See Electronic Medical Record under Patient Instructions for exercises provided during therapy sessions    Assessment     Improving overall pain level and appropriate soreness after progressions prior session. Manual cues for scapular activation with exercises to prevent compensatory shrug. Cues for set up and initial reps for proper form and execution. Mild limitation with right standing thoracic rotation. Improved pain reported after session.  Will benefit from continued physical therapy intervention to progress strength, ROM, and activity tolerance, and decreased pain, toward goals set forth in plan of care to improve functional mobility and quality of life.     Vesta is making good progress towards meeting set goals.    Patient prognosis is Fair.   Rehab potential: fair     Patient will continue to benefit from skilled outpatient physical therapy to address the deficits listed in the problem list box on initial evaluation, provide pt/family education and to maximize pt's level of independence in the home and community environment.     Patient's spiritual, cultural and educational needs considered and pt agreeable to plan of care and goals.     Anticipated barriers to physical therapy: co-morbidities     Goals:   Short Term Goals: 4 weeks ongoing  1.Report decreased in pain at worse less than  <   / =  5/10  to increase tolerance for functional activities   2. Pt to improve Back range of motion by 25% to allow for improved functional mobility to allow for improvement in IADLs.   3. Increased  BUE MMT 1/2  grade to increase tolerance for ADL and work activities.  4. Pt to report standing tolerance improved to 1-2 hours prior to requiring rest break.  5. Pt to tolerate HEP to improve ROM and independence with ADL's  6. Increased MMT for lower traps/middle traps/rhomboids to > or =  4-/5 to increase tolerance for ADL and improve posture.     Long Term Goals: 8 weeks ongoing  1.Report decreased in pain at worse less than  <   / =  2 /10  to increase tolerance for functional activities   2.Pt to improve range of motion by 50% to allow for improved functional mobility to allow for improvement in IADLs.   3.Increased BUE MMT 1 grade  to increase tolerance for ADL and work activities.  4.  Pt will report return to appropriate workout routine to maintain functional abilities.  5. Increased MMT  for lower traps/middle traps/rhomboids to > or = 4/5 to increase tolerance for ADL and improve posture.       Plan     Continue per POC, progressing as appropriate to achieve stated goals.    Continue with: Plan of care Certification: 3/13/2024 to 5/20/2024.     Outpatient Physical Therapy 2 times weekly for 8 weeks to include the following interventions: Manual Therapy, Moist Heat/ Ice, Neuromuscular Re-ed, Patient Education, Therapeutic Activities, and Therapeutic Exercise.     Alfreda Yoder, PTA

## 2024-04-16 NOTE — TELEPHONE ENCOUNTER
Please approve for Famotidine 40 mg    Last OV 02/28/24  Last refill date 01/12/24  Last labs 06/21/23  Next appt 06/06/24

## 2024-04-16 NOTE — TELEPHONE ENCOUNTER
No care due was identified.  [facility] Embedded Care Due Messages. Reference number: [id number].   4/16/2024 3:20:03 PM CDT

## 2024-04-17 ENCOUNTER — CLINICAL SUPPORT (OUTPATIENT)
Dept: REHABILITATION | Facility: HOSPITAL | Age: 64
End: 2024-04-17
Payer: COMMERCIAL

## 2024-04-17 DIAGNOSIS — M54.6 ACUTE LEFT-SIDED THORACIC BACK PAIN: ICD-10-CM

## 2024-04-17 DIAGNOSIS — M53.84 DECREASED ROM OF THORACIC SPINE: Primary | ICD-10-CM

## 2024-04-17 DIAGNOSIS — Z74.09 IMPAIRED FUNCTIONAL MOBILITY AND ACTIVITY TOLERANCE: ICD-10-CM

## 2024-04-17 PROCEDURE — 97112 NEUROMUSCULAR REEDUCATION: CPT | Mod: PN,CQ

## 2024-04-17 PROCEDURE — 97110 THERAPEUTIC EXERCISES: CPT | Mod: PN,CQ

## 2024-04-18 ENCOUNTER — TELEPHONE (OUTPATIENT)
Dept: PAIN MEDICINE | Facility: CLINIC | Age: 64
End: 2024-04-18
Payer: COMMERCIAL

## 2024-04-18 RX ORDER — FAMOTIDINE 40 MG/1
TABLET, FILM COATED ORAL
Qty: 180 TABLET | Refills: 1 | Status: SHIPPED | OUTPATIENT
Start: 2024-04-18

## 2024-04-23 NOTE — PROGRESS NOTES
"[facility]   Physical Therapy Treatment Note      Name: Vesta Tucker  Clinic Number: [id number]    Therapy Diagnosis:   Encounter Diagnoses   Name Primary?    Decreased ROM of thoracic spine Yes    Acute left-sided thoracic back pain     Impaired functional mobility and activity tolerance        Physician: Rachael Laws PA-C    Visit Date: 4/24/2024    Physician Orders: PT Eval and Treat   Medical Diagnosis from Referral:   M54.6 (ICD-10-CM) - Acute left-sided thoracic back pain   M54.14 (ICD-10-CM) - Thoracic radiculopathy      Evaluation Date: 3/13/2024  Authorization Period Expiration: 12/21/2024  Plan of Care Expiration: 5/20/2024  Visit # / Visits authorized: 9/ 20  FOTO #2/3  FOTO 4/24/2024 score 54-D/c    Time In: 0734   Time Out: 0850   Total Time: 44 minutes  Total Billable Time: 44 minutes     Precautions: Standard and dons AFOs bilaterally, 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 Gets forewarning of upward flow of blood feeling from thoracic spine and face flush sensation, pressure in her ears, and is able to sense forewarning of AD.    PTA Visit #: 2/5     Subjective   HOME EXERCISE PROGRAM: reviewed, reports compliance   Response to previous treatment: mostly feels symptoms at area of shoulder blades bilaterally   Function:  continued limitation with daily routine inclusive of heavy lifting >#50 boxes from low points     "My pain is almost all gone. My range of motion is getting better too. " Starting to resume lifting at work and able to lift 20# boxes without pain.     Pain: 2/10  Location: bilateral thoracic spine    Objective      Objective Measures updated at progress report unless specified.     Treatment     Bold = exercises performed  + = progressions    Vesta received therapeutic exercises to develop ROM, flexibility, and posture for 24 minutes including:  Supine AAROM " "left shoulder with T bar 10 x 10s (PROM/AAROM manually initially for ROM and comfort)  Seated lumbar flexion with ball x 10  Standing QL stretch with sidebending 5 x 10s  Rows, green tubing, 3 x 10  Shoulder extension, green tubing, palms forward, 3 x 10  Seated thoracic ext with half foam, 10"x10  Cat/camel in quadruped x 15   +Hand heel rock x 10  Standing thoracic rotation at wall, 5" hold, x 15 each side   +Standing UE openers (sunrise) at wall x 10  Wall deb, blue half foam along spine at wall, 2x15   Standing horizontal abduction, green band, 5" hold, 3x10  Written HOME EXERCISE PROGRAM updated (NP), reviewed, patient demo understanding     Neuromuscular re-education x 15 minutes to improve muscle activation and control:  Prone scap depression 20  Prone rows, +1# 3 x 10  Prone T +2 x 10  Prone shoulder extension 3 x 10 left  Prone lower trap lifts offs  3 x 10  +Prone lower trap lifts with hands behind head 2 x 10  Supine horizontal abduction, green band 30  Wall walk bilaterally with lift off 2 x 15  No money, + red looped band, 3 x 10   Serratus punch, supine +3#, 3 x 10  Flexion supine, green band 2 x 10 each  Diaphragmatic breathing with knees on leg elevator x 2 minutes: 4s inhale, 4s hold, 8s exhale    Vesta participated in dynamic functional therapeutic activities to improve functional performance for 5 minutes, including:  Lifting waist height  and carry 12.5# box with cues for proper body mechanics for work related tasks  Education on diaphragmatic breathing for management of symptoms  Education on safe BP parameters for exercise and activity and seeking medical attention   UBE, retro, 1.5 resistance, x 5'    Vesta received the following manual therapy techniques: Joint mobilizations were applied to the: thoracic for 00 minutes, including:  STM bilateral thoracic paraspinals  L thoracic rib inhalation mobilization in sitting  Prone thoracic PAIVMs, Gr II/III- JM    Patient Education and Home Exercises "       Education provided:   - Educated pt that he/she may feel soreness after session.    - Notify therapist of forewarning of AD symptoms    Home Exercises Provided: Instructed patient to continue current home exercise program.  Exercises were reviewed and Vesta was able to demonstrate them prior to the end of the session.  Vesta demonstrated good  understanding of the education provided. See Electronic Medical Record under Patient Instructions for exercises provided during therapy sessions    Assessment     Improving pain and ROM with good home exercise program compliance. Improving functional mobility with ability to lift heavier weighted boxes without pain. Progressed strengthening and ROM, flexibility with good response. Manual cues for scapular activation with exercises to prevent compensatory shrug. Cues for set up and initial reps for proper form and execution. Mild limitation with right standing thoracic rotation which improved with repetition.Will benefit from continued physical therapy intervention to progress strength, ROM, and activity tolerance, and decreased pain, toward goals set forth in plan of care to improve functional mobility and quality of life.     Vesta is making good progress towards meeting set goals.    Patient prognosis is Fair.   Rehab potential: fair     Patient will continue to benefit from skilled outpatient physical therapy to address the deficits listed in the problem list box on initial evaluation, provide pt/family education and to maximize pt's level of independence in the home and community environment.     Patient's spiritual, cultural and educational needs considered and pt agreeable to plan of care and goals.     Anticipated barriers to physical therapy: co-morbidities     Goals:   Short Term Goals: 4 weeks ongoing  1.Report decreased in pain at worse less than  <   / =  5/10  to increase tolerance for functional activities   2. Pt to improve Back range of motion by 25% to  allow for improved functional mobility to allow for improvement in IADLs.   3. Increased  BUE MMT 1/2  grade to increase tolerance for ADL and work activities.  4. Pt to report standing tolerance improved to 1-2 hours prior to requiring rest break.  5. Pt to tolerate HEP to improve ROM and independence with ADL's  6. Increased MMT for lower traps/middle traps/rhomboids to > or = 4-/5 to increase tolerance for ADL and improve posture.     Long Term Goals: 8 weeks ongoing  1.Report decreased in pain at worse less than  <   / =  2 /10  to increase tolerance for functional activities   2.Pt to improve range of motion by 50% to allow for improved functional mobility to allow for improvement in IADLs.   3.Increased BUE MMT 1 grade  to increase tolerance for ADL and work activities.  4.  Pt will report return to appropriate workout routine to maintain functional abilities.  5. Increased MMT  for lower traps/middle traps/rhomboids to > or = 4/5 to increase tolerance for ADL and improve posture.       Plan     Continue per POC, progressing as appropriate to achieve stated goals.    Continue with: Plan of care Certification: 3/13/2024 to 5/20/2024.     Outpatient Physical Therapy 2 times weekly for 8 weeks to include the following interventions: Manual Therapy, Moist Heat/ Ice, Neuromuscular Re-ed, Patient Education, Therapeutic Activities, and Therapeutic Exercise.     Alfreda Yoder, PTA

## 2024-04-24 ENCOUNTER — CLINICAL SUPPORT (OUTPATIENT)
Dept: REHABILITATION | Facility: HOSPITAL | Age: 64
End: 2024-04-24
Payer: COMMERCIAL

## 2024-04-24 DIAGNOSIS — Z74.09 IMPAIRED FUNCTIONAL MOBILITY AND ACTIVITY TOLERANCE: ICD-10-CM

## 2024-04-24 DIAGNOSIS — M54.6 ACUTE LEFT-SIDED THORACIC BACK PAIN: ICD-10-CM

## 2024-04-24 DIAGNOSIS — M53.84 DECREASED ROM OF THORACIC SPINE: Primary | ICD-10-CM

## 2024-04-24 PROCEDURE — 97110 THERAPEUTIC EXERCISES: CPT | Mod: PN,CQ

## 2024-04-24 PROCEDURE — 97112 NEUROMUSCULAR REEDUCATION: CPT | Mod: PN,CQ

## 2024-04-26 ENCOUNTER — CLINICAL SUPPORT (OUTPATIENT)
Dept: REHABILITATION | Facility: HOSPITAL | Age: 64
End: 2024-04-26
Payer: COMMERCIAL

## 2024-04-26 DIAGNOSIS — M53.84 DECREASED ROM OF THORACIC SPINE: Primary | ICD-10-CM

## 2024-04-26 DIAGNOSIS — Z74.09 IMPAIRED FUNCTIONAL MOBILITY AND ACTIVITY TOLERANCE: ICD-10-CM

## 2024-04-26 DIAGNOSIS — M54.6 ACUTE LEFT-SIDED THORACIC BACK PAIN: ICD-10-CM

## 2024-04-26 PROCEDURE — 97110 THERAPEUTIC EXERCISES: CPT | Mod: PN

## 2024-04-26 PROCEDURE — 97112 NEUROMUSCULAR REEDUCATION: CPT | Mod: PN

## 2024-04-30 DIAGNOSIS — Z00.00 ROUTINE GENERAL MEDICAL EXAMINATION AT A HEALTH CARE FACILITY: ICD-10-CM

## 2024-04-30 NOTE — PROGRESS NOTES
[facility]   Physical Therapy Treatment Note      Name: Vesta Tucker  Clinic Number: [id number]    Therapy Diagnosis:   Encounter Diagnoses   Name Primary?    Decreased ROM of thoracic spine Yes    Acute left-sided thoracic back pain     Impaired functional mobility and activity tolerance        Physician: Rachael Laws PA-C    Visit Date: 5/1/2024    Physician Orders: PT Eval and Treat   Medical Diagnosis from Referral:   M54.6 (ICD-10-CM) - Acute left-sided thoracic back pain   M54.14 (ICD-10-CM) - Thoracic radiculopathy      Evaluation Date: 3/13/2024  Authorization Period Expiration: 12/21/2024  Plan of Care Expiration: 5/20/2024    Visit # / Visits authorized: 9/20  FOTO #2/3    Time In: 0841   Time Out: 0925   Total Time: 44 minutes  Total Billable Time: 34 minutes     Precautions: Standard and dons AFOs bilaterally, 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 Gets forewarning of upward flow of blood feeling from thoracic spine and face flush sensation, pressure in her ears, and is able to sense forewarning of AD.    PTA Visit #: 1/5     Subjective   HOME EXERCISE PROGRAM: reviewed, reports compliance   Response to previous treatment: mostly feels symptoms at area of shoulder blades bilaterally   Function:  continued limitation with daily routine inclusive of heavy lifting >#50 boxes from low points     Worked in the garden over the weekend and was weeding (stooping) and felt uncomfortable to 5-6/10.  Had to take tylenol which improved pain. Plans to use her rolling cart to sit on for weeding for future purposes.     Pain: 2/10  Location: bilateral thoracic spine    Objective        Not performed this date:  Observation: donning bilateral AFOs     Posture Alignment: increased lordosis; elevation of right shoulder     Sensation: Light touch: Impaired: anterior lower leg bilaterally     GAIT  "DEVIATIONS: Vesta ambulates with bilateral AFO and displays wide base of support decreased knee flexion in swing phase bilaterally     Cervical ROM: WFL for flexion, extension, B rotation  AROM 75% for bilateral sidebend with stretching to UT     Lumbar Range of Motion:     AROM % Pain   Flexion 75% stretch      Extension 25% Yes and difficulty transitioning from flexion to neutral position without UE support      Right Side Bending 75% Y to L side      Left Side Bending 50% Y with radicular symptoms from L thoracic to chest      Right Rotation 25% Y to L   Left Rotation 25% Y, worse than R rotation with pain to L side      Shoulder Range of Motion:   Shoulder Left Right   Flexion 120* 165   Abduction 125* 165   ER Occiput  C7   IR T12 T10      Upper Extremity Strength  (R) UE   (L) UE     Shoulder elevation: 5/5 Shoulder elevation: 5/5   Shoulder flexion: 4-/5 Shoulder flexion: 3/5*   Shoulder Abduction: 4-/5 Shoulder abduction: 3/5*   Shoulder ER 4/5 Shoulder ER 4/5   Shoulder IR 4+/5 Shoulder IR 4+/5   Lower Trap 3/5 Lower Trap 3-/5*   Middle Trap 3+/5 Middle Trap 3-/5   Rhomboids 3+/5 Rhomboids 3+/5*         Joint Mobility: hypomobility to mid thoracic spine with PA    Treatment     Bold = exercises performed  + = progressions    Therapeutic exercises to develop ROM, flexibility, and posture for 19 minutes 1:1 supervision including:  Supine AAROM left shoulder with T bar 10 x 10s (PROM/AAROM manually initially for ROM and comfort)  Seated lumbar flexion with ball x 10  Standing QL stretch with sidebending 5 x 10s  Rows, green tubing, 3 x 10  Shoulder extension, green tubing, palms forward, 3 x 10  Seated thoracic ext with half foam, 10"x10  Cat/camel in quadruped x 15   Standing thoracic rotation at wall, 5" hold, x 15 each side   Wall deb, blue half foam along spine at wall, 2x15   Sitting horizontal abduction, green band, 5" hold, 3x10    Neuromuscular re-education x 10 minutes to improve muscle activation " "and control:  Prone scap depression 20  Prone rows, +1# 3 x 10  Prone T +2 x 10  Prone shoulder extension 3 x 10 left  Prone lower trap lifts offs  3 x 10  Supine horizontal abduction, red band 2 x 10  Wall walk bilaterally with lift off 2 x 15  No money, +red band, 3" hold away, 3 x 10   Serratus punch, supine 3#, 2x20  Diaphragmatic breathing with knees on leg elevator x 2 minutes: 4s inhale, 4s hold, 8s exhale    Functional therapeutic activities to improve functional performance for 5 minutes, including:  Lifting waist height  and carry 12.5# box with cues for proper body mechanics for work related tasks  Education on diaphragmatic breathing for management of symptoms  Education on safe BP parameters for exercise and activity and seeking medical attention   UBE, retro, 1.5 resistance, x 5'     Manual therapy techniques: Joint mobilizations were applied to the: thoracic for 00 minutes, including:  STM bilateral thoracic paraspinals  L thoracic rib inhalation mobilization in sitting  Prone thoracic PAIVMs, Gr II/III- JM    Patient Education and Home Exercises       Education provided:   - Educated pt that he/she may feel soreness after session.    - Notify therapist of forewarning of AD symptoms    Home Exercises Provided: Instructed patient to continue current home exercise program.  Exercises were reviewed and Vesta was able to demonstrate them prior to the end of the session.  Vesta demonstrated good  understanding of the education provided. See Electronic Medical Record under Patient Instructions for exercises provided during therapy sessions    Assessment      Late arrival. (Thought her appt was at 8:30) Increased discomfort with prolonged stooping over the weekend with gardening, but plans to use rolling stool for weeding in the future for back protection. Good home exercise program compliance. Patient presents with improving thoracic mobility and tolerance for exercise intensity progressions. Emphasis on " posture with strengthening exercises and activities to prebent forward head and rounded shoulders, which causes discomfort. She will benefit from continued physical therapy intervention to progress strength, ROM, and activity tolerance, and decreased pain, toward goals set forth in plan of care to improve functional mobility and quality of life.     Vesta is making good progress towards meeting set goals.    Patient prognosis is Fair.   Rehab potential: fair     Patient will continue to benefit from skilled outpatient physical therapy to address the deficits listed in the problem list box on initial evaluation, provide pt/family education and to maximize pt's level of independence in the home and community environment.     Patient's spiritual, cultural and educational needs considered and pt agreeable to plan of care and goals.     Anticipated barriers to physical therapy: co-morbidities     Goals:   Short Term Goals: 4 weeks ongoing  1.Report decreased in pain at worse less than  <   / =  5/10  to increase tolerance for functional activities   2. Pt to improve Back range of motion by 25% to allow for improved functional mobility to allow for improvement in IADLs.   3. Increased  BUE MMT 1/2  grade to increase tolerance for ADL and work activities.  4. Pt to report standing tolerance improved to 1-2 hours prior to requiring rest break.  5. Pt to tolerate HEP to improve ROM and independence with ADL's  6. Increased MMT for lower traps/middle traps/rhomboids to > or = 4-/5 to increase tolerance for ADL and improve posture.     Long Term Goals: 8 weeks ongoing  1.Report decreased in pain at worse less than  <   / =  2 /10  to increase tolerance for functional activities   2.Pt to improve range of motion by 50% to allow for improved functional mobility to allow for improvement in IADLs.   3.Increased BUE MMT 1 grade  to increase tolerance for ADL and work activities.  4.  Pt will report return to appropriate workout  routine to maintain functional abilities.  5. Increased MMT  for lower traps/middle traps/rhomboids to > or = 4/5 to increase tolerance for ADL and improve posture.       Plan     Continue per POC, progressing as appropriate to achieve stated goals.    Continue with: Plan of care Certification: 3/13/2024 to 5/20/2024.     Outpatient Physical Therapy 2 times weekly for 8 weeks to include the following interventions: Manual Therapy, Moist Heat/ Ice, Neuromuscular Re-ed, Patient Education, Therapeutic Activities, and Therapeutic Exercise.     Alfreda Yoder, PTA

## 2024-04-30 NOTE — TELEPHONE ENCOUNTER
No care due was identified.  [facility] Embedded Care Due Messages. Reference number: [id number].   4/30/2024 2:35:25 PM CDT

## 2024-04-30 NOTE — TELEPHONE ENCOUNTER
Refill Routing Note   Medication(s) are not appropriate for processing by [facility] for the following reason(s):        Outside of protocol    [facility] action(s):  Route               Appointments  past 12m or future 3m with PCP    Date Provider   Last Visit   9/27/2022 Shaina Rasmussen MD   Next Visit   6/6/2024 Shaina Rasmussen MD   ED visits in past 90 days: 0        Note composed:3:55 PM 04/30/2024

## 2024-05-01 ENCOUNTER — CLINICAL SUPPORT (OUTPATIENT)
Dept: REHABILITATION | Facility: HOSPITAL | Age: 64
End: 2024-05-01
Payer: COMMERCIAL

## 2024-05-01 DIAGNOSIS — Z74.09 IMPAIRED FUNCTIONAL MOBILITY AND ACTIVITY TOLERANCE: ICD-10-CM

## 2024-05-01 DIAGNOSIS — M53.84 DECREASED ROM OF THORACIC SPINE: Primary | ICD-10-CM

## 2024-05-01 DIAGNOSIS — M54.6 ACUTE LEFT-SIDED THORACIC BACK PAIN: ICD-10-CM

## 2024-05-01 PROCEDURE — 97112 NEUROMUSCULAR REEDUCATION: CPT | Mod: PN,CQ

## 2024-05-01 PROCEDURE — 97110 THERAPEUTIC EXERCISES: CPT | Mod: PN,CQ

## 2024-05-02 RX ORDER — CYCLOBENZAPRINE HCL 10 MG
10 TABLET ORAL
Qty: 90 TABLET | Refills: 1 | Status: SHIPPED | OUTPATIENT
Start: 2024-05-02 | End: 2024-06-06 | Stop reason: SDUPTHER

## 2024-05-02 NOTE — PROGRESS NOTES
[facility]   Physical Therapy Treatment Note      Name: Vesta Tucker  Clinic Number: [id number]    Therapy Diagnosis:   Encounter Diagnoses   Name Primary?    Decreased ROM of thoracic spine Yes    Acute left-sided thoracic back pain     Impaired functional mobility and activity tolerance        Physician: Rachael Laws PA-C    Visit Date: 5/3/2024    Physician Orders: PT Eval and Treat   Medical Diagnosis from Referral:   M54.6 (ICD-10-CM) - Acute left-sided thoracic back pain   M54.14 (ICD-10-CM) - Thoracic radiculopathy      Evaluation Date: 3/13/2024  Authorization Period Expiration: 12/21/2024  Plan of Care Expiration: 5/20/2024    Visit # / Visits authorized: 10/20  FOTO d/abelino last score 54 on 4/24/2024    Time In: 0645   Time Out: 0734   Total Time: 44 minutes  Total Billable Time: 44 minutes     Precautions: Standard and dons AFOs bilaterally, 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 Gets forewarning of upward flow of blood feeling from thoracic spine and face flush sensation, pressure in her ears, and is able to sense forewarning of AD.    PTA Visit #: 2/5     Subjective   HOME EXERCISE PROGRAM: reviewed, reports compliance   Response to previous treatment: mostly feels symptoms at area of shoulder blades bilaterally   Function:  continued limitation with daily routine inclusive of heavy lifting >#50 boxes from low points     Pain lingering around 2/10 right lower thoracic paraspinals.     Pain: 2/10  Location: bilateral thoracic spine    Objective        Not performed this date:  Observation: donning bilateral AFOs     Posture Alignment: increased lordosis; elevation of right shoulder     Sensation: Light touch: Impaired: anterior lower leg bilaterally     GAIT DEVIATIONS: Vesta ambulates with bilateral AFO and displays wide base of support decreased knee flexion in swing phase  "bilaterally     Cervical ROM: WFL for flexion, extension, B rotation  AROM 75% for bilateral sidebend with stretching to UT     Lumbar Range of Motion:     AROM % Pain   Flexion 75% stretch      Extension 25% Yes and difficulty transitioning from flexion to neutral position without UE support      Right Side Bending 75% Y to L side      Left Side Bending 50% Y with radicular symptoms from L thoracic to chest      Right Rotation 25% Y to L   Left Rotation 25% Y, worse than R rotation with pain to L side      Shoulder Range of Motion:   Shoulder Left Right   Flexion 120* 165   Abduction 125* 165   ER Occiput  C7   IR T12 T10      Upper Extremity Strength  (R) UE   (L) UE     Shoulder elevation: 5/5 Shoulder elevation: 5/5   Shoulder flexion: 4-/5 Shoulder flexion: 3/5*   Shoulder Abduction: 4-/5 Shoulder abduction: 3/5*   Shoulder ER 4/5 Shoulder ER 4/5   Shoulder IR 4+/5 Shoulder IR 4+/5   Lower Trap 3/5 Lower Trap 3-/5*   Middle Trap 3+/5 Middle Trap 3-/5   Rhomboids 3+/5 Rhomboids 3+/5*         Joint Mobility: hypomobility to mid thoracic spine with PA    Treatment     Bold = exercises performed  + = progressions    Therapeutic exercises to develop ROM, flexibility, and posture for 24 minutes 1:1 supervision including:  Supine AAROM left shoulder with T bar 10 x 10s (PROM/AAROM manually initially for ROM and comfort)  Seated lumbar flexion with ball x 10  Standing QL stretch with sidebending 5 x 10s  Rows, green tubing, 3 x 10  Shoulder extension, green tubing, palms forward, 3 x 10  Seated thoracic ext with half foam, 10"x10  Cat/camel in quadruped x 15   Standing thoracic rotation at wall, 5" hold, x 15 each side   Wall deb, blue half foam along spine at wall, 2x15   Standing horizontal abduction, green band, 5" hold, 3x10    Neuromuscular re-education x 10 minutes to improve muscle activation and control:  Prone scap depression 20  Prone rows, +1# 3 x 10  Prone T +2 x 10  Prone shoulder extension 3 x 10 " "left  Prone lower trap lifts offs  3 x 10  Supine horizontal abduction, red band 2 x 10  Wall walk bilaterally with lift off 2 x 15  No money, +green band, 3" hold, 3 x 10   Serratus punch, supine 3#, 2x20  Diaphragmatic breathing with knees on leg elevator x 2 minutes: 4s inhale, 4s hold, 8s exhale    Functional therapeutic activities to improve functional performance for 10 minutes, including:  Lifting waist height  and carry 12.5# box with cues for proper body mechanics for work related tasks  Education on diaphragmatic breathing for management of symptoms  Education on safe BP parameters for exercise and activity and seeking medical attention   UBE, retro, standing, 1.5 resistance, x 5'     Manual therapy techniques: Joint mobilizations were applied to the: thoracic for 00 minutes, including:  STM bilateral thoracic paraspinals  L thoracic rib inhalation mobilization in sitting  Prone thoracic PAIVMs, Gr II/III-     Patient Education and Home Exercises       Education provided:   - Educated pt that he/she may feel soreness after session.    - Notify therapist of forewarning of AD symptoms    Home Exercises Provided: Instructed patient to continue current home exercise program.  Exercises were reviewed and Vesta was able to demonstrate them prior to the end of the session.  Vesta demonstrated good  understanding of the education provided. See Electronic Medical Record under Patient Instructions for exercises provided during therapy sessions    Assessment     Continued minimal lingering lower thoracic paraspinal pain and pt is not tolerant to manual therapy 2* hypersensitivity. Good home exercise program compliance. Progressed strengthening with minimal manual cues for proper form and holds with improved pain. Cues for posture correction. Worked on mechanics with lifting with cues for back protection. Denied pain after session. She will benefit from continued physical therapy intervention to progress strength, " ROM, and activity tolerance, and decreased pain, toward goals set forth in plan of care to improve functional mobility and quality of life.     Vesta is making good progress towards meeting set goals.    Patient prognosis is Fair.   Rehab potential: fair     Patient will continue to benefit from skilled outpatient physical therapy to address the deficits listed in the problem list box on initial evaluation, provide pt/family education and to maximize pt's level of independence in the home and community environment.     Patient's spiritual, cultural and educational needs considered and pt agreeable to plan of care and goals.     Anticipated barriers to physical therapy: co-morbidities     Goals:   Short Term Goals: 4 weeks ongoing  1.Report decreased in pain at worse less than  <   / =  5/10  to increase tolerance for functional activities   2. Pt to improve Back range of motion by 25% to allow for improved functional mobility to allow for improvement in IADLs.   3. Increased  BUE MMT 1/2  grade to increase tolerance for ADL and work activities.  4. Pt to report standing tolerance improved to 1-2 hours prior to requiring rest break.  5. Pt to tolerate HEP to improve ROM and independence with ADL's  6. Increased MMT for lower traps/middle traps/rhomboids to > or = 4-/5 to increase tolerance for ADL and improve posture.     Long Term Goals: 8 weeks ongoing  1.Report decreased in pain at worse less than  <   / =  2 /10  to increase tolerance for functional activities   2.Pt to improve range of motion by 50% to allow for improved functional mobility to allow for improvement in IADLs.   3.Increased BUE MMT 1 grade  to increase tolerance for ADL and work activities.  4.  Pt will report return to appropriate workout routine to maintain functional abilities.  5. Increased MMT  for lower traps/middle traps/rhomboids to > or = 4/5 to increase tolerance for ADL and improve posture.       Plan     Continue per POC, progressing  as appropriate to achieve stated goals.    Continue with: Plan of care Certification: 3/13/2024 to 5/20/2024.     Outpatient Physical Therapy 2 times weekly for 8 weeks to include the following interventions: Manual Therapy, Moist Heat/ Ice, Neuromuscular Re-ed, Patient Education, Therapeutic Activities, and Therapeutic Exercise.     Alfreda Yoder, PTA

## 2024-05-03 ENCOUNTER — CLINICAL SUPPORT (OUTPATIENT)
Dept: REHABILITATION | Facility: HOSPITAL | Age: 64
End: 2024-05-03
Payer: COMMERCIAL

## 2024-05-03 DIAGNOSIS — Z74.09 IMPAIRED FUNCTIONAL MOBILITY AND ACTIVITY TOLERANCE: ICD-10-CM

## 2024-05-03 DIAGNOSIS — M54.6 ACUTE LEFT-SIDED THORACIC BACK PAIN: ICD-10-CM

## 2024-05-03 DIAGNOSIS — M53.84 DECREASED ROM OF THORACIC SPINE: Primary | ICD-10-CM

## 2024-05-03 PROCEDURE — 97112 NEUROMUSCULAR REEDUCATION: CPT | Mod: PN,CQ

## 2024-05-03 PROCEDURE — 97110 THERAPEUTIC EXERCISES: CPT | Mod: PN,CQ

## 2024-05-03 PROCEDURE — 97530 THERAPEUTIC ACTIVITIES: CPT | Mod: PN,CQ

## 2024-05-05 NOTE — PROGRESS NOTES
[facility]   Physical Therapy Treatment Note      Name: Vesta Tucker  Clinic Number: [id number]    Therapy Diagnosis:   Encounter Diagnoses   Name Primary?    Decreased ROM of thoracic spine Yes    Acute left-sided thoracic back pain     Impaired functional mobility and activity tolerance      Physician: Rachael Laws PA-C    Visit Date: 5/6/2024    Physician Orders: PT Eval and Treat   Medical Diagnosis from Referral:   M54.6 (ICD-10-CM) - Acute left-sided thoracic back pain   M54.14 (ICD-10-CM) - Thoracic radiculopathy      Evaluation Date: 3/13/2024  Authorization Period Expiration: 12/21/2024  Plan of Care Expiration: 7/6/2024  Visit # / Visits authorized: 13/20  PHYSICAL THERAPY POC: 16 visits   FOTO d/c last score 54 on 4/24/2024    Time In:  6:54am  Time Out:  7:51am  Total Time:  57 minutes     Precautions: Standard and dons AFOs bilaterally, 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 Gets forewarning of upward flow of blood feeling from thoracic spine and face flush sensation, pressure in her ears, and is able to sense forewarning of AD.    Subjective   HOME EXERCISE PROGRAM: reviewed, reports compliance   Response to previous treatment: mostly feels symptoms at area of shoulder blades bilaterally   Function:  continued limitation with daily routine inclusive of heavy lifting >#50 boxes from low points     Pain: 2/10  Location: bilateral thoracic spine    Objective    Observation: donning bilateral AFOs     Posture Alignment: increased lordosis; elevation of right shoulder     Sensation: Light touch: Impaired: anterior lower leg bilaterally     GAIT DEVIATIONS: Vesat ambulates with bilateral AFO and displays wide base of support decreased knee flexion in swing phase bilaterally     Cervical ROM: WFL for flexion, extension, B rotation  AROM 75% for bilateral sidebend with stretching to  "UT     Lumbar Range of Motion:     AROM % Pain   Flexion 75% stretch      Extension 25% Yes and difficulty transitioning from flexion to neutral position without UE support      Right Side Bending 75% Y to L side      Left Side Bending 50% Y with radicular symptoms from L thoracic to chest      Right Rotation 25% Y to L   Left Rotation 25% Y, worse than R rotation with pain to L side      Shoulder Range of Motion:   Shoulder Left Right   Flexion 120* 165   Abduction 125* 165   ER Occiput  C7   IR T12 T10      Upper Extremity Strength  (R) UE   (L) UE     Shoulder elevation: 5/5 Shoulder elevation: 5/5   Shoulder flexion: 4-/5 Shoulder flexion: 3/5*   Shoulder Abduction: 4-/5 Shoulder abduction: 3/5*   Shoulder ER 4/5 Shoulder ER 4/5   Shoulder IR 4+/5 Shoulder IR 4+/5   Lower Trap 3/5 Lower Trap 3-/5*   Middle Trap 3+/5 Middle Trap 3-/5   Rhomboids 3+/5 Rhomboids 3+/5*         Joint Mobility: hypomobility to mid thoracic spine with PA    Treatment     Bold = exercises performed  + = progressions    Therapeutic exercises to develop ROM, flexibility, and posture for 39 minutes:  Supine AAROM left shoulder with T bar 10 x 10s (PROM/AAROM manually initially for ROM and comfort)  Seated lumbar flexion with ball x 10  Standing QL stretch with sidebending 5 x 10s  Rows, green tubing, 3 x 10  Shoulder extension, green tubing, palms forward, 3 x 10  Seated thoracic ext with half foam, 10"x10  Cat/camel in quadruped x 15   Standing thoracic rotation at wall, 5" hold, x 15 each side   Wall deb, blue half foam along spine at wall, 2x15   Standing horizontal abduction, green band, 5" hold, 3x10    Neuromuscular re-education x 12 minutes to improve muscle activation and control:  Prone scap depression 20  Prone rows, +1# 3 x 10  Prone T +2 x 10  Prone shoulder extension 3 x 10 left  Prone lower trap lifts offs  3 x 10  Supine horizontal abduction, red band 2 x 10  Wall walk bilaterally with lift off 2 x 15  No money, +green " "band, 3" hold, 3 x 10   Serratus punch, supine 3#, 2x20  Diaphragmatic breathing with knees on leg elevator x 2 minutes: 4s inhale, 4s hold, 8s exhale    Functional therapeutic activities to improve functional performance for 6 minutes:  Lifting waist height  and carry 12.5# box with cues for proper body mechanics for work related tasks  Education on diaphragmatic breathing for management of symptoms  Education on safe BP parameters for exercise and activity and seeking medical attention   UBE, forward 3'/retro 3', 3.5 resistance     Manual therapy techniques: Joint mobilizations were applied to the: thoracic for 00 minutes:  STM bilateral thoracic paraspinals  L thoracic rib inhalation mobilization in sitting  Prone thoracic PAIVMs, Gr II/III- JM    Patient Education and Home Exercises       Education provided:   - Educated pt that he/she may feel soreness after session.    - Notify therapist of forewarning of AD symptoms    Home Exercises Provided: Instructed patient to continue current home exercise program.  Exercises were reviewed and Vesta was able to demonstrate them prior to the end of the session.  Vesta demonstrated good  understanding of the education provided. See Electronic Medical Record under Patient Instructions for exercises provided during therapy sessions    Assessment   Emphasis on thoracic postural muscle activation and endurance throughout session today. Patient is able to complete progressions with appropriate level of challenge and proper mechanics. She will benefit from continued physical therapy intervention to progress strength, ROM, and activity tolerance, and decreased pain, toward goals set forth in plan of care to improve functional mobility and quality of life.     Vesta is making good progress towards meeting set goals.    Patient prognosis is Fair.   Rehab potential: fair     Patient will continue to benefit from skilled outpatient physical therapy to address the deficits listed in the " problem list box on initial evaluation, provide pt/family education and to maximize pt's level of independence in the home and community environment.      Anticipated barriers to physical therapy: co-morbidities     Goals:   Short Term Goals: 4 weeks ongoing  1.Report decreased in pain at worse less than  <   / =  5/10  to increase tolerance for functional activities   2. Pt to improve Back range of motion by 25% to allow for improved functional mobility to allow for improvement in IADLs.   3. Increased  BUE MMT 1/2  grade to increase tolerance for ADL and work activities.  4. Pt to report standing tolerance improved to 1-2 hours prior to requiring rest break.  5. Pt to tolerate HEP to improve ROM and independence with ADL's  6. Increased MMT for lower traps/middle traps/rhomboids to > or = 4-/5 to increase tolerance for ADL and improve posture.     Long Term Goals: 8 weeks ongoing  1.Report decreased in pain at worse less than  <   / =  2 /10  to increase tolerance for functional activities   2.Pt to improve range of motion by 50% to allow for improved functional mobility to allow for improvement in IADLs.   3.Increased BUE MMT 1 grade  to increase tolerance for ADL and work activities.  4.  Pt will report return to appropriate workout routine to maintain functional abilities.  5. Increased MMT  for lower traps/middle traps/rhomboids to > or = 4/5 to increase tolerance for ADL and improve posture.    Plan   Outpatient Physical Therapy 2 times weekly for 8 weeks to include the following interventions: Manual Therapy, Moist Heat/ Ice, Neuromuscular Re-ed, Patient Education, Therapeutic Activities, and Therapeutic Exercise.     Physical therapist and physical therapy assistant(s) met face to face to discuss patient's treatment plan and progress towards established goals. Pt will be seen by a physical therapist minimally every 6th visit or every 30 days.    Radha Ricks, PT

## 2024-05-06 ENCOUNTER — CLINICAL SUPPORT (OUTPATIENT)
Dept: REHABILITATION | Facility: HOSPITAL | Age: 64
End: 2024-05-06
Payer: COMMERCIAL

## 2024-05-06 DIAGNOSIS — M53.84 DECREASED ROM OF THORACIC SPINE: Primary | ICD-10-CM

## 2024-05-06 DIAGNOSIS — Z74.09 IMPAIRED FUNCTIONAL MOBILITY AND ACTIVITY TOLERANCE: ICD-10-CM

## 2024-05-06 DIAGNOSIS — M54.6 ACUTE LEFT-SIDED THORACIC BACK PAIN: ICD-10-CM

## 2024-05-06 PROCEDURE — 97110 THERAPEUTIC EXERCISES: CPT | Mod: PN

## 2024-05-06 PROCEDURE — 97112 NEUROMUSCULAR REEDUCATION: CPT | Mod: PN

## 2024-05-07 ENCOUNTER — OFFICE VISIT (OUTPATIENT)
Dept: PAIN MEDICINE | Facility: CLINIC | Age: 64
End: 2024-05-07
Payer: COMMERCIAL

## 2024-05-07 VITALS
DIASTOLIC BLOOD PRESSURE: 62 MMHG | SYSTOLIC BLOOD PRESSURE: 121 MMHG | HEART RATE: 89 BPM | WEIGHT: 158.75 LBS | BODY MASS INDEX: 25.62 KG/M2

## 2024-05-07 DIAGNOSIS — M54.6 ACUTE LEFT-SIDED THORACIC BACK PAIN: Primary | ICD-10-CM

## 2024-05-07 DIAGNOSIS — M54.14 THORACIC RADICULOPATHY: ICD-10-CM

## 2024-05-07 PROCEDURE — 3008F BODY MASS INDEX DOCD: CPT | Mod: CPTII,S$GLB,, | Performed by: PHYSICIAN ASSISTANT

## 2024-05-07 PROCEDURE — 99213 OFFICE O/P EST LOW 20 MIN: CPT | Mod: S$GLB,,, | Performed by: PHYSICIAN ASSISTANT

## 2024-05-07 PROCEDURE — 1159F MED LIST DOCD IN RCRD: CPT | Mod: CPTII,S$GLB,, | Performed by: PHYSICIAN ASSISTANT

## 2024-05-07 PROCEDURE — 1160F RVW MEDS BY RX/DR IN RCRD: CPT | Mod: CPTII,S$GLB,, | Performed by: PHYSICIAN ASSISTANT

## 2024-05-07 PROCEDURE — 99999 PR PBB SHADOW E&M-EST. PATIENT-LVL III: CPT | Mod: PBBFAC,,, | Performed by: PHYSICIAN ASSISTANT

## 2024-05-07 PROCEDURE — 3078F DIAST BP <80 MM HG: CPT | Mod: CPTII,S$GLB,, | Performed by: PHYSICIAN ASSISTANT

## 2024-05-07 PROCEDURE — 3074F SYST BP LT 130 MM HG: CPT | Mod: CPTII,S$GLB,, | Performed by: PHYSICIAN ASSISTANT
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
## 2024-05-10 ENCOUNTER — CLINICAL SUPPORT (OUTPATIENT)
Dept: REHABILITATION | Facility: HOSPITAL | Age: 64
End: 2024-05-10
Payer: COMMERCIAL

## 2024-05-10 DIAGNOSIS — M53.84 DECREASED ROM OF THORACIC SPINE: Primary | ICD-10-CM

## 2024-05-10 DIAGNOSIS — M54.6 ACUTE LEFT-SIDED THORACIC BACK PAIN: ICD-10-CM

## 2024-05-10 DIAGNOSIS — Z74.09 IMPAIRED FUNCTIONAL MOBILITY AND ACTIVITY TOLERANCE: ICD-10-CM

## 2024-05-10 PROCEDURE — 97110 THERAPEUTIC EXERCISES: CPT | Mod: PN

## 2024-05-10 NOTE — PROGRESS NOTES
[facility]   Physical Therapy Progress Note      Name: Vesta Tucker  Clinic Number: [id number]    Therapy Diagnosis:   Encounter Diagnoses   Name Primary?    Decreased ROM of thoracic spine Yes    Acute left-sided thoracic back pain     Impaired functional mobility and activity tolerance      Physician: Rachael Laws PA-C    Visit Date: 5/10/2024    Physician Orders: PT Eval and Treat   Medical Diagnosis from Referral:   M54.6 (ICD-10-CM) - Acute left-sided thoracic back pain   M54.14 (ICD-10-CM) - Thoracic radiculopathy      Evaluation Date: 3/13/2024  Authorization Period Expiration: 12/21/2024  Plan of Care Expiration: 7/10/2024  Visit # / Visits authorized: 14/20  PHYSICAL THERAPY POC: 7 visits after 5/10/2024  FOTO d/c last score 54 on 4/24/2024    Time In:  6:57am  Time Out:  7:45am  Total Time:  48 minutes      Precautions: Standard and dons AFOs bilaterally, 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 Gets forewarning of upward flow of blood feeling from thoracic spine and face flush sensation, pressure in her ears, and is able to sense forewarning of AD.    Subjective   HOME EXERCISE PROGRAM: reviewed, reports compliance   Response to previous treatment: mostly feels symptoms at area of shoulder blades bilaterally. Feeling like muscles are sore today due to passing a kidney stone last night.   Function:  continued limitation with daily routine inclusive of heavy lifting >#50 boxes from low points     Pain: 2/10  Location: bilateral thoracic spine    Objective    Observation: donning bilateral AFOs     Posture Alignment: increased lordosis; elevation of right shoulder     Sensation: Light touch: Impaired: anterior lower leg bilaterally     GAIT DEVIATIONS: Vesta ambulates with bilateral AFO and displays wide base of support decreased knee flexion in swing phase bilaterally     Cervical  "ROM: WFL for flexion, extension, B rotation  AROM 75% for bilateral sidebend with stretching to UT    5/10/2024:  Lumbar Range of Motion:     AROM  Pain   Flexion 75% stretch      Extension 25 degrees Yes and difficulty transitioning from flexion to neutral position without UE support      Right Side Bending 15 degrees Y to L side      Left Side Bending 17 degrees  Y with radicular symptoms from L thoracic to chest      Right Rotation 25% Y to L   Left Rotation 25% Y, worse than R rotation with pain to L side      Shoulder Range of Motion:   Shoulder Left Right   Flexion 159 165   Abduction 125* 165   ER Occiput  C7   IR T12 T10      Upper Extremity Strength  (R) UE   (L) UE     Shoulder elevation: 5/5 Shoulder elevation: 5/5   Shoulder flexion: 4-/5 Shoulder flexion: 4/5   Shoulder Abduction: 4-/5 Shoulder abduction: 4-/5   Shoulder ER 4/5 Shoulder ER 4/5   Shoulder IR 4+/5 Shoulder IR 4+/5   Lower Trap 3/5 Lower Trap 3-/5*   Middle Trap 3+/5 Middle Trap 3-/5   Rhomboids 3+/5 Rhomboids 3+/5*         Joint Mobility: hypomobility to mid thoracic spine with PA    Treatment     Bold = exercises performed  + = progressions    Therapeutic exercises to develop ROM, flexibility, and posture for 48 minutes:  Supine AAROM left shoulder with T bar 10 x 10s (PROM/AAROM manually initially for ROM and comfort)  Seated lumbar flexion with ball x 10  Standing QL stretch with sidebending 5 x 10s  Rows, green tubing, 3 x 10  Shoulder extension, green tubing, palms forward, 3 x 10  Cat/camel in quadruped x 15   Standing thoracic rotation at wall, 5" hold, x 15 each side   Wall deb, blue half foam along spine at wall, 2x15   Standing horizontal abduction, green band, 2-3" hold, 3x10  +LTR, LE s  on green ball with moist heat at back x 20   +B knee to chest, LE s on green ball x 15   +Seated thoracic ext, half foam in chair, 5-10"x20   +Updated PHYSICAL THERAPY POC    Neuromuscular re-education x 00 minutes to improve muscle " "activation and control:  Prone scap depression 20  Prone rows, +1# 3 x 10  Prone T +2 x 10  Prone shoulder extension 3 x 10 left  Prone lower trap lifts offs  3 x 10  Supine horizontal abduction, red band 2 x 10  Wall walk bilaterally with lift off 2 x 15  No money, +green band, 3" hold, 3 x 10   Serratus punch, supine 3#, 2x20  Diaphragmatic breathing with knees on leg elevator x 2 minutes: 4s inhale, 4s hold, 8s exhale    Functional therapeutic activities to improve functional performance for 00 minutes:  Lifting waist height  and carry 12.5# box with cues for proper body mechanics for work related tasks  Education on diaphragmatic breathing for management of symptoms  Education on safe BP parameters for exercise and activity and seeking medical attention   UBE, forward 3'/retro 3', 3.5 resistance     Manual therapy techniques: Joint mobilizations were applied to the: thoracic for 00 minutes:  STM bilateral thoracic paraspinals  L thoracic rib inhalation mobilization in sitting  Prone thoracic PAIVMs, Gr II/III- JM    Patient Education and Home Exercises       Education provided:   - Educated pt that he/she may feel soreness after session.    - Notify therapist of forewarning of AD symptoms    Home Exercises Provided: Instructed patient to continue current home exercise program.  Exercises were reviewed and Vesta was able to demonstrate them prior to the end of the session.  Vesta demonstrated good  understanding of the education provided. See Electronic Medical Record under Patient Instructions for exercises provided during therapy sessions    Assessment   Lumbar side bending and L shoulder flexion ACTIVE RANGE OF MOTION improved upon updated PHYSICAL THERAPY POC. Patient is able to complete progressions with appropriate level of challenge and proper mechanics. She will benefit from continued physical therapy intervention to progress strength, ROM, and activity tolerance, and decreased pain, toward goals set forth " in plan of care to improve functional mobility and quality of life.     Vesta is making good progress towards meeting set goals.    Patient prognosis is Fair.   Rehab potential: fair     Patient will continue to benefit from skilled outpatient physical therapy to address the deficits listed in the problem list box on initial evaluation, provide pt/family education and to maximize pt's level of independence in the home and community environment.      Anticipated barriers to physical therapy: co-morbidities     Goals:   Short Term Goals: 4 weeks progressing, not met, 5/10/2024  1.Report decreased in pain at worse less than  <   / =  5/10  to increase tolerance for functional activities   2. Pt to improve Back range of motion by 25% to allow for improved functional mobility to allow for improvement in IADLs.   3. Increased  BUE MMT 1/2  grade to increase tolerance for ADL and work activities.  4. Pt to report standing tolerance improved to 1-2 hours prior to requiring rest break.  5. Pt to tolerate HEP to improve ROM and independence with ADL's  6. Increased MMT for lower traps/middle traps/rhomboids to > or = 4-/5 to increase tolerance for ADL and improve posture.     Long Term Goals: 8 weeks progressing, not met, 5/10/2024  1.Report decreased in pain at worse less than  <   / =  2 /10  to increase tolerance for functional activities   2.Pt to improve range of motion by 50% to allow for improved functional mobility to allow for improvement in IADLs.   3.Increased BUE MMT 1 grade  to increase tolerance for ADL and work activities.  4.  Pt will report return to appropriate workout routine to maintain functional abilities.  5. Increased MMT  for lower traps/middle traps/rhomboids to > or = 4/5 to increase tolerance for ADL and improve posture.    Plan   Outpatient Physical Therapy 2 times weekly for 3-4 weeks to include the following interventions: Manual Therapy, Moist Heat/ Ice, Neuromuscular Re-ed, Patient Education,  Therapeutic Activities, and Therapeutic Exercise.     Physical therapist and physical therapy assistant(s) met face to face to discuss patient's treatment plan and progress towards established goals. Pt will be seen by a physical therapist minimally every 6th visit or every 30 days.    Radha Ricks, PT        I CERTIFY THE NEED FOR THESE SERVICES FURNISHED UNDER THIS PLAN OF TREATMENT AND WHILE UNDER MY CARE     Physician's comments:           Physician's Signature: ___________________________________________________

## 2024-05-13 NOTE — PROGRESS NOTES
[facility]   Physical Therapy Treatment Note      Name: Vesta Tucker  Clinic Number: [id number]    Therapy Diagnosis:   Encounter Diagnoses   Name Primary?    Decreased ROM of thoracic spine Yes    Acute left-sided thoracic back pain     Impaired functional mobility and activity tolerance      Physician: Rachael Laws PA-C    Visit Date: 5/14/2024    Physician Orders: PT Eval and Treat   Medical Diagnosis from Referral:   M54.6 (ICD-10-CM) - Acute left-sided thoracic back pain   M54.14 (ICD-10-CM) - Thoracic radiculopathy      Evaluation Date: 3/13/2024  Authorization Period Expiration: 12/21/2024  Plan of Care Expiration: 7/10/2024  Visit # / Visits authorized: 15/20  PHYSICAL THERAPY POC: 7 visits after 5/10/2024  FOTO d/c last score 54 on 4/24/2024    Time In: 1:06pm  Time Out:  1:49pm  Total Time: 43 minutes      Precautions: Standard and dons AFOs bilaterally, 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 Gets forewarning of upward flow of blood feeling from thoracic spine and face flush sensation, pressure in her ears, and is able to sense forewarning of AD.    Subjective   HOME EXERCISE PROGRAM: reviewed, reports compliance   Response to previous treatment: feels some tightness at her back but is going to doctor to make sure she actually passed her kidney stone because she has had to get it lasered in the past.   Function:  continued limitation with daily routine inclusive of heavy lifting >#50 boxes from low points, but notes proper implementation of pivoting while maneuvering boxes     Pain: 2/10  Location: bilateral thoracic spine    Objective    Observation: donning bilateral AFOs     Posture Alignment: increased lordosis; elevation of right shoulder     Sensation: Light touch: Impaired: anterior lower leg bilaterally     GAIT DEVIATIONS: Vesta ambulates with bilateral AFO and displays  "wide base of support decreased knee flexion in swing phase bilaterally     Cervical ROM: WFL for flexion, extension, B rotation  AROM 75% for bilateral sidebend with stretching to UT    5/10/2024:  Lumbar Range of Motion:     AROM  Pain   Flexion 75% stretch      Extension 25 degrees Yes and difficulty transitioning from flexion to neutral position without UE support      Right Side Bending 15 degrees Y to L side      Left Side Bending 17 degrees  Y with radicular symptoms from L thoracic to chest      Right Rotation 25% Y to L   Left Rotation 25% Y, worse than R rotation with pain to L side      Shoulder Range of Motion:   Shoulder Left Right   Flexion 159 165   Abduction 125* 165   ER Occiput  C7   IR T12 T10      Upper Extremity Strength  (R) UE   (L) UE     Shoulder elevation: 5/5 Shoulder elevation: 5/5   Shoulder flexion: 4-/5 Shoulder flexion: 4/5   Shoulder Abduction: 4-/5 Shoulder abduction: 4-/5   Shoulder ER 4/5 Shoulder ER 4/5   Shoulder IR 4+/5 Shoulder IR 4+/5   Lower Trap 3/5 Lower Trap 3-/5*   Middle Trap 3+/5 Middle Trap 3-/5   Rhomboids 3+/5 Rhomboids 3+/5*         Joint Mobility: hypomobility to mid thoracic spine with PA    Treatment     Bold = exercises performed  + = progressions    Therapeutic exercises to develop ROM, flexibility, and posture for 28 minutes:  Rows, green tubing, 3 x 10  Shoulder extension, green tubing, palms forward, 3 x 10  Cat/camel in quadruped x 15   Standing thoracic rotation at wall, 5" hold, x 15 each side   Wall deb, blue half foam along spine at wall, 2x15   Seated horizontal abduction, green band, 2-3" hold, 3x10  LTR, LE s  on green ball with moist heat at back x 20   B knee to chest, LE s on green ball, 3-5" hold x 15   Seated thoracic ext, half foam in chair, 10"x 10     Neuromuscular re-education x 15 minutes to improve muscle activation and control:  Supine horizontal abduction, red band 2 x 10  Wall walk bilaterally with lift off 2 x 15  +Standing Pallof " press, green tubing, 3x10 each way  +Diagonal pull from low point to high point with red tubing, knees slightly bent, band moving from knee towards opposite shoulder, 3x5 each way    Functional therapeutic activities to improve functional performance for 00 minutes:  UBE, forward 3'/retro 3', 3.5 resistance     Manual therapy techniques: Joint mobilizations were applied to the: thoracic for 00 minutes:  STM bilateral thoracic paraspinals  L thoracic rib inhalation mobilization in sitting  Prone thoracic PAIVMs, Gr II/III-     Patient Education and Home Exercises       Education provided:   - Educated pt that he/she may feel soreness after session.    - Notify therapist of forewarning of AD symptoms    Home Exercises Provided: Instructed patient to continue current home exercise program.  Exercises were reviewed and Vesta was able to demonstrate them prior to the end of the session.  Vesta demonstrated good  understanding of the education provided. See Electronic Medical Record under Patient Instructions for exercises provided during therapy sessions    Assessment   Functional lifting diagonal with tubing and Pallof press with tubing implemented today to strengthen core during functional lift, pivot/twisting movements. Patient is able to complete progressions with appropriate level of challenge and proper mechanics. She will benefit from continued physical therapy intervention to progress strength, ROM, and activity tolerance, and decreased pain, toward goals set forth in plan of care to improve functional mobility and quality of life.     Vesta is making good progress towards meeting set goals.    Patient prognosis is Fair.   Rehab potential: fair     Patient will continue to benefit from skilled outpatient physical therapy to address the deficits listed in the problem list box on initial evaluation, provide pt/family education and to maximize pt's level of independence in the home and community environment.       Anticipated barriers to physical therapy: co-morbidities     Goals:   Short Term Goals: 4 weeks progressing, not met, 5/10/2024  1.Report decreased in pain at worse less than  <   / =  5/10  to increase tolerance for functional activities   2. Pt to improve Back range of motion by 25% to allow for improved functional mobility to allow for improvement in IADLs.   3. Increased  BUE MMT 1/2  grade to increase tolerance for ADL and work activities.  4. Pt to report standing tolerance improved to 1-2 hours prior to requiring rest break.  5. Pt to tolerate HEP to improve ROM and independence with ADL's  6. Increased MMT for lower traps/middle traps/rhomboids to > or = 4-/5 to increase tolerance for ADL and improve posture.     Long Term Goals: 8 weeks progressing, not met, 5/10/2024  1.Report decreased in pain at worse less than  <   / =  2 /10  to increase tolerance for functional activities   2.Pt to improve range of motion by 50% to allow for improved functional mobility to allow for improvement in IADLs.   3.Increased BUE MMT 1 grade  to increase tolerance for ADL and work activities.  4.  Pt will report return to appropriate workout routine to maintain functional abilities.  5. Increased MMT  for lower traps/middle traps/rhomboids to > or = 4/5 to increase tolerance for ADL and improve posture.    Plan   Outpatient Physical Therapy to include the following interventions: Manual Therapy, Moist Heat/ Ice, Neuromuscular Re-ed, Patient Education, Therapeutic Activities, and Therapeutic Exercise.     Physical therapist and physical therapy assistant(s) met face to face to discuss patient's treatment plan and progress towards established goals. Pt will be seen by a physical therapist minimally every 6th visit or every 30 days.    Radha Ricks, PT

## 2024-05-13 NOTE — PLAN OF CARE
[facility]   Physical Therapy Progress Note      Name: Vesta Tucker  Clinic Number: [id number]    Therapy Diagnosis:   Encounter Diagnoses   Name Primary?    Decreased ROM of thoracic spine Yes    Acute left-sided thoracic back pain     Impaired functional mobility and activity tolerance      Physician: Rachael Laws PA-C    Visit Date: 5/10/2024    Physician Orders: PT Eval and Treat   Medical Diagnosis from Referral:   M54.6 (ICD-10-CM) - Acute left-sided thoracic back pain   M54.14 (ICD-10-CM) - Thoracic radiculopathy      Evaluation Date: 3/13/2024  Authorization Period Expiration: 12/21/2024  Plan of Care Expiration: 7/10/2024  Visit # / Visits authorized: 14/20  PHYSICAL THERAPY POC: 7 visits after 5/10/2024  FOTO d/c last score 54 on 4/24/2024    Time In:  6:57am  Time Out:  7:45am  Total Time:  48 minutes      Precautions: Standard and dons AFOs bilaterally, 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 Gets forewarning of upward flow of blood feeling from thoracic spine and face flush sensation, pressure in her ears, and is able to sense forewarning of AD.    Subjective   HOME EXERCISE PROGRAM: reviewed, reports compliance   Response to previous treatment: mostly feels symptoms at area of shoulder blades bilaterally. Feeling like muscles are sore today due to passing a kidney stone last night.   Function:  continued limitation with daily routine inclusive of heavy lifting >#50 boxes from low points     Pain: 2/10  Location: bilateral thoracic spine    Objective    Observation: donning bilateral AFOs     Posture Alignment: increased lordosis; elevation of right shoulder     Sensation: Light touch: Impaired: anterior lower leg bilaterally     GAIT DEVIATIONS: Vesta ambulates with bilateral AFO and displays wide base of support decreased knee flexion in swing phase bilaterally     Cervical  "ROM: WFL for flexion, extension, B rotation  AROM 75% for bilateral sidebend with stretching to UT    5/10/2024:  Lumbar Range of Motion:     AROM  Pain   Flexion 75% stretch      Extension 25 degrees Yes and difficulty transitioning from flexion to neutral position without UE support      Right Side Bending 15 degrees Y to L side      Left Side Bending 17 degrees  Y with radicular symptoms from L thoracic to chest      Right Rotation 25% Y to L   Left Rotation 25% Y, worse than R rotation with pain to L side      Shoulder Range of Motion:   Shoulder Left Right   Flexion 159 165   Abduction 125* 165   ER Occiput  C7   IR T12 T10      Upper Extremity Strength  (R) UE   (L) UE     Shoulder elevation: 5/5 Shoulder elevation: 5/5   Shoulder flexion: 4-/5 Shoulder flexion: 4/5   Shoulder Abduction: 4-/5 Shoulder abduction: 4-/5   Shoulder ER 4/5 Shoulder ER 4/5   Shoulder IR 4+/5 Shoulder IR 4+/5   Lower Trap 3/5 Lower Trap 3-/5*   Middle Trap 3+/5 Middle Trap 3-/5   Rhomboids 3+/5 Rhomboids 3+/5*         Joint Mobility: hypomobility to mid thoracic spine with PA    Treatment     Bold = exercises performed  + = progressions    Therapeutic exercises to develop ROM, flexibility, and posture for 48 minutes:  Supine AAROM left shoulder with T bar 10 x 10s (PROM/AAROM manually initially for ROM and comfort)  Seated lumbar flexion with ball x 10  Standing QL stretch with sidebending 5 x 10s  Rows, green tubing, 3 x 10  Shoulder extension, green tubing, palms forward, 3 x 10  Cat/camel in quadruped x 15   Standing thoracic rotation at wall, 5" hold, x 15 each side   Wall deb, blue half foam along spine at wall, 2x15   Standing horizontal abduction, green band, 2-3" hold, 3x10  +LTR, LE s  on green ball with moist heat at back x 20   +B knee to chest, LE s on green ball x 15   +Seated thoracic ext, half foam in chair, 5-10"x20   +Updated PHYSICAL THERAPY POC    Neuromuscular re-education x 00 minutes to improve muscle " "activation and control:  Prone scap depression 20  Prone rows, +1# 3 x 10  Prone T +2 x 10  Prone shoulder extension 3 x 10 left  Prone lower trap lifts offs  3 x 10  Supine horizontal abduction, red band 2 x 10  Wall walk bilaterally with lift off 2 x 15  No money, +green band, 3" hold, 3 x 10   Serratus punch, supine 3#, 2x20  Diaphragmatic breathing with knees on leg elevator x 2 minutes: 4s inhale, 4s hold, 8s exhale    Functional therapeutic activities to improve functional performance for 00 minutes:  Lifting waist height  and carry 12.5# box with cues for proper body mechanics for work related tasks  Education on diaphragmatic breathing for management of symptoms  Education on safe BP parameters for exercise and activity and seeking medical attention   UBE, forward 3'/retro 3', 3.5 resistance     Manual therapy techniques: Joint mobilizations were applied to the: thoracic for 00 minutes:  STM bilateral thoracic paraspinals  L thoracic rib inhalation mobilization in sitting  Prone thoracic PAIVMs, Gr II/III- JM    Patient Education and Home Exercises       Education provided:   - Educated pt that he/she may feel soreness after session.    - Notify therapist of forewarning of AD symptoms    Home Exercises Provided: Instructed patient to continue current home exercise program.  Exercises were reviewed and Vesta was able to demonstrate them prior to the end of the session.  Vesta demonstrated good  understanding of the education provided. See Electronic Medical Record under Patient Instructions for exercises provided during therapy sessions    Assessment   Lumbar side bending and L shoulder flexion ACTIVE RANGE OF MOTION improved upon updated PHYSICAL THERAPY POC. Patient is able to complete progressions with appropriate level of challenge and proper mechanics. She will benefit from continued physical therapy intervention to progress strength, ROM, and activity tolerance, and decreased pain, toward goals set forth " in plan of care to improve functional mobility and quality of life.     Vesta is making good progress towards meeting set goals.    Patient prognosis is Fair.   Rehab potential: fair     Patient will continue to benefit from skilled outpatient physical therapy to address the deficits listed in the problem list box on initial evaluation, provide pt/family education and to maximize pt's level of independence in the home and community environment.      Anticipated barriers to physical therapy: co-morbidities     Goals:   Short Term Goals: 4 weeks progressing, not met, 5/10/2024  1.Report decreased in pain at worse less than  <   / =  5/10  to increase tolerance for functional activities   2. Pt to improve Back range of motion by 25% to allow for improved functional mobility to allow for improvement in IADLs.   3. Increased  BUE MMT 1/2  grade to increase tolerance for ADL and work activities.  4. Pt to report standing tolerance improved to 1-2 hours prior to requiring rest break.  5. Pt to tolerate HEP to improve ROM and independence with ADL's  6. Increased MMT for lower traps/middle traps/rhomboids to > or = 4-/5 to increase tolerance for ADL and improve posture.     Long Term Goals: 8 weeks progressing, not met, 5/10/2024  1.Report decreased in pain at worse less than  <   / =  2 /10  to increase tolerance for functional activities   2.Pt to improve range of motion by 50% to allow for improved functional mobility to allow for improvement in IADLs.   3.Increased BUE MMT 1 grade  to increase tolerance for ADL and work activities.  4.  Pt will report return to appropriate workout routine to maintain functional abilities.  5. Increased MMT  for lower traps/middle traps/rhomboids to > or = 4/5 to increase tolerance for ADL and improve posture.    Plan   Outpatient Physical Therapy 2 times weekly for 3-4 weeks to include the following interventions: Manual Therapy, Moist Heat/ Ice, Neuromuscular Re-ed, Patient Education,  Therapeutic Activities, and Therapeutic Exercise.     Physical therapist and physical therapy assistant(s) met face to face to discuss patient's treatment plan and progress towards established goals. Pt will be seen by a physical therapist minimally every 6th visit or every 30 days.    Radha Ricks, PT        I CERTIFY THE NEED FOR THESE SERVICES FURNISHED UNDER THIS PLAN OF TREATMENT AND WHILE UNDER MY CARE     Physician's comments:           Physician's Signature: ___________________________________________________

## 2024-05-14 ENCOUNTER — CLINICAL SUPPORT (OUTPATIENT)
Dept: REHABILITATION | Facility: HOSPITAL | Age: 64
End: 2024-05-14
Payer: COMMERCIAL

## 2024-05-14 DIAGNOSIS — M53.84 DECREASED ROM OF THORACIC SPINE: Primary | ICD-10-CM

## 2024-05-14 DIAGNOSIS — Z74.09 IMPAIRED FUNCTIONAL MOBILITY AND ACTIVITY TOLERANCE: ICD-10-CM

## 2024-05-14 DIAGNOSIS — M54.6 ACUTE LEFT-SIDED THORACIC BACK PAIN: ICD-10-CM

## 2024-05-14 PROCEDURE — 97110 THERAPEUTIC EXERCISES: CPT | Mod: PN

## 2024-05-14 PROCEDURE — 97112 NEUROMUSCULAR REEDUCATION: CPT | Mod: PN

## 2024-05-16 NOTE — PROGRESS NOTES
[facility]   Physical Therapy Treatment Note      Name: Vesta Tucker  Clinic Number: [id number]    Therapy Diagnosis:   Encounter Diagnoses   Name Primary?    Decreased ROM of thoracic spine Yes    Acute left-sided thoracic back pain     Impaired functional mobility and activity tolerance        Physician: Rachael Laws PA-C    Visit Date: 5/17/2024    Physician Orders: PT Eval and Treat   Medical Diagnosis from Referral:   M54.6 (ICD-10-CM) - Acute left-sided thoracic back pain   M54.14 (ICD-10-CM) - Thoracic radiculopathy      Evaluation Date: 3/13/2024  Authorization Period Expiration: 12/21/2024  Plan of Care Expiration: 7/10/2024  Visit # / Visits authorized: 16/20  PHYSICAL THERAPY POC: 7 visits after 5/10/2024 (2/7)  FOTO d/c last score 54 on 4/24/2024    Time In: 0650   Time Out: 0750   Total Time: 60 minutes  Total Billable Time: 55 minutes     Precautions: Standard and dons AFOs bilaterally, 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 Gets forewarning of upward flow of blood feeling from thoracic spine and face flush sensation, pressure in her ears, and is able to sense forewarning of AD.    Subjective   HOME EXERCISE PROGRAM: reviewed, reports compliance   Response to previous treatment: still feeling some tightness at her back 2* passing kidney stone. Has an MD appt in a month but is going to message MD to see if he can see her sooner.   Function:  continued limitation with daily routine inclusive of heavy lifting >#50 boxes from low points, but notes proper implementation of pivoting while maneuvering boxes     Pain: 2/10  Location: bilateral thoracic spine    Objective      Not performed this date:  Observation: donning bilateral AFOs     Posture Alignment: increased lordosis; elevation of right shoulder     Sensation: Light touch: Impaired: anterior lower leg bilaterally     GAIT  "DEVIATIONS: Vesta ambulates with bilateral AFO and displays wide base of support decreased knee flexion in swing phase bilaterally     Cervical ROM: WFL for flexion, extension, B rotation  AROM 75% for bilateral sidebend with stretching to UT    5/10/2024:  Lumbar Range of Motion:     AROM  Pain   Flexion 75% stretch      Extension 25 degrees Yes and difficulty transitioning from flexion to neutral position without UE support      Right Side Bending 15 degrees Y to L side      Left Side Bending 17 degrees  Y with radicular symptoms from L thoracic to chest      Right Rotation 25% Y to L   Left Rotation 25% Y, worse than R rotation with pain to L side      Shoulder Range of Motion:   Shoulder Left Right   Flexion 159 165   Abduction 125* 165   ER Occiput  C7   IR T12 T10      Upper Extremity Strength  (R) UE   (L) UE     Shoulder elevation: 5/5 Shoulder elevation: 5/5   Shoulder flexion: 4-/5 Shoulder flexion: 4/5   Shoulder Abduction: 4-/5 Shoulder abduction: 4-/5   Shoulder ER 4/5 Shoulder ER 4/5   Shoulder IR 4+/5 Shoulder IR 4+/5   Lower Trap 3/5 Lower Trap 3-/5*   Middle Trap 3+/5 Middle Trap 3-/5   Rhomboids 3+/5 Rhomboids 3+/5*         Joint Mobility: hypomobility to mid thoracic spine with PA    Treatment     Bold = exercises performed  + = progressions    Therapeutic exercises to develop ROM, flexibility, and posture for 27 minutes:  Rows, green tubing, 3 x 10  +High rows, green tubing 2 x 10  Shoulder extension, green tubing, palms forward, 3 x 10  Cat/camel in quadruped x 15   Standing thoracic rotation at wall, 5" hold, x 15 each side   Wall deb, blue half foam along spine at wall, 2x15   Seated horizontal abduction, green band, 2-3" hold, 3x10  LTR, LEs on green ball x 20   B knee to chest, LE s on green ball, 3-5" hold x 20   Seated thoracic ext, half foam in chair, 10"x 10     Neuromuscular re-education x 20 minutes to improve muscle activation and control:  Supine horizontal abduction, red band 2 x " 10  Wall walk bilaterally with lift off 2 x 15  Standing Pallof press, green tubing, 3x10 each way  Diagonal pull from low point to high point with red tubing, knees slightly bent, band moving from knee towards opposite shoulder, 3x5 each way    Functional therapeutic activities to improve functional performance for 08 minutes:  UBE, forward 3'/retro 3', 3.5 resistance  Education re: safety as pt stated she was considering purchasing some form of heat for application to her low back. Instruction to not apply heat at home 2* sensory loss with SCI and inability to monitor safely, and to only have applied in clinic with therapist supervision.    Review of home exercise program with addition of thoracic extension over back of chair when she finds a suitable chair.      Manual therapy techniques: Joint mobilizations were applied to the: thoracic for 00 minutes:  STM bilateral thoracic paraspinals  L thoracic rib inhalation mobilization in sitting  Prone thoracic PAIVMs, Gr II/III- JM    Patient Education and Home Exercises       Education provided:   - Educated pt that he/she may feel soreness after session.    - Notify therapist of forewarning of AD symptoms    Home Exercises Provided: Instructed patient to continue current home exercise program.  Exercises were reviewed and Vesta was able to demonstrate them prior to the end of the session.  Vesta demonstrated good  understanding of the education provided. See Electronic Medical Record under Patient Instructions for exercises provided during therapy sessions    Assessment     Continued back discomfort. Difficulty sustaining proper form for paloff press, as her bilateral knees drift anteriorly, causing LOB with self-correction. Frequent rest periods 2* fatigue but improved pain reported after session. Occasional cues for proper form and muscle activation and control. She will benefit from continued physical therapy intervention to progress strength, ROM, and activity  tolerance, and decreased pain, toward goals set forth in plan of care to improve functional mobility and quality of life.     Vesta is making good progress towards meeting set goals.    Patient prognosis is Fair.   Rehab potential: fair     Patient will continue to benefit from skilled outpatient physical therapy to address the deficits listed in the problem list box on initial evaluation, provide pt/family education and to maximize pt's level of independence in the home and community environment.      Anticipated barriers to physical therapy: co-morbidities     Goals:   Short Term Goals: 4 weeks progressing, not met, 5/10/2024  1.Report decreased in pain at worse less than  <   / =  5/10  to increase tolerance for functional activities   2. Pt to improve Back range of motion by 25% to allow for improved functional mobility to allow for improvement in IADLs.   3. Increased  BUE MMT 1/2  grade to increase tolerance for ADL and work activities.  4. Pt to report standing tolerance improved to 1-2 hours prior to requiring rest break.  5. Pt to tolerate HEP to improve ROM and independence with ADL's  6. Increased MMT for lower traps/middle traps/rhomboids to > or = 4-/5 to increase tolerance for ADL and improve posture.     Long Term Goals: 8 weeks progressing, not met, 5/10/2024  1.Report decreased in pain at worse less than  <   / =  2 /10  to increase tolerance for functional activities   2.Pt to improve range of motion by 50% to allow for improved functional mobility to allow for improvement in IADLs.   3.Increased BUE MMT 1 grade  to increase tolerance for ADL and work activities.  4.  Pt will report return to appropriate workout routine to maintain functional abilities.  5. Increased MMT  for lower traps/middle traps/rhomboids to > or = 4/5 to increase tolerance for ADL and improve posture.    Plan     Continue per POC, progressing as appropriate to achieve stated goals.    Continue with: Outpatient Physical  Therapy 2 times weekly for 3-4 weeks to include the following interventions: Manual Therapy, Moist Heat/ Ice, Neuromuscular Re-ed, Patient Education, Therapeutic Activities, and Therapeutic Exercise.     Physical therapist and physical therapy assistant(s) met face to face to discuss patient's treatment plan and progress towards established goals. Pt will be seen by a physical therapist minimally every 6th visit or every 30 days.    Alfreda Yoder, PTA

## 2024-05-17 ENCOUNTER — CLINICAL SUPPORT (OUTPATIENT)
Dept: REHABILITATION | Facility: HOSPITAL | Age: 64
End: 2024-05-17
Payer: COMMERCIAL

## 2024-05-17 DIAGNOSIS — M53.84 DECREASED ROM OF THORACIC SPINE: Primary | ICD-10-CM

## 2024-05-17 DIAGNOSIS — M54.6 ACUTE LEFT-SIDED THORACIC BACK PAIN: ICD-10-CM

## 2024-05-17 DIAGNOSIS — Z74.09 IMPAIRED FUNCTIONAL MOBILITY AND ACTIVITY TOLERANCE: ICD-10-CM

## 2024-05-17 PROCEDURE — 97110 THERAPEUTIC EXERCISES: CPT | Mod: PN,CQ

## 2024-05-17 PROCEDURE — 97112 NEUROMUSCULAR REEDUCATION: CPT | Mod: PN,CQ

## 2024-05-17 PROCEDURE — 97530 THERAPEUTIC ACTIVITIES: CPT | Mod: PN,CQ

## 2024-05-21 NOTE — PROGRESS NOTES
[facility]   Physical Therapy Treatment Note      Name: Vesta Tucker  Clinic Number: [id number]    Therapy Diagnosis:   Encounter Diagnoses   Name Primary?    Decreased ROM of thoracic spine Yes    Acute left-sided thoracic back pain     Impaired functional mobility and activity tolerance        Physician: Rachael Laws PA-C    Visit Date: 5/22/2024    Physician Orders: PT Eval and Treat   Medical Diagnosis from Referral:   M54.6 (ICD-10-CM) - Acute left-sided thoracic back pain   M54.14 (ICD-10-CM) - Thoracic radiculopathy      Evaluation Date: 3/13/2024  Authorization Period Expiration: 12/21/2024  Plan of Care Expiration: 7/10/2024  Visit # / Visits authorized: 17/20  PHYSICAL THERAPY POC: 7 visits after 5/10/2024 (3/7)  FOTO d/c last score 54 on 4/24/2024    Time In: 0735   Time Out: 0846   Total Time: 71 minutes  Total Billable Time: 69 minutes     Precautions: Standard and dons AFOs bilaterally, 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 Gets forewarning of upward flow of blood feeling from thoracic spine and face flush sensation, pressure in her ears, and is able to sense forewarning of AD.    Subjective   HOME EXERCISE PROGRAM: reviewed, reports compliance   Response to previous treatment: feeling better   Function: no pain with elliptical     Pain: 0/10  Location: bilateral thoracic spine    Objective      Not performed this date:  Observation: donning bilateral AFOs     Posture Alignment: increased lordosis; elevation of right shoulder     Sensation: Light touch: Impaired: anterior lower leg bilaterally     GAIT DEVIATIONS: Vesta ambulates with bilateral AFO and displays wide base of support decreased knee flexion in swing phase bilaterally     Cervical ROM: WFL for flexion, extension, B rotation  AROM 75% for bilateral sidebend with stretching to UT    5/10/2024:  Lumbar Range of  "Motion:     AROM  Pain   Flexion 75% stretch      Extension 25 degrees Yes and difficulty transitioning from flexion to neutral position without UE support      Right Side Bending 15 degrees Y to L side      Left Side Bending 17 degrees  Y with radicular symptoms from L thoracic to chest      Right Rotation 25% Y to L   Left Rotation 25% Y, worse than R rotation with pain to L side      Shoulder Range of Motion:   Shoulder Left Right   Flexion 159 165   Abduction 125* 165   ER Occiput  C7   IR T12 T10      Upper Extremity Strength  (R) UE   (L) UE     Shoulder elevation: 5/5 Shoulder elevation: 5/5   Shoulder flexion: 4-/5 Shoulder flexion: 4/5   Shoulder Abduction: 4-/5 Shoulder abduction: 4-/5   Shoulder ER 4/5 Shoulder ER 4/5   Shoulder IR 4+/5 Shoulder IR 4+/5   Lower Trap 3/5 Lower Trap 3-/5*   Middle Trap 3+/5 Middle Trap 3-/5   Rhomboids 3+/5 Rhomboids 3+/5*         Joint Mobility: hypomobility to mid thoracic spine with PA    Treatment     Bold = exercises performed  + = progressions    Therapeutic exercises to develop ROM, flexibility, and posture for 31 minutes:  Rows, +black tubing, 3 x 10  High rows, green tubing +3 x 10  Shoulder extension, green tubing, palms forward, 3 x 10  Cat/camel in quadruped x 15   Standing thoracic rotation at wall, 5" hold, x 15 each side   Wall deb, blue half foam along spine at wall, 2x15   Seated horizontal abduction, green band, 2-3" hold, 3x10  LTR, LEs on green ball x 20   B knee to chest, LE s on green ball, 3-5" hold x 20   Seated thoracic ext, half foam in chair, 10"x 10     Neuromuscular re-education x 38 minutes to improve muscle activation and control:  Supine horizontal abduction, red band 2 x 10  Wall walk bilaterally with lift off 2 x 15  Standing Pallof press, green tubing, 3x10 each way  Diagonal pull from low point to high point with +7# cable cross, knees slightly bent, band moving from knee towards opposite shoulder, 2 x 10 each way  Diagonal pull from " high point to low with 7# cable cross, knees slightly bent, band moving from shoulder to opposite knee, 2 x 10 each way  +Shoulder flexion lifting green physioball with 1/2 roll behind back on wall x 20    Functional therapeutic activities to improve functional performance for 00 minutes:  UBE, forward 3'/retro 3', 3.5 resistance  Education re: safety as pt stated she was considering purchasing some form of heat for application to her low back. Instruction to not apply heat at home 2* sensory loss with SCI and inability to monitor safely, and to only have applied in clinic with therapist supervision.    Review of home exercise program with addition of thoracic extension over back of chair when she finds a suitable chair.      Manual therapy techniques: Joint mobilizations were applied to the: thoracic for 00 minutes:  STM bilateral thoracic paraspinals  L thoracic rib inhalation mobilization in sitting  Prone thoracic PAIVMs, Gr II/III-     Patient Education and Home Exercises       Education provided:   - Educated pt that he/she may feel soreness after session.    - Notify therapist of forewarning of AD symptoms    Home Exercises Provided: Instructed patient to continue current home exercise program.  Exercises were reviewed and Vesta was able to demonstrate them prior to the end of the session.  Vesta demonstrated good  understanding of the education provided. See Electronic Medical Record under Patient Instructions for exercises provided during therapy sessions    Assessment     Improved pain since last session and able to progress strengthening without pain provocation. Cues for improved ROM and longer holds. Progressed function strengthening with good response with occasional manual cues for increased scapular activation and UT relaxation with good correction. Improving balance with standing activities and improved thoracic posture with standing and sitting. She will benefit from continued physical therapy  intervention to progress strength, ROM, and activity tolerance, and decreased pain, toward goals set forth in plan of care to improve functional mobility and quality of life.     Vesta is making good progress towards meeting set goals.    Patient prognosis is Fair.   Rehab potential: fair     Patient will continue to benefit from skilled outpatient physical therapy to address the deficits listed in the problem list box on initial evaluation, provide pt/family education and to maximize pt's level of independence in the home and community environment.      Anticipated barriers to physical therapy: co-morbidities     Goals:   Short Term Goals: 4 weeks progressing, not met, 5/10/2024  1.Report decreased in pain at worse less than  <   / =  5/10  to increase tolerance for functional activities   2. Pt to improve Back range of motion by 25% to allow for improved functional mobility to allow for improvement in IADLs.   3. Increased  BUE MMT 1/2  grade to increase tolerance for ADL and work activities.  4. Pt to report standing tolerance improved to 1-2 hours prior to requiring rest break.  5. Pt to tolerate HEP to improve ROM and independence with ADL's  6. Increased MMT for lower traps/middle traps/rhomboids to > or = 4-/5 to increase tolerance for ADL and improve posture.     Long Term Goals: 8 weeks progressing, not met, 5/10/2024  1.Report decreased in pain at worse less than  <   / =  2 /10  to increase tolerance for functional activities   2.Pt to improve range of motion by 50% to allow for improved functional mobility to allow for improvement in IADLs.   3.Increased BUE MMT 1 grade  to increase tolerance for ADL and work activities.  4.  Pt will report return to appropriate workout routine to maintain functional abilities.  5. Increased MMT  for lower traps/middle traps/rhomboids to > or = 4/5 to increase tolerance for ADL and improve posture.    Plan     Continue per POC, progressing as appropriate to achieve  stated goals.    Continue with: Outpatient Physical Therapy 2 times weekly for 3-4 weeks to include the following interventions: Manual Therapy, Moist Heat/ Ice, Neuromuscular Re-ed, Patient Education, Therapeutic Activities, and Therapeutic Exercise.     Physical therapist and physical therapy assistant(s) met face to face to discuss patient's treatment plan and progress towards established goals. Pt will be seen by a physical therapist minimally every 6th visit or every 30 days.    Alfreda Yoder, PTA

## 2024-05-22 ENCOUNTER — CLINICAL SUPPORT (OUTPATIENT)
Dept: REHABILITATION | Facility: HOSPITAL | Age: 64
End: 2024-05-22
Payer: COMMERCIAL

## 2024-05-22 DIAGNOSIS — M53.84 DECREASED ROM OF THORACIC SPINE: Primary | ICD-10-CM

## 2024-05-22 DIAGNOSIS — M54.6 ACUTE LEFT-SIDED THORACIC BACK PAIN: ICD-10-CM

## 2024-05-22 DIAGNOSIS — Z74.09 IMPAIRED FUNCTIONAL MOBILITY AND ACTIVITY TOLERANCE: ICD-10-CM

## 2024-05-22 PROCEDURE — 97110 THERAPEUTIC EXERCISES: CPT | Mod: PN,CQ

## 2024-05-22 PROCEDURE — 97112 NEUROMUSCULAR REEDUCATION: CPT | Mod: PN,CQ

## 2024-05-23 NOTE — PROGRESS NOTES
[facility]   Physical Therapy Treatment Note      Name: Vesta Tucker  Clinic Number: [id number]    Therapy Diagnosis:   Encounter Diagnoses   Name Primary?    Decreased ROM of thoracic spine Yes    Acute left-sided thoracic back pain     Impaired functional mobility and activity tolerance        Physician: Rachael Laws PA-C    Visit Date: 5/24/2024    Physician Orders: PT Eval and Treat   Medical Diagnosis from Referral:   M54.6 (ICD-10-CM) - Acute left-sided thoracic back pain   M54.14 (ICD-10-CM) - Thoracic radiculopathy      Evaluation Date: 3/13/2024  Authorization Period Expiration: 12/21/2024  Plan of Care Expiration: 7/10/2024  Visit # / Visits authorized: 18/20  PHYSICAL THERAPY POC: 7 visits after 5/10/2024 (4/7)  FOTO d/c last score 54 on 4/24/2024    Time In: 0732   Time Out: 0820   Total Time: 48 minutes  Total Billable Time: 48 minutes     Precautions: Standard and dons AFOs bilaterally, 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 Gets forewarning of upward flow of blood feeling from thoracic spine and face flush sensation, pressure in her ears, and is able to sense forewarning of AD.    Subjective   HOME EXERCISE PROGRAM: reviewed, reports compliance Once daily.   Response to previous treatment: feeling better   Function: no pain with elliptical or gardening    Pain: 0/10  Location: bilateral thoracic spine    Objective      Not performed this date:  Observation: donning bilateral AFOs     Posture Alignment: increased lordosis; elevation of right shoulder     Sensation: Light touch: Impaired: anterior lower leg bilaterally     GAIT DEVIATIONS: Vesta ambulates with bilateral AFO and displays wide base of support decreased knee flexion in swing phase bilaterally     Cervical ROM: WFL for flexion, extension, B rotation  AROM 75% for bilateral sidebend with stretching to  "UT    5/10/2024:  Lumbar Range of Motion:     AROM  Pain   Flexion 75% stretch      Extension 25 degrees Yes and difficulty transitioning from flexion to neutral position without UE support      Right Side Bending 15 degrees Y to L side      Left Side Bending 17 degrees  Y with radicular symptoms from L thoracic to chest      Right Rotation 25% Y to L   Left Rotation 25% Y, worse than R rotation with pain to L side      Shoulder Range of Motion:   Shoulder Left Right   Flexion 159 165   Abduction 125* 165   ER Occiput  C7   IR T12 T10      Upper Extremity Strength  (R) UE   (L) UE     Shoulder elevation: 5/5 Shoulder elevation: 5/5   Shoulder flexion: 4-/5 Shoulder flexion: 4/5   Shoulder Abduction: 4-/5 Shoulder abduction: 4-/5   Shoulder ER 4/5 Shoulder ER 4/5   Shoulder IR 4+/5 Shoulder IR 4+/5   Lower Trap 3/5 Lower Trap 3-/5*   Middle Trap 3+/5 Middle Trap 3-/5   Rhomboids 3+/5 Rhomboids 3+/5*         Joint Mobility: hypomobility to mid thoracic spine with PA    Treatment     Bold = exercises performed  + = progressions    Therapeutic exercises to develop ROM, flexibility, and posture for 23 minutes:  Rows, black tubing, 3 x 10  High rows, green tubing 3 x 10  Shoulder extension, green tubing, palms forward, 3 x 10  Cat/camel in quadruped x 15   Standing thoracic rotation at wall, 5" hold, x 15 each side   Wall deb, blue half foam along spine at wall, 2x15   Standing horizontal abduction,  1/2 roll on wall against back, green band, 2-3" hold, 3x10  LTR, LEs on green ball x 20   B knee to chest, LE s on green ball, 3-5" hold x 20   Seated thoracic ext, half foam in chair, 10"x 10     Neuromuscular re-education x 17 minutes to improve muscle activation and control:  Supine horizontal abduction, red band 2 x 10  Wall walk bilaterally with lift off 2 x 15  Standing Pallof press, green tubing, 3x10 each way  Diagonal pull from low point to high point with +7# cable cross, knees slightly bent, band moving from knee " towards opposite shoulder, 2 x 10 each way  Diagonal pull from high point to low with 7# cable cross, knees slightly bent, band moving from shoulder to opposite knee, 2 x 10 each way  Shoulder flexion lifting green physioball with 1/2 roll behind back on wall x 20    Functional therapeutic activities to improve functional performance for 08 minutes:  UBE, forward 3'/retro 3', 3.5 resistance  Lifting 12.5# carry box to simulate lifting basket of vegetable harvest. Cues for proper body mechanics  Education re: safety as pt stated she was considering purchasing some form of heat for application to her low back. Instruction to not apply heat at home 2* sensory loss with SCI and inability to monitor safely, and to only have applied in clinic with therapist supervision.    Review of home exercise program with addition of thoracic extension over back of chair when she finds a suitable chair.      Manual therapy techniques: Joint mobilizations were applied to the: thoracic for 00 minutes:  STM bilateral thoracic paraspinals  L thoracic rib inhalation mobilization in sitting  Prone thoracic PAIVMs, Gr II/III- JM    Patient Education and Home Exercises       Education provided:   - Educated pt that he/she may feel soreness after session.    - Notify therapist of forewarning of AD symptoms    Home Exercises Provided: Instructed patient to continue current home exercise program.  Exercises were reviewed and Vesta was able to demonstrate them prior to the end of the session.  Vesta demonstrated good  understanding of the education provided. See Electronic Medical Record under Patient Instructions for exercises provided during therapy sessions    Assessment     Progressing with improved pain with gardening. Worked on floor-waist transfers with carry box to simulate lifting basket of harvest. Cues for proper body mechanics with modifications 2* limited tibial forward translation with AFOs. No pain provocation. She will benefit from  continued physical therapy intervention to progress strength, ROM, and activity tolerance, and decreased pain, toward goals set forth in plan of care to improve functional mobility and quality of life.     Vesta is making good progress towards meeting set goals.    Patient prognosis is Fair.   Rehab potential: fair     Patient will continue to benefit from skilled outpatient physical therapy to address the deficits listed in the problem list box on initial evaluation, provide pt/family education and to maximize pt's level of independence in the home and community environment.      Anticipated barriers to physical therapy: co-morbidities     Goals:   Short Term Goals: 4 weeks progressing, not met, 5/10/2024  1.Report decreased in pain at worse less than  <   / =  5/10  to increase tolerance for functional activities   2. Pt to improve Back range of motion by 25% to allow for improved functional mobility to allow for improvement in IADLs.   3. Increased  BUE MMT 1/2  grade to increase tolerance for ADL and work activities.  4. Pt to report standing tolerance improved to 1-2 hours prior to requiring rest break.  5. Pt to tolerate HEP to improve ROM and independence with ADL's  6. Increased MMT for lower traps/middle traps/rhomboids to > or = 4-/5 to increase tolerance for ADL and improve posture.     Long Term Goals: 8 weeks progressing, not met, 5/10/2024  1.Report decreased in pain at worse less than  <   / =  2 /10  to increase tolerance for functional activities   2.Pt to improve range of motion by 50% to allow for improved functional mobility to allow for improvement in IADLs.   3.Increased BUE MMT 1 grade  to increase tolerance for ADL and work activities.  4.  Pt will report return to appropriate workout routine to maintain functional abilities.  5. Increased MMT  for lower traps/middle traps/rhomboids to > or = 4/5 to increase tolerance for ADL and improve posture.    Plan     Continue per POC, progressing as  appropriate to achieve stated goals.    Continue with: Outpatient Physical Therapy 2 times weekly for 3-4 weeks to include the following interventions: Manual Therapy, Moist Heat/ Ice, Neuromuscular Re-ed, Patient Education, Therapeutic Activities, and Therapeutic Exercise.     Physical therapist and physical therapy assistant(s) met face to face to discuss patient's treatment plan and progress towards established goals. Pt will be seen by a physical therapist minimally every 6th visit or every 30 days.    Alfreda Yoder, PTA

## 2024-05-24 ENCOUNTER — CLINICAL SUPPORT (OUTPATIENT)
Dept: REHABILITATION | Facility: HOSPITAL | Age: 64
End: 2024-05-24
Payer: COMMERCIAL

## 2024-05-24 DIAGNOSIS — M53.84 DECREASED ROM OF THORACIC SPINE: Primary | ICD-10-CM

## 2024-05-24 DIAGNOSIS — M54.6 ACUTE LEFT-SIDED THORACIC BACK PAIN: ICD-10-CM

## 2024-05-24 DIAGNOSIS — Z74.09 IMPAIRED FUNCTIONAL MOBILITY AND ACTIVITY TOLERANCE: ICD-10-CM

## 2024-05-24 PROCEDURE — 97112 NEUROMUSCULAR REEDUCATION: CPT | Mod: PN,CQ

## 2024-05-24 PROCEDURE — 97110 THERAPEUTIC EXERCISES: CPT | Mod: PN,CQ

## 2024-05-24 PROCEDURE — 97530 THERAPEUTIC ACTIVITIES: CPT | Mod: PN,CQ

## 2024-05-28 NOTE — PROGRESS NOTES
"  [facility]   Physical Therapy Treatment Note      Name: Vesta Tucker  Clinic Number: [id number]    Therapy Diagnosis:   Encounter Diagnoses   Name Primary?    Decreased ROM of thoracic spine Yes    Acute left-sided thoracic back pain     Impaired functional mobility and activity tolerance        Physician: Rachael Laws PA-C    Visit Date: 5/29/2024    Physician Orders: PT Eval and Treat   Medical Diagnosis from Referral:   M54.6 (ICD-10-CM) - Acute left-sided thoracic back pain   M54.14 (ICD-10-CM) - Thoracic radiculopathy      Evaluation Date: 3/13/2024  Authorization Period Expiration: 12/21/2024  Plan of Care Expiration: 7/10/2024  Visit # / Visits authorized: 19/20  PHYSICAL THERAPY POC: 7 visits after 5/10/2024 (4/7)  FOTO d/c last score 54 on 4/24/2024    Time In: 0732  Time Out: 0828   Total Time: 56 minutes  Total Billable Time: 54 minutes     Precautions: Standard and dons AFOs bilaterally, 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 Gets forewarning of upward flow of blood feeling from thoracic spine and face flush sensation, pressure in her ears, and is able to sense forewarning of AD.    Subjective   HOME EXERCISE PROGRAM: reviewed, reports compliance Once daily.   Response to previous treatment: feeling better   Function: typical morning ache about 3/10 in the morning. Feels like "stiffness after a workout". And then pain during the day is nonexistent. no pain with elliptical or gardening. Posture and weight shifting is improving with gardening. Will soon have to lifts bags of fertilizer sometimes in excess of 50# when wet (bear hug lift). Had pain with prolonged standing at [employer] approx 4/10. Next weekend has procession walk approx 2-3 miles. Fear of hurting as bad as she did last year.     Pain 0/10  Location: bilateral thoracic spine    Objective      Not performed this " "date:  Observation: donning bilateral AFOs     Posture Alignment: increased lordosis; elevation of right shoulder     Sensation: Light touch: Impaired: anterior lower leg bilaterally     GAIT DEVIATIONS: Vesta ambulates with bilateral AFO and displays wide base of support decreased knee flexion in swing phase bilaterally     Cervical ROM: WFL for flexion, extension, B rotation  AROM 75% for bilateral sidebend with stretching to UT    5/10/2024:  Lumbar Range of Motion:     AROM  Pain   Flexion 75% stretch      Extension 25 degrees Yes and difficulty transitioning from flexion to neutral position without UE support      Right Side Bending 15 degrees Y to L side      Left Side Bending 17 degrees  Y with radicular symptoms from L thoracic to chest      Right Rotation 25% Y to L   Left Rotation 25% Y, worse than R rotation with pain to L side      Shoulder Range of Motion:   Shoulder Left Right   Flexion 159 165   Abduction 125* 165   ER Occiput  C7   IR T12 T10      Upper Extremity Strength  (R) UE   (L) UE     Shoulder elevation: 5/5 Shoulder elevation: 5/5   Shoulder flexion: 4-/5 Shoulder flexion: 4/5   Shoulder Abduction: 4-/5 Shoulder abduction: 4-/5   Shoulder ER 4/5 Shoulder ER 4/5   Shoulder IR 4+/5 Shoulder IR 4+/5   Lower Trap 3/5 Lower Trap 3-/5*   Middle Trap 3+/5 Middle Trap 3-/5   Rhomboids 3+/5 Rhomboids 3+/5*         Joint Mobility: hypomobility to mid thoracic spine with PA    Treatment     Bold = exercises performed  + = progressions    Therapeutic exercises to develop ROM, flexibility, and posture for 08 minutes:  Rows, black tubing, 3 x 10  High rows, green tubing 3 x 10  Shoulder extension, green tubing, palms forward, 3 x 10  Cat/camel in quadruped x 15   Standing thoracic rotation at wall, 5" hold, x 15 each side   Wall deb, blue half foam along spine at wall, 2x15   Standing horizontal abduction,  1/2 roll on wall against back, green band, 2-3" hold, 3x10  LTR, LEs on green ball x 20   B knee " "to chest, LE s on green ball, 3-5" hold x 20   Seated thoracic ext, half foam in chair, 10"x 10     Neuromuscular re-education x 17 minutes to improve muscle activation and control:  Supine horizontal abduction, red band 2 x 10  Wall walk bilaterally with lift off 2 x 15  Standing Pallof press, green tubing, 3x10 each way  Diagonal pull from low point to high point with +7# cable cross, knees slightly bent, band moving from knee towards opposite shoulder, 2 x 10 each way  Diagonal pull from high point to low with 7# cable cross, knees slightly bent, band moving from shoulder to opposite knee, 2 x 10 each way  Shoulder flexion lifting green physioball with 1/2 roll behind back on wall x 20    Functional therapeutic activities to improve functional performance for 29 minutes:  UBE, forward 3'/retro 3', 3.5 resistance  Lifting 12.5# and carry box to door and back to simulate lifting and carrying basket of vegetable harvest. Cues for proper body mechanics  Lifting BOSU flat side up from 12" box x 5 to simulate lifting bags of fertilizer.   Lifting BOSU from 12" box, turn, and place into chairs to simulate wheelbarrow SBA for balance with turn and placement  Education re: safety as pt stated she was considering purchasing some form of heat for application to her low back. Instruction to not apply heat at home 2* sensory loss with SCI and inability to monitor safely, and to only have applied in clinic with therapist supervision.    Review of home exercise program with addition of thoracic extension over back of chair when she finds a suitable chair.      Manual therapy techniques: Joint mobilizations were applied to the: thoracic for 00 minutes:  STM bilateral thoracic paraspinals  L thoracic rib inhalation mobilization in sitting  Prone thoracic PAIVMs, Gr II/III- KATELYN    Patient Education and Home Exercises       Education provided:   - Educated pt that he/she may feel soreness after session.    - Notify therapist of " forewarning of AD symptoms    Home Exercises Provided: Instructed patient to continue current home exercise program.  Exercises were reviewed and Vesta was able to demonstrate them prior to the end of the session.  Vesta demonstrated good  understanding of the education provided. See Electronic Medical Record under Patient Instructions for exercises provided during therapy sessions    Assessment     Improving posture and weight shifting with functional mobility training, no longer requiring loosened AFO's for exercises. Progressing with improved pain with gardening. Worked on floor-waist transfers and carry with weighted box to simulate lifting and carrying basket of harvest. Initiated lifting to simulate bags of fertilizer. Cues for proper body mechanics with modifications 2* limited tibial forward translation with AFOs. No pain provocation. She will benefit from continued physical therapy intervention to progress strength, ROM, and activity tolerance, and decreased pain, toward goals set forth in plan of care to improve functional mobility and quality of life.     Vesta is making good progress towards meeting set goals.    Patient prognosis is Fair.   Rehab potential: fair     Patient will continue to benefit from skilled outpatient physical therapy to address the deficits listed in the problem list box on initial evaluation, provide pt/family education and to maximize pt's level of independence in the home and community environment.      Anticipated barriers to physical therapy: co-morbidities     Goals:   Short Term Goals: 4 weeks progressing, not met, 5/10/2024  1.Report decreased in pain at worse less than  <   / =  5/10  to increase tolerance for functional activities   2. Pt to improve Back range of motion by 25% to allow for improved functional mobility to allow for improvement in IADLs.   3. Increased  BUE MMT 1/2  grade to increase tolerance for ADL and work activities.  4. Pt to report standing tolerance  improved to 1-2 hours prior to requiring rest break.  5. Pt to tolerate HEP to improve ROM and independence with ADL's  6. Increased MMT for lower traps/middle traps/rhomboids to > or = 4-/5 to increase tolerance for ADL and improve posture.     Long Term Goals: 8 weeks progressing, not met, 5/10/2024  1.Report decreased in pain at worse less than  <   / =  2 /10  to increase tolerance for functional activities   2.Pt to improve range of motion by 50% to allow for improved functional mobility to allow for improvement in IADLs.   3.Increased BUE MMT 1 grade  to increase tolerance for ADL and work activities.  4.  Pt will report return to appropriate workout routine to maintain functional abilities.  5. Increased MMT  for lower traps/middle traps/rhomboids to > or = 4/5 to increase tolerance for ADL and improve posture.    Plan     Continue per POC, progressing as appropriate to achieve stated goals.    Continue with: Outpatient Physical Therapy 2 times weekly for 3-4 weeks to include the following interventions: Manual Therapy, Moist Heat/ Ice, Neuromuscular Re-ed, Patient Education, Therapeutic Activities, and Therapeutic Exercise.     Physical therapist and physical therapy assistant(s) met face to face to discuss patient's treatment plan and progress towards established goals. Pt will be seen by a physical therapist minimally every 6th visit or every 30 days.    Alfreda Yoder PTA

## 2024-05-29 ENCOUNTER — CLINICAL SUPPORT (OUTPATIENT)
Dept: REHABILITATION | Facility: HOSPITAL | Age: 64
End: 2024-05-29
Payer: COMMERCIAL

## 2024-05-29 DIAGNOSIS — M53.84 DECREASED ROM OF THORACIC SPINE: Primary | ICD-10-CM

## 2024-05-29 DIAGNOSIS — M54.6 ACUTE LEFT-SIDED THORACIC BACK PAIN: ICD-10-CM

## 2024-05-29 DIAGNOSIS — Z74.09 IMPAIRED FUNCTIONAL MOBILITY AND ACTIVITY TOLERANCE: ICD-10-CM

## 2024-05-29 PROCEDURE — 97112 NEUROMUSCULAR REEDUCATION: CPT | Mod: PN,CQ

## 2024-05-29 PROCEDURE — 97530 THERAPEUTIC ACTIVITIES: CPT | Mod: PN,CQ

## 2024-05-29 PROCEDURE — 97110 THERAPEUTIC EXERCISES: CPT | Mod: PN,CQ

## 2024-05-30 DIAGNOSIS — K51.00 ULCERATIVE PANCOLITIS WITHOUT COMPLICATION: ICD-10-CM

## 2024-05-30 RX ORDER — MESALAMINE 0.38 G/1
1.5 CAPSULE, EXTENDED RELEASE ORAL DAILY
Qty: 120 CAPSULE | Refills: 1 | Status: SHIPPED | OUTPATIENT
Start: 2024-05-30 | End: 2024-06-06 | Stop reason: SDUPTHER

## 2024-05-30 NOTE — TELEPHONE ENCOUNTER
No care due was identified.  [facility] Embedded Care Due Messages. Reference number: [id number].   5/30/2024 10:56:08 AM CDT
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
## 2024-05-30 NOTE — TELEPHONE ENCOUNTER
Please approve for MESALAMINE ER 0.375 GM CP24 0.375 Capsule     Last OV 02/28/24  Last refill date 04/15/24  Last labs 06/21/23    Next appt 06/06/24

## 2024-05-30 NOTE — TELEPHONE ENCOUNTER
Refill Routing Note   Medication(s) are not appropriate for processing by [facility] for the following reason(s):        Outside of protocol    [facility] action(s):  Route               Appointments  past 12m or future 3m with PCP    Date Provider   Last Visit   9/27/2022 Shaina Rasmussen MD   Next Visit   6/6/2024 Shaina Rasmussen MD   ED visits in past 90 days: 0        Note composed:11:50 AM 05/30/2024

## 2024-05-31 ENCOUNTER — CLINICAL SUPPORT (OUTPATIENT)
Dept: REHABILITATION | Facility: HOSPITAL | Age: 64
End: 2024-05-31
Payer: COMMERCIAL

## 2024-05-31 DIAGNOSIS — M54.6 ACUTE LEFT-SIDED THORACIC BACK PAIN: ICD-10-CM

## 2024-05-31 DIAGNOSIS — M53.84 DECREASED ROM OF THORACIC SPINE: Primary | ICD-10-CM

## 2024-05-31 DIAGNOSIS — Z74.09 IMPAIRED FUNCTIONAL MOBILITY AND ACTIVITY TOLERANCE: ICD-10-CM

## 2024-05-31 PROCEDURE — 97530 THERAPEUTIC ACTIVITIES: CPT | Mod: PN,CQ

## 2024-05-31 PROCEDURE — 97112 NEUROMUSCULAR REEDUCATION: CPT | Mod: PN,CQ

## 2024-05-31 PROCEDURE — 97110 THERAPEUTIC EXERCISES: CPT | Mod: PN,CQ

## 2024-06-03 ENCOUNTER — OFFICE VISIT (OUTPATIENT)
Dept: UROLOGY | Facility: CLINIC | Age: 64
End: 2024-06-03
Payer: COMMERCIAL

## 2024-06-03 ENCOUNTER — HOSPITAL ENCOUNTER (OUTPATIENT)
Dept: RADIOLOGY | Facility: HOSPITAL | Age: 64
Discharge: HOME | End: 2024-06-03
Attending: STUDENT IN AN ORGANIZED HEALTH CARE EDUCATION/TRAINING PROGRAM
Payer: COMMERCIAL

## 2024-06-03 VITALS — WEIGHT: 156.75 LBS | HEIGHT: 66 IN | BODY MASS INDEX: 25.19 KG/M2

## 2024-06-03 DIAGNOSIS — N20.0 KIDNEY STONES: Primary | ICD-10-CM

## 2024-06-03 DIAGNOSIS — N20.0 KIDNEY STONES: ICD-10-CM

## 2024-06-03 DIAGNOSIS — N31.9 NEUROGENIC BLADDER: ICD-10-CM

## 2024-06-03 PROCEDURE — 74176 CT ABD & PELVIS W/O CONTRAST: CPT | Mod: TC,PO

## 2024-06-03 PROCEDURE — 99999 PR PBB SHADOW E&M-EST. PATIENT-LVL III: CPT | Mod: PBBFAC,,, | Performed by: STUDENT IN AN ORGANIZED HEALTH CARE EDUCATION/TRAINING PROGRAM

## 2024-06-03 PROCEDURE — 74176 CT ABD & PELVIS W/O CONTRAST: CPT | Mod: 26,,, | Performed by: STUDENT IN AN ORGANIZED HEALTH CARE EDUCATION/TRAINING PROGRAM

## 2024-06-03 PROCEDURE — 99214 OFFICE O/P EST MOD 30 MIN: CPT | Mod: S$GLB,,, | Performed by: STUDENT IN AN ORGANIZED HEALTH CARE EDUCATION/TRAINING PROGRAM

## 2024-06-03 PROCEDURE — 1159F MED LIST DOCD IN RCRD: CPT | Mod: CPTII,S$GLB,, | Performed by: STUDENT IN AN ORGANIZED HEALTH CARE EDUCATION/TRAINING PROGRAM

## 2024-06-03 PROCEDURE — 1160F RVW MEDS BY RX/DR IN RCRD: CPT | Mod: CPTII,S$GLB,, | Performed by: STUDENT IN AN ORGANIZED HEALTH CARE EDUCATION/TRAINING PROGRAM

## 2024-06-03 PROCEDURE — 3008F BODY MASS INDEX DOCD: CPT | Mod: CPTII,S$GLB,, | Performed by: STUDENT IN AN ORGANIZED HEALTH CARE EDUCATION/TRAINING PROGRAM

## 2024-06-03 NOTE — PROGRESS NOTES
"[place] - Urology   Clinic Note    Subjective:     Chief Complaint: Nephrolithiasis    History of Present Illness:  Vesta Tucker is a 64 y.o. female who presents to clinic for evaluation and management of kidney stones. She is established to our clinic    She reports a history of stones requiring multiple lithotripsies and ureteroscopies in the past. She   Had imaging in January 20, 2023 showing a distal left ureteral stone and multiple nonobstructing stones.  She was able to pass that stone without intervention.  She reports recent passage of a stone about 1 month ago on the left.  She presents today to discuss treatment of the residual stones.    She is on HCTZ  for stone prevention. Previously did 24 hr urinalysis years ago     She has neurogenic bladder due to a spinal cord injury and performs intermittent cath q4 hours.    Past medical, family, surgical and social history reviewed as documented in chart with pertinent positive medical, family, surgical and social history detailed in HPI.    A review systems was conducted with pertinent positive and negative findings documented in HPI.    Objective:     Estimated body mass index is 25.3 kg/m² as calculated from the following:    Height as of this encounter: 5' 6" (1.676 m).    Weight as of this encounter: 71.1 kg (156 lb 12 oz).    Vital Signs (Most Recent)       Physical Exam  Constitutional:       General: She is not in acute distress.     Appearance: She is well-developed. She is not ill-appearing or toxic-appearing.   Pulmonary:      Effort: Pulmonary effort is normal. No accessory muscle usage or respiratory distress.   Neurological:      Mental Status: She is alert.         Labs reviewed below:  Lab Results   Component Value Date    BUN 13 06/21/2023    CREATININE 0.7 06/21/2023    WBC 6.39 06/21/2023    HGB 13.6 06/21/2023    HCT 42.5 06/21/2023     06/21/2023    AST 17 06/21/2023    ALT 15 06/21/2023    ALKPHOS 72 06/21/2023    ALBUMIN 3.9 " 06/21/2023    HGBA1C 5.7 (H) 06/21/2023     Assessment:     1. Kidney stones    2. Neurogenic bladder      Plan:     We discussed the medical and surgical management of stones and different approaches depending on the stone size and location. We discussed flomax and its indications. We reviewed shockwave lithotripsy, ureteroscopy with laser lithotripsy, or observation. We discussed the risks and benefits to each approach. We discussed expectations and recovery times, and stone free rates. We reviewed the possibility of needing a ureteral stent and its associated risks. We also discussed the possible need for further procedures regardless of approach.   Recommend repeat CT RSS to evaluate stone burden  Plan staged ureteroscopy with left followed by right  We discussed the indication, risks, benefits, expectations and alternatives of the procedure. She was given the opportunity to ask questions and all these questions were all answered to her satisfaction.    Continue CIC q4    Meng Arellano MD

## 2024-06-04 ENCOUNTER — CLINICAL SUPPORT (OUTPATIENT)
Dept: REHABILITATION | Facility: HOSPITAL | Age: 64
End: 2024-06-04
Payer: COMMERCIAL

## 2024-06-04 ENCOUNTER — TELEPHONE (OUTPATIENT)
Dept: UROLOGY | Facility: CLINIC | Age: 64
End: 2024-06-04
Payer: COMMERCIAL

## 2024-06-04 DIAGNOSIS — N20.0 KIDNEY STONES: Primary | ICD-10-CM

## 2024-06-04 DIAGNOSIS — M53.84 DECREASED ROM OF THORACIC SPINE: Primary | ICD-10-CM

## 2024-06-04 DIAGNOSIS — M54.6 ACUTE LEFT-SIDED THORACIC BACK PAIN: ICD-10-CM

## 2024-06-04 DIAGNOSIS — N20.0 KIDNEY STONE: ICD-10-CM

## 2024-06-04 DIAGNOSIS — Z74.09 IMPAIRED FUNCTIONAL MOBILITY AND ACTIVITY TOLERANCE: ICD-10-CM

## 2024-06-04 PROCEDURE — 97112 NEUROMUSCULAR REEDUCATION: CPT | Mod: PN

## 2024-06-04 PROCEDURE — 97110 THERAPEUTIC EXERCISES: CPT | Mod: PN

## 2024-06-04 PROCEDURE — 97530 THERAPEUTIC ACTIVITIES: CPT | Mod: PN

## 2024-06-04 NOTE — PROGRESS NOTES
[facility]   Physical Therapy Treatment Note      Name: Vesta Tucker  Clinic Number: [id number]    Therapy Diagnosis:   Encounter Diagnoses   Name Primary?    Decreased ROM of thoracic spine Yes    Acute left-sided thoracic back pain     Impaired functional mobility and activity tolerance          Physician: Rachael Laws PA-C    Visit Date: 6/4/2024    Physician Orders: PT Eval and Treat   Medical Diagnosis from Referral:   M54.6 (ICD-10-CM) - Acute left-sided thoracic back pain   M54.14 (ICD-10-CM) - Thoracic radiculopathy      Evaluation Date: 3/13/2024  Authorization Period Expiration: 12/21/2024  Plan of Care Expiration: 7/10/2024  Visit # / Visits authorized: 20/20  PHYSICAL THERAPY POC: 7 visits after 5/10/2024 (5/7)  FOTO d/c last score 54 on 4/24/2024    Time In: 1:52 pm   Time Out: 2:40 pm   Total Time: 48 minutes  Total Billable Time: 38 minutes     Precautions: Standard and dons AFOs bilaterally, 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 Gets forewarning of upward flow of blood feeling from thoracic spine and face flush sensation, pressure in her ears, and is able to sense forewarning of AD.    Subjective   HOME EXERCISE PROGRAM: reviewed, reports compliance Once daily.   Response to previous treatment: feeling better   Function: She feels her thoracic mobility has improved. Has some discomfort to flank region occasionally with end range rotation and side bending. She has returned to the gym but does not use weight machines, use of free weights and cardio machines.     Pain 0/10  Location: bilateral thoracic spine    Objective      Not performed this date:  Observation: donning bilateral AFOs     Posture Alignment: increased lordosis; elevation of right shoulder     Sensation: Light touch: Impaired: anterior lower leg bilaterally     GAIT DEVIATIONS: Vesta ambulates with bilateral  "AFO and displays wide base of support decreased knee flexion in swing phase bilaterally     Cervical ROM: WFL for flexion, extension, B rotation  AROM 75% for bilateral sidebend     5/10/2024:  Lumbar Range of Motion:     AROM  Pain   Flexion WNL No      Extension 50% No      Right Side Bending 75% Y to L side      Left Side Bending 75%  Y with radicular symptoms from L thoracic to chest      Right Rotation 50% Y to R   Left Rotation 50% Y, to L      Shoulder Range of Motion:   Shoulder Left Right   Flexion 162 165   Abduction 165 165   ER T3 T2   IR T10 T10      Upper Extremity Strength  (R) UE   (L) UE     Shoulder elevation: 5/5 Shoulder elevation: 5/5   Shoulder flexion: 4-/5 Shoulder flexion: 4+/5   Shoulder Abduction: 4-/5 Shoulder abduction: 4/5   Shoulder ER 4/5 Shoulder ER 4/5   Shoulder IR 4+/5 Shoulder IR 4+/5   Lower Trap 3/5 Lower Trap 3+/5   Middle Trap 3+/5 Middle Trap 3+/5   Rhomboids 3+/5 Rhomboids 3+/5         Joint Mobility: hypomobility to mid thoracic spine with PA    Treatment     Bold = exercises performed  + = progressions    Therapeutic exercises to develop ROM, flexibility, and posture for 18 minutes:  Rows, black tubing, 3 x 10  High rows, green tubing 3 x 10  Shoulder extension, green tubing, palms forward, 3 x 10  Cat/camel in quadruped x 15   Standing thoracic rotation at wall, 5" hold, x 15 each side   Wall deb, blue half foam along spine at wall, 2x15 -no foam  Standing horizontal abduction,  1/2 roll on wall against back, green band, 2-3" hold, 3x10  +Standing thoracic rotation thread the needle x 10 each-gentle range  LTR, LEs on green ball x 20   B knee to chest, LE s on green ball, 3-5" hold x 20   Seated thoracic ext, half foam in chair, 10"x 10     Neuromuscular re-education x 15 minutes to improve muscle activation and control:  Supine horizontal abduction, red band 2 x 10  Wall walk bilaterally with lift off 2 x 15  Standing Pallof press, green tubing, 3x10 each " "way  Diagonal pull from low point to high point with 7# cable cross, knees slightly bent, band moving from knee towards opposite shoulder, 2 x 10 each way  Diagonal pull from high point to low with +10# cable cross, knees slightly bent, band moving from shoulder to opposite knee, 2 x 10 each way  Shoulder flexion lifting green physioball with 1/2 roll behind back on wall x 20    Functional therapeutic activities to improve functional performance for 15 minutes:  UBE, forward 3'/retro 3', 3.5 resistance  Lifting 12.5# and carry box to door and back to simulate lifting and carrying basket of vegetable harvest. Cues for proper body mechanics  Suitcase carry bilateral 15# weights x 5 laps  Lifting BOSU flat side up from 12" box x 5 to simulate lifting bags of fertilizer.   Lifting BOSU from 12" box, turn, and place into chairs to simulate wheelbarrow SBA for balance with turn and placement  Education re: safety as pt stated she was considering purchasing some form of heat for application to her low back. Instruction to not apply heat at home 2* sensory loss with SCI and inability to monitor safely, and to only have applied in clinic with therapist supervision.    Review of home exercise program with addition of thoracic extension over back of chair when she finds a suitable chair.      Manual therapy techniques: Joint mobilizations were applied to the: thoracic for 00 minutes:  STM bilateral thoracic paraspinals  L thoracic rib inhalation mobilization in sitting  Prone thoracic PAIVMs, Gr II/III- JM    Patient Education and Home Exercises       Education provided:   - Educated pt that he/she may feel soreness after session.    - Notify therapist of forewarning of AD symptoms    Home Exercises Provided: Instructed patient to continue current home exercise program.  Exercises were reviewed and Vesta was able to demonstrate them prior to the end of the session.  Vesta demonstrated good  understanding of the education " provided. See Electronic Medical Record under Patient Instructions for exercises provided during therapy sessions    Assessment     Vesta arrived with no significant pain to be reported. She demonstrates improved active range of motion with some discomfort to flank with performance of trunk rotation and side bend. Able to return to gym with use of cardio machines and free weights without return of symptoms. She is able to tolerate gardening and working at [employer] which includes frequent lift and carry of weighted boxes and 25# soil bags. She is discharged from physical therapy to continue with maintenance training independently at gym.    Vesta is making good progress towards meeting set goals.    Patient prognosis is Fair.   Rehab potential: fair        Anticipated barriers to physical therapy: co-morbidities     Goals:   Short Term Goals: 4 weeks   1.Report decreased in pain at worse less than  <   / =  5/10  to increase tolerance for functional activities. Met  2. Pt to improve Back range of motion by 25% to allow for improved functional mobility to allow for improvement in IADLs. Met  3. Increased  BUE MMT 1/2  grade to increase tolerance for ADL and work activities.Met  4. Pt to report standing tolerance improved to 1-2 hours prior to requiring rest break.   5. Pt to tolerate HEP to improve ROM and independence with ADL's. Met  6. Increased MMT for lower traps/middle traps/rhomboids to > or = 4-/5 to increase tolerance for ADL and improve posture.Not Met     Long Term Goals: 8 weeks   1.Report decreased in pain at worse less than  <   / =  2 /10  to increase tolerance for functional activities. Not Met  2.Pt to improve range of motion by 50% to allow for improved functional mobility to allow for improvement in IADLs. Partially Met  3.Increased BUE MMT 1 grade  to increase tolerance for ADL and work activities.Partially Met  4.  Pt will report return to appropriate workout routine to maintain functional  abilities. Met  5. Increased MMT  for lower traps/middle traps/rhomboids to > or = 4/5 to increase tolerance for ADL and improve posture.Not Met    Plan     Discharged from PT.    Shaina Griffin, PT, DPT

## 2024-06-06 ENCOUNTER — OFFICE VISIT (OUTPATIENT)
Dept: FAMILY MEDICINE | Facility: CLINIC | Age: 64
End: 2024-06-06
Payer: COMMERCIAL

## 2024-06-06 VITALS
WEIGHT: 159.75 LBS | SYSTOLIC BLOOD PRESSURE: 120 MMHG | HEART RATE: 78 BPM | BODY MASS INDEX: 25.67 KG/M2 | HEIGHT: 66 IN | DIASTOLIC BLOOD PRESSURE: 60 MMHG | OXYGEN SATURATION: 97 %

## 2024-06-06 DIAGNOSIS — K51.00 ULCERATIVE PANCOLITIS WITHOUT COMPLICATION: ICD-10-CM

## 2024-06-06 DIAGNOSIS — Z00.00 ROUTINE GENERAL MEDICAL EXAMINATION AT A HEALTH CARE FACILITY: Primary | ICD-10-CM

## 2024-06-06 DIAGNOSIS — Z12.31 SCREENING MAMMOGRAM FOR HIGH-RISK PATIENT: ICD-10-CM

## 2024-06-06 DIAGNOSIS — R09.81 NASAL SINUS CONGESTION: ICD-10-CM

## 2024-06-06 DIAGNOSIS — Z00.00 ROUTINE GENERAL MEDICAL EXAMINATION AT A HEALTH CARE FACILITY: ICD-10-CM

## 2024-06-06 DIAGNOSIS — F98.8 ATTENTION DEFICIT DISORDER, UNSPECIFIED HYPERACTIVITY PRESENCE: ICD-10-CM

## 2024-06-06 DIAGNOSIS — N20.0 RECURRENT NEPHROLITHIASIS: ICD-10-CM

## 2024-06-06 PROBLEM — Z87.442 HISTORY OF NEPHROLITHIASIS: Status: RESOLVED | Noted: 2017-05-22 | Resolved: 2024-06-06

## 2024-06-06 PROCEDURE — 1160F RVW MEDS BY RX/DR IN RCRD: CPT | Mod: CPTII,S$GLB,, | Performed by: FAMILY MEDICINE

## 2024-06-06 PROCEDURE — 3044F HG A1C LEVEL LT 7.0%: CPT | Mod: CPTII,S$GLB,, | Performed by: FAMILY MEDICINE

## 2024-06-06 PROCEDURE — 3074F SYST BP LT 130 MM HG: CPT | Mod: CPTII,S$GLB,, | Performed by: FAMILY MEDICINE

## 2024-06-06 PROCEDURE — 1159F MED LIST DOCD IN RCRD: CPT | Mod: CPTII,S$GLB,, | Performed by: FAMILY MEDICINE

## 2024-06-06 PROCEDURE — 3078F DIAST BP <80 MM HG: CPT | Mod: CPTII,S$GLB,, | Performed by: FAMILY MEDICINE

## 2024-06-06 PROCEDURE — 99999 PR PBB SHADOW E&M-EST. PATIENT-LVL IV: CPT | Mod: PBBFAC,,, | Performed by: FAMILY MEDICINE

## 2024-06-06 PROCEDURE — 3008F BODY MASS INDEX DOCD: CPT | Mod: CPTII,S$GLB,, | Performed by: FAMILY MEDICINE

## 2024-06-06 PROCEDURE — 99396 PREV VISIT EST AGE 40-64: CPT | Mod: S$GLB,,, | Performed by: FAMILY MEDICINE

## 2024-06-06 RX ORDER — DEXTROAMPHETAMINE SACCHARATE, AMPHETAMINE ASPARTATE, DEXTROAMPHETAMINE SULFATE AND AMPHETAMINE SULFATE 2.5; 2.5; 2.5; 2.5 MG/1; MG/1; MG/1; MG/1
10 TABLET ORAL 2 TIMES DAILY
Qty: 60 TABLET | Refills: 0 | Status: SHIPPED | OUTPATIENT
Start: 2024-08-06

## 2024-06-06 RX ORDER — CYCLOBENZAPRINE HCL 10 MG
10 TABLET ORAL 2 TIMES DAILY PRN
Qty: 90 TABLET | Refills: 1 | Status: SHIPPED | OUTPATIENT
Start: 2024-06-06

## 2024-06-06 RX ORDER — DEXTROAMPHETAMINE SACCHARATE, AMPHETAMINE ASPARTATE, DEXTROAMPHETAMINE SULFATE AND AMPHETAMINE SULFATE 2.5; 2.5; 2.5; 2.5 MG/1; MG/1; MG/1; MG/1
10 TABLET ORAL 2 TIMES DAILY
Qty: 60 TABLET | Refills: 0 | Status: SHIPPED | OUTPATIENT
Start: 2024-06-06

## 2024-06-06 RX ORDER — ALBUTEROL SULFATE 90 UG/1
1-2 AEROSOL, METERED RESPIRATORY (INHALATION) EVERY 6 HOURS PRN
Qty: 18 G | Refills: 3 | Status: SHIPPED | OUTPATIENT
Start: 2024-06-06 | End: 2024-06-18

## 2024-06-06 RX ORDER — DEXTROAMPHETAMINE SACCHARATE, AMPHETAMINE ASPARTATE, DEXTROAMPHETAMINE SULFATE AND AMPHETAMINE SULFATE 2.5; 2.5; 2.5; 2.5 MG/1; MG/1; MG/1; MG/1
10 TABLET ORAL 2 TIMES DAILY
Qty: 60 TABLET | Refills: 0 | Status: SHIPPED | OUTPATIENT
Start: 2024-07-06

## 2024-06-06 RX ORDER — MESALAMINE 0.38 G/1
1.5 CAPSULE, EXTENDED RELEASE ORAL DAILY
Qty: 360 CAPSULE | Refills: 3 | Status: SHIPPED | OUTPATIENT
Start: 2024-06-06

## 2024-06-06 RX ORDER — FLUTICASONE PROPIONATE 50 MCG
1 SPRAY, SUSPENSION (ML) NASAL DAILY
Qty: 18.2 ML | Refills: 0 | Status: SHIPPED | OUTPATIENT
Start: 2024-06-06

## 2024-06-06 RX ORDER — HYDROCHLOROTHIAZIDE 12.5 MG/1
12.5 CAPSULE ORAL DAILY
Qty: 90 CAPSULE | Refills: 3 | Status: SHIPPED | OUTPATIENT
Start: 2024-06-06
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
## 2024-06-07 NOTE — PLAN OF CARE
Outpatient Therapy Discharge Summary     Name: Vesta Tucker  Clinic Number: [id number]    Therapy Diagnosis:   Encounter Diagnoses   Name Primary?    Decreased ROM of thoracic spine Yes    Acute left-sided thoracic back pain     Impaired functional mobility and activity tolerance      Physician: Rachael Laws PA-C    Physician Orders: PT Eval and Treat   Medical Diagnosis from Referral:   M54.6 (ICD-10-CM) - Acute left-sided thoracic back pain   M54.14 (ICD-10-CM) - Thoracic radiculopathy      Evaluation Date: 3/13/2024  Authorization Period Expiration: 12/21/2024  Plan of Care Expiration: 7/10/2024    Date of Last visit: 6/4/2024  Total Visits Received: 25  Cancelled Visits: 1  No Show Visits: 0    Assessment    Vesta arrived with no significant pain to be reported. She demonstrates improved active range of motion with some discomfort to flank with performance of trunk rotation and side bend. Able to return to gym with use of cardio machines and free weights without return of symptoms. She is able to tolerate gardening and working at [employer] which includes frequent lift and carry of weighted boxes and 25# soil bags. She is discharged from physical therapy to continue with maintenance training independently at gym.    Goals: Short Term Goals: 4 weeks   1.Report decreased in pain at worse less than  <   / =  5/10  to increase tolerance for functional activities. Met  2. Pt to improve Back range of motion by 25% to allow for improved functional mobility to allow for improvement in IADLs. Met  3. Increased  BUE MMT 1/2  grade to increase tolerance for ADL and work activities.Met  4. Pt to report standing tolerance improved to 1-2 hours prior to requiring rest break.   5. Pt to tolerate HEP to improve ROM and independence with ADL's. Met  6. Increased MMT for lower traps/middle traps/rhomboids to > or = 4-/5 to increase tolerance for ADL and improve posture.Not Met     Long Term Goals: 8 weeks   1.Report  decreased in pain at worse less than  <   / =  2 /10  to increase tolerance for functional activities. Not Met  2.Pt to improve range of motion by 50% to allow for improved functional mobility to allow for improvement in IADLs. Partially Met  3.Increased BUE MMT 1 grade  to increase tolerance for ADL and work activities.Partially Met  4.  Pt will report return to appropriate workout routine to maintain functional abilities. Met  5. Increased MMT  for lower traps/middle traps/rhomboids to > or = 4/5 to increase tolerance for ADL and improve posture.Not Met    Discharge reason: Patient has reached the maximum rehab potential for the present time    Plan   This patient is discharged from Physical Therapy

## 2024-06-11 ENCOUNTER — LAB VISIT (OUTPATIENT)
Dept: LAB | Facility: HOSPITAL | Age: 64
End: 2024-06-11
Attending: FAMILY MEDICINE
Payer: COMMERCIAL

## 2024-06-11 DIAGNOSIS — Z00.00 ROUTINE GENERAL MEDICAL EXAMINATION AT A HEALTH CARE FACILITY: ICD-10-CM

## 2024-06-11 LAB
25(OH)D3+25(OH)D2 SERPL-MCNC: 30 NG/ML (ref 30–96)
ALBUMIN SERPL BCP-MCNC: 3.9 G/DL (ref 3.5–5.2)
ALP SERPL-CCNC: 66 U/L (ref 55–135)
ALT SERPL W/O P-5'-P-CCNC: 18 U/L (ref 10–44)
ANION GAP SERPL CALC-SCNC: 8 MMOL/L (ref 8–16)
AST SERPL-CCNC: 19 U/L (ref 10–40)
BILIRUB SERPL-MCNC: 0.6 MG/DL (ref 0.1–1)
BUN SERPL-MCNC: 13 MG/DL (ref 8–23)
CALCIUM SERPL-MCNC: 9.2 MG/DL (ref 8.7–10.5)
CHLORIDE SERPL-SCNC: 106 MMOL/L (ref 95–110)
CHOLEST SERPL-MCNC: 194 MG/DL (ref 120–199)
CHOLEST/HDLC SERPL: 4.2 {RATIO} (ref 2–5)
CO2 SERPL-SCNC: 26 MMOL/L (ref 23–29)
CREAT SERPL-MCNC: 0.7 MG/DL (ref 0.5–1.4)
ERYTHROCYTE [DISTWIDTH] IN BLOOD BY AUTOMATED COUNT: 13.9 % (ref 11.5–14.5)
EST. GFR  (NO RACE VARIABLE): >60 ML/MIN/1.73 M^2
ESTIMATED AVG GLUCOSE: 117 MG/DL (ref 68–131)
FOLATE SERPL-MCNC: 8 NG/ML (ref 4–24)
GLUCOSE SERPL-MCNC: 101 MG/DL (ref 70–110)
HBA1C MFR BLD: 5.7 % (ref 4–5.6)
HCT VFR BLD AUTO: 42.6 % (ref 37–48.5)
HDLC SERPL-MCNC: 46 MG/DL (ref 40–75)
HDLC SERPL: 23.7 % (ref 20–50)
HGB BLD-MCNC: 13.9 G/DL (ref 12–16)
IRON SERPL-MCNC: 105 UG/DL (ref 30–160)
LDLC SERPL CALC-MCNC: 123.8 MG/DL (ref 63–159)
MAGNESIUM SERPL-MCNC: 1.9 MG/DL (ref 1.6–2.6)
MCH RBC QN AUTO: 30.3 PG (ref 27–31)
MCHC RBC AUTO-ENTMCNC: 32.6 G/DL (ref 32–36)
MCV RBC AUTO: 93 FL (ref 82–98)
NONHDLC SERPL-MCNC: 148 MG/DL
PLATELET # BLD AUTO: 278 K/UL (ref 150–450)
PMV BLD AUTO: 11 FL (ref 9.2–12.9)
POTASSIUM SERPL-SCNC: 3.9 MMOL/L (ref 3.5–5.1)
PROT SERPL-MCNC: 6.7 G/DL (ref 6–8.4)
RBC # BLD AUTO: 4.59 M/UL (ref 4–5.4)
SODIUM SERPL-SCNC: 140 MMOL/L (ref 136–145)
TRIGL SERPL-MCNC: 121 MG/DL (ref 30–150)
TSH SERPL DL<=0.005 MIU/L-ACNC: 0.82 UIU/ML (ref 0.4–4)
URATE SERPL-MCNC: 4.1 MG/DL (ref 2.4–5.7)
VIT B12 SERPL-MCNC: 277 PG/ML (ref 210–950)
WBC # BLD AUTO: 5.49 K/UL (ref 3.9–12.7)

## 2024-06-11 PROCEDURE — 80053 COMPREHEN METABOLIC PANEL: CPT | Performed by: FAMILY MEDICINE

## 2024-06-11 PROCEDURE — 82306 VITAMIN D 25 HYDROXY: CPT | Performed by: FAMILY MEDICINE

## 2024-06-11 PROCEDURE — 84550 ASSAY OF BLOOD/URIC ACID: CPT | Performed by: FAMILY MEDICINE

## 2024-06-11 PROCEDURE — 83540 ASSAY OF IRON: CPT | Performed by: FAMILY MEDICINE

## 2024-06-11 PROCEDURE — 83036 HEMOGLOBIN GLYCOSYLATED A1C: CPT | Performed by: FAMILY MEDICINE

## 2024-06-11 PROCEDURE — 83735 ASSAY OF MAGNESIUM: CPT | Performed by: FAMILY MEDICINE

## 2024-06-11 PROCEDURE — 85027 COMPLETE CBC AUTOMATED: CPT | Performed by: FAMILY MEDICINE

## 2024-06-11 PROCEDURE — 82746 ASSAY OF FOLIC ACID SERUM: CPT | Performed by: FAMILY MEDICINE

## 2024-06-11 PROCEDURE — 82607 VITAMIN B-12: CPT | Performed by: FAMILY MEDICINE

## 2024-06-11 PROCEDURE — 84425 ASSAY OF VITAMIN B-1: CPT | Performed by: FAMILY MEDICINE

## 2024-06-11 PROCEDURE — 80061 LIPID PANEL: CPT | Performed by: FAMILY MEDICINE

## 2024-06-11 PROCEDURE — 84443 ASSAY THYROID STIM HORMONE: CPT | Performed by: FAMILY MEDICINE

## 2024-06-14 ENCOUNTER — PATIENT MESSAGE (OUTPATIENT)
Dept: UROLOGY | Facility: CLINIC | Age: 64
End: 2024-06-14
Payer: COMMERCIAL

## 2024-06-17 LAB — VIT B1 BLD-MCNC: 42 UG/L (ref 38–122)

## 2024-06-19 ENCOUNTER — PATIENT MESSAGE (OUTPATIENT)
Dept: UROLOGY | Facility: CLINIC | Age: 64
End: 2024-06-19
Payer: COMMERCIAL

## 2024-08-08 ENCOUNTER — OFFICE VISIT (OUTPATIENT)
Dept: FAMILY MEDICINE | Facility: CLINIC | Age: 64
End: 2024-08-08
Payer: COMMERCIAL

## 2024-08-08 VITALS — HEIGHT: 66 IN | BODY MASS INDEX: 25.54 KG/M2 | HEART RATE: 62 BPM | WEIGHT: 158.94 LBS | RESPIRATION RATE: 18 BRPM

## 2024-08-08 DIAGNOSIS — F98.8 ATTENTION DEFICIT DISORDER, UNSPECIFIED HYPERACTIVITY PRESENCE: Primary | ICD-10-CM

## 2024-08-08 DIAGNOSIS — M21.372 FOOT DROP, BILATERAL: ICD-10-CM

## 2024-08-08 DIAGNOSIS — Z79.899 ENCOUNTER FOR MEDICATION MANAGEMENT: ICD-10-CM

## 2024-08-08 DIAGNOSIS — M21.371 FOOT DROP, BILATERAL: ICD-10-CM

## 2024-08-08 PROCEDURE — 1159F MED LIST DOCD IN RCRD: CPT | Mod: CPTII,95,, | Performed by: NURSE PRACTITIONER

## 2024-08-08 PROCEDURE — 3008F BODY MASS INDEX DOCD: CPT | Mod: CPTII,95,, | Performed by: NURSE PRACTITIONER

## 2024-08-08 PROCEDURE — 99214 OFFICE O/P EST MOD 30 MIN: CPT | Mod: 95,,, | Performed by: NURSE PRACTITIONER

## 2024-08-08 PROCEDURE — 1160F RVW MEDS BY RX/DR IN RCRD: CPT | Mod: CPTII,95,, | Performed by: NURSE PRACTITIONER

## 2024-08-08 PROCEDURE — 3044F HG A1C LEVEL LT 7.0%: CPT | Mod: CPTII,95,, | Performed by: NURSE PRACTITIONER

## 2024-08-08 RX ORDER — DEXTROAMPHETAMINE SACCHARATE, AMPHETAMINE ASPARTATE, DEXTROAMPHETAMINE SULFATE AND AMPHETAMINE SULFATE 2.5; 2.5; 2.5; 2.5 MG/1; MG/1; MG/1; MG/1
10 TABLET ORAL 2 TIMES DAILY
Qty: 60 TABLET | Refills: 0 | Status: SHIPPED | OUTPATIENT
Start: 2024-08-08

## 2024-11-08 DIAGNOSIS — Z00.00 ROUTINE GENERAL MEDICAL EXAMINATION AT A HEALTH CARE FACILITY: ICD-10-CM

## 2024-11-08 DIAGNOSIS — F98.8 ATTENTION DEFICIT DISORDER: ICD-10-CM

## 2024-11-08 RX ORDER — CYCLOBENZAPRINE HCL 10 MG
10 TABLET ORAL
Qty: 90 TABLET | Refills: 1 | Status: SHIPPED | OUTPATIENT
Start: 2024-11-08

## 2024-11-08 RX ORDER — DEXTROAMPHETAMINE SACCHARATE, AMPHETAMINE ASPARTATE, DEXTROAMPHETAMINE SULFATE AND AMPHETAMINE SULFATE 2.5; 2.5; 2.5; 2.5 MG/1; MG/1; MG/1; MG/1
1 TABLET ORAL 2 TIMES DAILY
Qty: 60 TABLET | Refills: 0 | Status: SHIPPED | OUTPATIENT
Start: 2024-11-08

## 2024-11-08 NOTE — TELEPHONE ENCOUNTER
No care due was identified.  Health [facility] Embedded Care Due Messages. Reference number: [id number].   11/08/2024 2:30:17 PM CST

## 2025-01-13 RX ORDER — FAMOTIDINE 40 MG/1
TABLET, FILM COATED ORAL
Qty: 180 TABLET | Refills: 1 | Status: SHIPPED | OUTPATIENT
Start: 2025-01-13

## 2025-01-13 NOTE — TELEPHONE ENCOUNTER
No care due was identified.  Health [facility] Embedded Care Due Messages. Reference number: [id number].   1/13/2025 3:18:50 PM CST

## 2025-01-14 NOTE — TELEPHONE ENCOUNTER
Refill Decision Note   Vesta Tucker  is requesting a refill authorization.  Brief Assessment and Rationale for Refill:  Approve     Medication Therapy Plan:         Comments:     Note composed:9:30 PM 01/13/2025

## 2025-03-05 ENCOUNTER — OFFICE VISIT (OUTPATIENT)
Dept: FAMILY MEDICINE | Facility: CLINIC | Age: 65
End: 2025-03-05
Payer: COMMERCIAL

## 2025-03-05 VITALS
OXYGEN SATURATION: 96 % | DIASTOLIC BLOOD PRESSURE: 72 MMHG | SYSTOLIC BLOOD PRESSURE: 110 MMHG | HEIGHT: 66 IN | BODY MASS INDEX: 25.12 KG/M2 | WEIGHT: 156.31 LBS | HEART RATE: 83 BPM

## 2025-03-05 DIAGNOSIS — F90.9 ATTENTION DEFICIT HYPERACTIVITY DISORDER (ADHD), UNSPECIFIED ADHD TYPE: Primary | ICD-10-CM

## 2025-03-05 DIAGNOSIS — Z00.00 ROUTINE GENERAL MEDICAL EXAMINATION AT A HEALTH CARE FACILITY: ICD-10-CM

## 2025-03-05 DIAGNOSIS — K21.9 GASTROESOPHAGEAL REFLUX DISEASE, UNSPECIFIED WHETHER ESOPHAGITIS PRESENT: ICD-10-CM

## 2025-03-05 DIAGNOSIS — R09.81 NASAL SINUS CONGESTION: ICD-10-CM

## 2025-03-05 PROCEDURE — 99999 PR PBB SHADOW E&M-EST. PATIENT-LVL IV: CPT | Mod: PBBFAC,,, | Performed by: NURSE PRACTITIONER

## 2025-03-05 RX ORDER — DEXTROAMPHETAMINE SACCHARATE, AMPHETAMINE ASPARTATE, DEXTROAMPHETAMINE SULFATE AND AMPHETAMINE SULFATE 2.5; 2.5; 2.5; 2.5 MG/1; MG/1; MG/1; MG/1
1 TABLET ORAL 2 TIMES DAILY
Qty: 60 TABLET | Refills: 0 | Status: SHIPPED | OUTPATIENT
Start: 2025-05-04

## 2025-03-05 RX ORDER — DEXTROAMPHETAMINE SACCHARATE, AMPHETAMINE ASPARTATE, DEXTROAMPHETAMINE SULFATE AND AMPHETAMINE SULFATE 2.5; 2.5; 2.5; 2.5 MG/1; MG/1; MG/1; MG/1
10 TABLET ORAL 2 TIMES DAILY
Qty: 60 TABLET | Refills: 0 | Status: SHIPPED | OUTPATIENT
Start: 2025-04-04

## 2025-03-05 RX ORDER — FAMOTIDINE 40 MG/1
TABLET, FILM COATED ORAL
Qty: 180 TABLET | Refills: 1 | Status: SHIPPED | OUTPATIENT
Start: 2025-03-05

## 2025-03-05 RX ORDER — DEXTROAMPHETAMINE SACCHARATE, AMPHETAMINE ASPARTATE, DEXTROAMPHETAMINE SULFATE AND AMPHETAMINE SULFATE 2.5; 2.5; 2.5; 2.5 MG/1; MG/1; MG/1; MG/1
10 TABLET ORAL 2 TIMES DAILY
Qty: 60 TABLET | Refills: 0 | Status: SHIPPED | OUTPATIENT
Start: 2025-03-05
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
## 2025-03-31 DIAGNOSIS — Z00.00 ROUTINE GENERAL MEDICAL EXAMINATION AT A HEALTH CARE FACILITY: ICD-10-CM

## 2025-03-31 RX ORDER — CYCLOBENZAPRINE HCL 10 MG
TABLET ORAL
Qty: 90 TABLET | Refills: 1 | Status: SHIPPED | OUTPATIENT
Start: 2025-03-31

## 2025-03-31 NOTE — TELEPHONE ENCOUNTER
Care Due:                  Date            Visit Type   Department     Provider  --------------------------------------------------------------------------------                                EP -                              PRIMARY      [facility] FAMILY  Last Visit: 06-      CARE ([facility])   MEDICINE       Shaina Rasmussen  Next Visit: None Scheduled  None         None Found                                                            Last  Test          Frequency    Reason                     Performed    Due Date  --------------------------------------------------------------------------------    Office Visit  12 months..  mesalamine...............  06- 06-    CBC.........  12 months..  mesalamine...............  06- 06-    CMP.........  12 months..  hydroCHLOROthiazide,       06- 06-                             mesalamine...............    [facility] Embedded Care Due Messages. Reference number: [id number].   3/31/2025 3:25:41 PM CDT

## 2025-04-14 DIAGNOSIS — K21.9 GASTROESOPHAGEAL REFLUX DISEASE, UNSPECIFIED WHETHER ESOPHAGITIS PRESENT: ICD-10-CM

## 2025-04-14 DIAGNOSIS — N20.0 RECURRENT NEPHROLITHIASIS: ICD-10-CM

## 2025-04-14 RX ORDER — FAMOTIDINE 40 MG/1
TABLET, FILM COATED ORAL
Qty: 180 TABLET | Refills: 0 | Status: SHIPPED | OUTPATIENT
Start: 2025-04-14

## 2025-04-14 RX ORDER — HYDROCHLOROTHIAZIDE 12.5 MG/1
12.5 CAPSULE ORAL
Qty: 90 CAPSULE | Refills: 0 | Status: SHIPPED | OUTPATIENT
Start: 2025-04-14

## 2025-04-14 NOTE — TELEPHONE ENCOUNTER
Refill Decision Note   Vesta Tucker  is requesting a refill authorization.  Brief Assessment and Rationale for Refill:  Approve     Medication Therapy Plan:        Comments:     Note composed:5:32 PM 04/14/2025

## 2025-04-14 NOTE — TELEPHONE ENCOUNTER
No care due was identified.  [facility] Embedded Care Due Messages. Reference number: [id number].   4/14/2025 1:58:11 PM CDT

## 2025-04-25 ENCOUNTER — OFFICE VISIT (OUTPATIENT)
Dept: FAMILY MEDICINE | Facility: CLINIC | Age: 65
End: 2025-04-25
Payer: COMMERCIAL

## 2025-04-25 VITALS
DIASTOLIC BLOOD PRESSURE: 70 MMHG | WEIGHT: 154.31 LBS | SYSTOLIC BLOOD PRESSURE: 110 MMHG | OXYGEN SATURATION: 95 % | BODY MASS INDEX: 24.91 KG/M2 | HEART RATE: 74 BPM

## 2025-04-25 DIAGNOSIS — J45.21 MILD INTERMITTENT ASTHMA WITH ACUTE EXACERBATION: ICD-10-CM

## 2025-04-25 DIAGNOSIS — R09.81 NASAL SINUS CONGESTION: ICD-10-CM

## 2025-04-25 DIAGNOSIS — B96.89 ACUTE BACTERIAL SINUSITIS: Primary | ICD-10-CM

## 2025-04-25 DIAGNOSIS — J01.90 ACUTE BACTERIAL SINUSITIS: Primary | ICD-10-CM

## 2025-04-25 PROCEDURE — 99999 PR PBB SHADOW E&M-EST. PATIENT-LVL V: CPT | Mod: PBBFAC,,, | Performed by: NURSE PRACTITIONER

## 2025-04-25 RX ORDER — PREDNISONE 20 MG/1
20 TABLET ORAL DAILY
Qty: 5 TABLET | Refills: 0 | Status: SHIPPED | OUTPATIENT
Start: 2025-04-25 | End: 2025-04-30

## 2025-04-25 RX ORDER — ALBUTEROL SULFATE 0.83 MG/ML
2.5 SOLUTION RESPIRATORY (INHALATION) EVERY 6 HOURS PRN
Qty: 75 ML | Refills: 0 | Status: SHIPPED | OUTPATIENT
Start: 2025-04-25 | End: 2025-05-25

## 2025-04-25 RX ORDER — DOXYCYCLINE 100 MG/1
100 CAPSULE ORAL 2 TIMES DAILY
Qty: 14 CAPSULE | Refills: 0 | Status: SHIPPED | OUTPATIENT
Start: 2025-04-25 | End: 2025-05-02

## 2025-04-25 NOTE — PATIENT INSTRUCTIONS
OVER THE COUNTER RECOMMENDATIONS/SUGGESTIONS.    Sinus massage    Humidifier/steam shower     Use Nasal Saline to mechanically move any post nasal drip from your eustachian tube or from the back of your throat. (do this prior to using Flonase, to help with effectiveness)    Use Flonase 1spray/nostril twice a day. It is a local acting steroid nasal spray, if you develop a bloody nose, stop using the medication immediately.    Use an antihistamine such as Claritin, Zyrtec or Allegra to dry you out.      Use mucinex (guaifenisin) to break up mucous up to 2400mg/day to loosen any mucous.     Make sure to stay well hydrated.     May try honey- which is a natural cough suppressant      Tylenol up to 4,000 mg a day is safe for short periods and can be used for body aches, pain, and fever. However in high doses and prolonged use it can cause liver irritation.       Please go to ER/urgent care if symptoms persist/worsen, especially if after hours.      Do not hesitate to get in touch with me should you have any further questions.      Thank you for trusting me with your care!  I wish you health and happiness.     SHYANNE Jin
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
## 2025-05-14 DIAGNOSIS — Z00.00 ROUTINE GENERAL MEDICAL EXAMINATION AT A HEALTH CARE FACILITY: ICD-10-CM

## 2025-05-14 RX ORDER — CYCLOBENZAPRINE HCL 10 MG
TABLET ORAL
Qty: 90 TABLET | Refills: 2 | Status: SHIPPED | OUTPATIENT
Start: 2025-05-14

## 2025-06-16 DIAGNOSIS — K51.00 ULCERATIVE PANCOLITIS WITHOUT COMPLICATION: ICD-10-CM

## 2025-06-16 RX ORDER — MESALAMINE 0.38 G/1
1.5 CAPSULE, EXTENDED RELEASE ORAL
Qty: 360 CAPSULE | Refills: 0 | Status: SHIPPED | OUTPATIENT
Start: 2025-06-16

## 2025-06-16 NOTE — TELEPHONE ENCOUNTER
Care Due:                  Date            Visit Type   Department     Provider  --------------------------------------------------------------------------------                                EP -                              PRIMARY      [facility] FAMILY  Last Visit: 06-      CARE ([facility])   MEDICINE       Shaina Rasmussen  Next Visit: None Scheduled  None         None Found                                                            Last  Test          Frequency    Reason                     Performed    Due Date  --------------------------------------------------------------------------------    Office Visit  12 months..  hydroCHLOROthiazide,       06- 06-                             mesalamine...............    CBC.........  12 months..  mesalamine...............  06- 06-    CMP.........  12 months..  hydroCHLOROthiazide,       06- 06-                             mesalamine...............    [employer] Embedded Care Due Messages. Reference number: [id number].   6/16/2025 3:44:46 PM CDT

## 2025-06-16 NOTE — TELEPHONE ENCOUNTER
Refill Routing Note   Medication(s) are not appropriate for processing by [facility] for the following reason(s):        Outside of protocol  Patient not seen by provider within 15 months  Required labs outdated    [facility] action(s):  Route   Requires appointment : Yes     Requires labs : Yes             Appointments  past 12m or future 3m with PCP    Date Provider   Last Visit   6/6/2024 Shaina Rasmussen MD   Next Visit   Visit date not found Shaina Rasmussen MD   ED visits in past 90 days: 0        Note composed:4:15 PM 06/16/2025

## 2025-07-28 ENCOUNTER — OFFICE VISIT (OUTPATIENT)
Dept: FAMILY MEDICINE | Facility: CLINIC | Age: 65
End: 2025-07-28
Payer: COMMERCIAL

## 2025-07-28 VITALS
DIASTOLIC BLOOD PRESSURE: 60 MMHG | OXYGEN SATURATION: 98 % | SYSTOLIC BLOOD PRESSURE: 114 MMHG | WEIGHT: 160.63 LBS | HEIGHT: 66 IN | BODY MASS INDEX: 25.81 KG/M2 | HEART RATE: 78 BPM

## 2025-07-28 DIAGNOSIS — Z00.00 ROUTINE GENERAL MEDICAL EXAMINATION AT A HEALTH CARE FACILITY: Primary | ICD-10-CM

## 2025-07-28 DIAGNOSIS — Z12.11 SPECIAL SCREENING FOR MALIGNANT NEOPLASMS, COLON: ICD-10-CM

## 2025-07-28 DIAGNOSIS — N20.0 RECURRENT NEPHROLITHIASIS: ICD-10-CM

## 2025-07-28 DIAGNOSIS — F90.9 ATTENTION DEFICIT HYPERACTIVITY DISORDER (ADHD), UNSPECIFIED ADHD TYPE: ICD-10-CM

## 2025-07-28 DIAGNOSIS — K21.9 GASTROESOPHAGEAL REFLUX DISEASE, UNSPECIFIED WHETHER ESOPHAGITIS PRESENT: ICD-10-CM

## 2025-07-28 PROBLEM — Z74.09 IMPAIRED FUNCTIONAL MOBILITY AND ACTIVITY TOLERANCE: Status: RESOLVED | Noted: 2024-03-16 | Resolved: 2025-07-28

## 2025-07-28 PROBLEM — M54.6 ACUTE LEFT-SIDED THORACIC BACK PAIN: Status: RESOLVED | Noted: 2024-03-16 | Resolved: 2025-07-28

## 2025-07-28 PROBLEM — G89.21 CHRONIC PAIN DUE TO TRAUMA: Status: RESOLVED | Noted: 2017-05-22 | Resolved: 2025-07-28

## 2025-07-28 PROCEDURE — 99999 PR PBB SHADOW E&M-EST. PATIENT-LVL IV: CPT | Mod: PBBFAC,,, | Performed by: FAMILY MEDICINE

## 2025-07-28 PROCEDURE — 99396 PREV VISIT EST AGE 40-64: CPT | Mod: 25,S$GLB,, | Performed by: FAMILY MEDICINE

## 2025-07-28 PROCEDURE — 1159F MED LIST DOCD IN RCRD: CPT | Mod: CPTII,S$GLB,, | Performed by: FAMILY MEDICINE

## 2025-07-28 PROCEDURE — 3078F DIAST BP <80 MM HG: CPT | Mod: CPTII,S$GLB,, | Performed by: FAMILY MEDICINE

## 2025-07-28 PROCEDURE — 1160F RVW MEDS BY RX/DR IN RCRD: CPT | Mod: CPTII,S$GLB,, | Performed by: FAMILY MEDICINE

## 2025-07-28 PROCEDURE — 3008F BODY MASS INDEX DOCD: CPT | Mod: CPTII,S$GLB,, | Performed by: FAMILY MEDICINE

## 2025-07-28 PROCEDURE — 3074F SYST BP LT 130 MM HG: CPT | Mod: CPTII,S$GLB,, | Performed by: FAMILY MEDICINE

## 2025-07-28 RX ORDER — DEXTROAMPHETAMINE SACCHARATE, AMPHETAMINE ASPARTATE, DEXTROAMPHETAMINE SULFATE AND AMPHETAMINE SULFATE 2.5; 2.5; 2.5; 2.5 MG/1; MG/1; MG/1; MG/1
10 TABLET ORAL 2 TIMES DAILY
Qty: 60 TABLET | Refills: 0 | Status: SHIPPED | OUTPATIENT
Start: 2025-07-28

## 2025-07-28 RX ORDER — FAMOTIDINE 40 MG/1
TABLET, FILM COATED ORAL
Qty: 180 TABLET | Refills: 0 | Status: SHIPPED | OUTPATIENT
Start: 2025-07-28

## 2025-07-28 RX ORDER — DEXTROAMPHETAMINE SACCHARATE, AMPHETAMINE ASPARTATE, DEXTROAMPHETAMINE SULFATE AND AMPHETAMINE SULFATE 2.5; 2.5; 2.5; 2.5 MG/1; MG/1; MG/1; MG/1
1 TABLET ORAL 2 TIMES DAILY
Qty: 60 TABLET | Refills: 0 | Status: SHIPPED | OUTPATIENT
Start: 2025-09-28

## 2025-07-28 RX ORDER — DEXTROAMPHETAMINE SACCHARATE, AMPHETAMINE ASPARTATE, DEXTROAMPHETAMINE SULFATE AND AMPHETAMINE SULFATE 2.5; 2.5; 2.5; 2.5 MG/1; MG/1; MG/1; MG/1
10 TABLET ORAL 2 TIMES DAILY
Qty: 60 TABLET | Refills: 0 | Status: SHIPPED | OUTPATIENT
Start: 2025-08-28

## 2025-07-28 RX ORDER — HYDROCHLOROTHIAZIDE 12.5 MG/1
12.5 CAPSULE ORAL DAILY
Qty: 90 CAPSULE | Refills: 3 | Status: SHIPPED | OUTPATIENT
Start: 2025-07-28
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
## 2025-07-29 ENCOUNTER — LAB VISIT (OUTPATIENT)
Dept: LAB | Facility: HOSPITAL | Age: 65
End: 2025-07-29
Attending: NURSE PRACTITIONER
Payer: COMMERCIAL

## 2025-07-29 DIAGNOSIS — Z00.00 ROUTINE GENERAL MEDICAL EXAMINATION AT A HEALTH CARE FACILITY: ICD-10-CM

## 2025-07-29 LAB
25(OH)D3+25(OH)D2 SERPL-MCNC: 20 NG/ML (ref 30–96)
ALBUMIN SERPL BCP-MCNC: 3.8 G/DL (ref 3.5–5.2)
ALP SERPL-CCNC: 62 UNIT/L (ref 40–150)
ALT SERPL W/O P-5'-P-CCNC: 19 UNIT/L (ref 0–55)
ANION GAP (OHS): 9 MMOL/L (ref 8–16)
AST SERPL-CCNC: 23 UNIT/L (ref 0–50)
BILIRUB SERPL-MCNC: 0.4 MG/DL (ref 0.1–1)
BUN SERPL-MCNC: 16 MG/DL (ref 8–23)
CALCIUM SERPL-MCNC: 8.9 MG/DL (ref 8.7–10.5)
CHLORIDE SERPL-SCNC: 108 MMOL/L (ref 95–110)
CHOLEST SERPL-MCNC: 204 MG/DL (ref 120–199)
CHOLEST/HDLC SERPL: 4.3 {RATIO} (ref 2–5)
CO2 SERPL-SCNC: 27 MMOL/L (ref 23–29)
CREAT SERPL-MCNC: 0.6 MG/DL (ref 0.5–1.4)
EAG (OHS): 126 MG/DL (ref 68–131)
ERYTHROCYTE [DISTWIDTH] IN BLOOD BY AUTOMATED COUNT: 14.2 % (ref 11.5–14.5)
FOLATE SERPL-MCNC: 6.4 NG/ML (ref 4–24)
GFR SERPLBLD CREATININE-BSD FMLA CKD-EPI: >60 ML/MIN/1.73/M2
GLUCOSE SERPL-MCNC: 94 MG/DL (ref 70–110)
HBA1C MFR BLD: 6 % (ref 4–5.6)
HCT VFR BLD AUTO: 40.9 % (ref 37–48.5)
HDLC SERPL-MCNC: 47 MG/DL (ref 40–75)
HDLC SERPL: 23 % (ref 20–50)
HGB BLD-MCNC: 12.8 GM/DL (ref 12–16)
IRON SERPL-MCNC: 58 UG/DL (ref 30–160)
LDLC SERPL CALC-MCNC: 141.6 MG/DL (ref 63–159)
MCH RBC QN AUTO: 29.6 PG (ref 27–31)
MCHC RBC AUTO-ENTMCNC: 31.3 G/DL (ref 32–36)
MCV RBC AUTO: 95 FL (ref 82–98)
NONHDLC SERPL-MCNC: 157 MG/DL
PLATELET # BLD AUTO: 273 K/UL (ref 150–450)
PMV BLD AUTO: 10.4 FL (ref 9.2–12.9)
POTASSIUM SERPL-SCNC: 4 MMOL/L (ref 3.5–5.1)
PROT SERPL-MCNC: 6.5 GM/DL (ref 6–8.4)
RBC # BLD AUTO: 4.33 M/UL (ref 4–5.4)
SODIUM SERPL-SCNC: 144 MMOL/L (ref 136–145)
TRIGL SERPL-MCNC: 77 MG/DL (ref 30–150)
TSH SERPL-ACNC: 1.62 UIU/ML (ref 0.4–4)
VIT B12 SERPL-MCNC: 241 PG/ML (ref 210–950)
WBC # BLD AUTO: 6.44 K/UL (ref 3.9–12.7)

## 2025-07-29 PROCEDURE — 82607 VITAMIN B-12: CPT

## 2025-07-29 PROCEDURE — 83036 HEMOGLOBIN GLYCOSYLATED A1C: CPT

## 2025-07-29 PROCEDURE — 82746 ASSAY OF FOLIC ACID SERUM: CPT

## 2025-07-29 PROCEDURE — 80061 LIPID PANEL: CPT

## 2025-07-29 PROCEDURE — 36415 COLL VENOUS BLD VENIPUNCTURE: CPT | Mod: PN

## 2025-07-29 PROCEDURE — 80053 COMPREHEN METABOLIC PANEL: CPT

## 2025-07-29 PROCEDURE — 82306 VITAMIN D 25 HYDROXY: CPT

## 2025-07-29 PROCEDURE — 85027 COMPLETE CBC AUTOMATED: CPT

## 2025-07-29 PROCEDURE — 84443 ASSAY THYROID STIM HORMONE: CPT

## 2025-07-29 PROCEDURE — 83540 ASSAY OF IRON: CPT

## 2025-08-01 ENCOUNTER — HOSPITAL ENCOUNTER (OUTPATIENT)
Dept: RADIOLOGY | Facility: HOSPITAL | Age: 65
Discharge: HOME | End: 2025-08-01
Attending: FAMILY MEDICINE
Payer: COMMERCIAL

## 2025-08-01 DIAGNOSIS — Z12.31 ENCOUNTER FOR SCREENING MAMMOGRAM FOR HIGH-RISK PATIENT: ICD-10-CM

## 2025-08-01 PROCEDURE — 77067 SCR MAMMO BI INCL CAD: CPT | Mod: 26,,, | Performed by: RADIOLOGY

## 2025-08-01 PROCEDURE — 77067 SCR MAMMO BI INCL CAD: CPT | Mod: TC,PO

## 2025-08-01 PROCEDURE — 77063 BREAST TOMOSYNTHESIS BI: CPT | Mod: 26,,, | Performed by: RADIOLOGY

## 2025-08-01 RX ORDER — ERGOCALCIFEROL 1.25 MG/1
CAPSULE ORAL
Qty: 12 CAPSULE | Refills: 3 | Status: SHIPPED | OUTPATIENT
Start: 2025-08-01

## 2025-08-01 NOTE — TELEPHONE ENCOUNTER
No care due was identified.  [facility] Embedded Care Due Messages. Reference number: [id number].   8/01/2025 9:21:22 AM CDT

## 2025-08-01 NOTE — TELEPHONE ENCOUNTER
Refill Routing Note   Medication(s) are not appropriate for processing by [facility] for the following reason(s):        Outside of protocol  No active prescription written by provider    [facility] action(s):  Route               Appointments  past 12m or future 3m with PCP    Date Provider   Last Visit   7/28/2025 Shaina Rasmussen MD   Next Visit   10/29/2025 Shaina Rasmussen MD   ED visits in past 90 days: 0        Note composed:12:15 PM 08/01/2025

## 2025-08-13 ENCOUNTER — TELEPHONE (OUTPATIENT)
Dept: GASTROENTEROLOGY | Facility: CLINIC | Age: 65
End: 2025-08-13
Payer: COMMERCIAL

## 2025-08-26 ENCOUNTER — OFFICE VISIT (OUTPATIENT)
Dept: GASTROENTEROLOGY | Facility: CLINIC | Age: 65
End: 2025-08-26
Payer: COMMERCIAL

## 2025-08-26 VITALS — HEIGHT: 66 IN | BODY MASS INDEX: 25.22 KG/M2 | RESPIRATION RATE: 17 BRPM | WEIGHT: 156.94 LBS

## 2025-08-26 DIAGNOSIS — K51.011 ULCERATIVE PANCOLITIS WITH RECTAL BLEEDING: Primary | ICD-10-CM

## 2025-08-26 DIAGNOSIS — K21.9 GASTROESOPHAGEAL REFLUX DISEASE WITHOUT ESOPHAGITIS: ICD-10-CM

## 2025-08-26 PROCEDURE — 3008F BODY MASS INDEX DOCD: CPT | Mod: CPTII,S$GLB,, | Performed by: NURSE PRACTITIONER

## 2025-08-26 PROCEDURE — 99999 PR PBB SHADOW E&M-EST. PATIENT-LVL IV: CPT | Mod: PBBFAC,,, | Performed by: NURSE PRACTITIONER

## 2025-08-26 PROCEDURE — 99204 OFFICE O/P NEW MOD 45 MIN: CPT | Mod: S$GLB,,, | Performed by: NURSE PRACTITIONER

## 2025-08-26 PROCEDURE — 3044F HG A1C LEVEL LT 7.0%: CPT | Mod: CPTII,S$GLB,, | Performed by: NURSE PRACTITIONER

## 2025-08-26 PROCEDURE — 1160F RVW MEDS BY RX/DR IN RCRD: CPT | Mod: CPTII,S$GLB,, | Performed by: NURSE PRACTITIONER

## 2025-08-26 PROCEDURE — 1159F MED LIST DOCD IN RCRD: CPT | Mod: CPTII,S$GLB,, | Performed by: NURSE PRACTITIONER

## 2025-08-27 ENCOUNTER — LAB VISIT (OUTPATIENT)
Dept: LAB | Facility: HOSPITAL | Age: 65
End: 2025-08-27
Attending: NURSE PRACTITIONER
Payer: COMMERCIAL

## 2025-08-27 DIAGNOSIS — K51.011 ULCERATIVE PANCOLITIS WITH RECTAL BLEEDING: ICD-10-CM

## 2025-08-27 PROCEDURE — 87177 OVA AND PARASITES SMEARS: CPT

## 2025-08-27 PROCEDURE — 87449 NOS EACH ORGANISM AG IA: CPT

## 2025-08-27 PROCEDURE — 87046 STOOL CULTR AEROBIC BACT EA: CPT | Mod: 59

## 2025-08-27 PROCEDURE — 87427 SHIGA-LIKE TOXIN AG IA: CPT

## 2025-08-27 PROCEDURE — 87328 CRYPTOSPORIDIUM AG IA: CPT

## 2025-08-27 PROCEDURE — 87046 STOOL CULTR AEROBIC BACT EA: CPT

## 2025-08-27 PROCEDURE — 83993 ASSAY FOR CALPROTECTIN FECAL: CPT

## 2025-08-28 LAB
C COLI+JEJ+UPSA DNA STL QL NAA+NON-PROBE: NEGATIVE
C DIFF GDH STL QL: NEGATIVE
C DIFF TOX A+B STL QL IA: NEGATIVE
CALPROTECTIN INTERP (OHS): ABNORMAL
CALPROTECTIN STOOL (OHS): 66.3 ΜG/G
CRYPTOSP AG SPEC QL: NEGATIVE
E COLI SXT1 STL QL IA: NEGATIVE
E COLI SXT2 STL QL IA: NEGATIVE
G LAMBLIA AG STL QL IA: NEGATIVE

## 2025-08-30 LAB — BACTERIA STL CULT: NORMAL

## 2025-09-03 LAB — O+P STL MICRO: NORMAL
